# Patient Record
Sex: MALE | Race: WHITE | NOT HISPANIC OR LATINO | URBAN - METROPOLITAN AREA
[De-identification: names, ages, dates, MRNs, and addresses within clinical notes are randomized per-mention and may not be internally consistent; named-entity substitution may affect disease eponyms.]

---

## 2022-06-16 ENCOUNTER — INPATIENT (INPATIENT)
Facility: HOSPITAL | Age: 87
LOS: 0 days | Discharge: ANOTHER HOSPITAL | DRG: 536 | End: 2022-06-17
Attending: HOSPITALIST | Admitting: STUDENT IN AN ORGANIZED HEALTH CARE EDUCATION/TRAINING PROGRAM
Payer: MEDICARE

## 2022-06-16 VITALS
RESPIRATION RATE: 16 BRPM | TEMPERATURE: 97 F | OXYGEN SATURATION: 95 % | SYSTOLIC BLOOD PRESSURE: 124 MMHG | HEART RATE: 73 BPM | DIASTOLIC BLOOD PRESSURE: 65 MMHG

## 2022-06-16 LAB
APTT BLD: 26.1 SEC — LOW (ref 27.5–35.5)
HCT VFR BLD CALC: 33.7 % — LOW (ref 39–50)
HGB BLD-MCNC: 11 G/DL — LOW (ref 13–17)
INR BLD: 1.13 RATIO — SIGNIFICANT CHANGE UP (ref 0.88–1.16)
MCHC RBC-ENTMCNC: 32.6 GM/DL — SIGNIFICANT CHANGE UP (ref 32–36)
MCHC RBC-ENTMCNC: 34 PG — SIGNIFICANT CHANGE UP (ref 27–34)
MCV RBC AUTO: 104 FL — HIGH (ref 80–100)
PLATELET # BLD AUTO: 163 K/UL — SIGNIFICANT CHANGE UP (ref 150–400)
PROTHROM AB SERPL-ACNC: 13.2 SEC — SIGNIFICANT CHANGE UP (ref 10.5–13.4)
RBC # BLD: 3.24 M/UL — LOW (ref 4.2–5.8)
RBC # FLD: 13.7 % — SIGNIFICANT CHANGE UP (ref 10.3–14.5)
WBC # BLD: 16.17 K/UL — HIGH (ref 3.8–10.5)
WBC # FLD AUTO: 16.17 K/UL — HIGH (ref 3.8–10.5)

## 2022-06-16 PROCEDURE — 93010 ELECTROCARDIOGRAM REPORT: CPT

## 2022-06-16 PROCEDURE — 99285 EMERGENCY DEPT VISIT HI MDM: CPT

## 2022-06-16 NOTE — ED PROVIDER NOTE - OBJECTIVE STATEMENT
89 yo male BIBEMS s/p episode of syncope at home, witnessed, no head injury c/o left hip pain after the fall.  Denies any chest pain, sob.

## 2022-06-16 NOTE — ED PROVIDER NOTE - PHYSICAL EXAMINATION
Gen: Alert, NAD  Head/eyes: NC/AT, PERRL  ENT: airway patent  Neck: supple  Pulm/lung: Bilateral clear BS  CV/heart: RRR  GI/Abd: soft, NT/ND, +BS, no guarding/rebound tenderness  Musculoskeletal: no edema/erythema/cyanosis, +ttp left lateral hip, unable to range secondary to pain  Skin: no rash  Neuro: AAOx3, grossly intact

## 2022-06-16 NOTE — ED PROVIDER NOTE - NS ED ROS FT
Constitutional: - Fever, - Chills, - Anorexia, - Fatigue, - Night sweats  Eyes: - Discharge, - Irritation, - Redness, - Visual changes, - Light sensitivity, - Pain  EARS: - Ear Pain, - Tinnitus, - Decreased hearing  NOSE: - Congestion, - Epistaxis  MOUTH/THROAT: - Vocal Changes, - Drooling, - Sore throat  NECK: - Lumps, - Stiffness, - Pain  CV: - Palpitations, - Chest Pain, - Edema, + Syncope  RESP:  - Cough, - Shortness of Breath, - Dyspnea on Exertion, - Trouble speaking, - Pleuritic pain - Wheezing  GI: - Diarrhea, - Constipation, - Bloody stools, - Nausea, - Vomiting, - Abdominal Pain  : - Dysuria, -Frequency, - Hematuria, - Hesitancy, - Incontinence, - Saddle Anesthesia, - Abnormal discharge  MSK: - Myalgias, - Arthralgias, - Weakness, - Deformities, +left hip pain  SKIN: - Color change, - Rash, - Swelling, - Ecchymosis, - Abrasion, - laceration  NEURO: - Change in behavior, - Dec. Alertness, - Headache, - Dizziness, - Change in speech, - Weakness, - Seizure-like activity, - Difficulty ambulating

## 2022-06-16 NOTE — ED ADULT TRIAGE NOTE - CHIEF COMPLAINT QUOTE
per ems from home, with witnessed syncopal episode tonight, no head strike, vomiting with ems. earlier today had mechanical trip and fall, left leg pain per ems from home, with witnessed syncopal episode tonight, no head strike, vomiting with ems, zofran given by ems. earlier today had mechanical trip and fall, left leg pain

## 2022-06-17 ENCOUNTER — INPATIENT (INPATIENT)
Facility: HOSPITAL | Age: 87
LOS: 3 days | Discharge: NURSING, REHAB OR LONG-TERM CARE | DRG: 480 | End: 2022-06-21
Attending: INTERNAL MEDICINE | Admitting: INTERNAL MEDICINE
Payer: MEDICARE

## 2022-06-17 VITALS — RESPIRATION RATE: 16 BRPM | SYSTOLIC BLOOD PRESSURE: 120 MMHG | HEART RATE: 81 BPM | DIASTOLIC BLOOD PRESSURE: 76 MMHG

## 2022-06-17 VITALS
SYSTOLIC BLOOD PRESSURE: 131 MMHG | RESPIRATION RATE: 18 BRPM | DIASTOLIC BLOOD PRESSURE: 69 MMHG | OXYGEN SATURATION: 96 % | HEART RATE: 69 BPM

## 2022-06-17 DIAGNOSIS — S72.002A FRACTURE OF UNSPECIFIED PART OF NECK OF LEFT FEMUR, INITIAL ENCOUNTER FOR CLOSED FRACTURE: ICD-10-CM

## 2022-06-17 DIAGNOSIS — I10 ESSENTIAL (PRIMARY) HYPERTENSION: ICD-10-CM

## 2022-06-17 DIAGNOSIS — Z90.49 ACQUIRED ABSENCE OF OTHER SPECIFIED PARTS OF DIGESTIVE TRACT: Chronic | ICD-10-CM

## 2022-06-17 DIAGNOSIS — D72.829 ELEVATED WHITE BLOOD CELL COUNT, UNSPECIFIED: ICD-10-CM

## 2022-06-17 DIAGNOSIS — S72.142A DISPLACED INTERTROCHANTERIC FRACTURE OF LEFT FEMUR, INITIAL ENCOUNTER FOR CLOSED FRACTURE: ICD-10-CM

## 2022-06-17 DIAGNOSIS — Z95.1 PRESENCE OF AORTOCORONARY BYPASS GRAFT: Chronic | ICD-10-CM

## 2022-06-17 DIAGNOSIS — R55 SYNCOPE AND COLLAPSE: ICD-10-CM

## 2022-06-17 DIAGNOSIS — Z29.9 ENCOUNTER FOR PROPHYLACTIC MEASURES, UNSPECIFIED: ICD-10-CM

## 2022-06-17 DIAGNOSIS — D64.9 ANEMIA, UNSPECIFIED: ICD-10-CM

## 2022-06-17 DIAGNOSIS — N40.0 BENIGN PROSTATIC HYPERPLASIA WITHOUT LOWER URINARY TRACT SYMPTOMS: ICD-10-CM

## 2022-06-17 DIAGNOSIS — I25.10 ATHEROSCLEROTIC HEART DISEASE OF NATIVE CORONARY ARTERY WITHOUT ANGINA PECTORIS: ICD-10-CM

## 2022-06-17 DIAGNOSIS — E78.5 HYPERLIPIDEMIA, UNSPECIFIED: ICD-10-CM

## 2022-06-17 LAB
A1C WITH ESTIMATED AVERAGE GLUCOSE RESULT: 5.7 % — HIGH (ref 4–5.6)
ALBUMIN SERPL ELPH-MCNC: 3.3 G/DL — SIGNIFICANT CHANGE UP (ref 3.3–5)
ALBUMIN SERPL ELPH-MCNC: 3.4 G/DL — SIGNIFICANT CHANGE UP (ref 3.3–5)
ALP SERPL-CCNC: 73 U/L — SIGNIFICANT CHANGE UP (ref 40–120)
ALP SERPL-CCNC: 73 U/L — SIGNIFICANT CHANGE UP (ref 40–120)
ALT FLD-CCNC: 33 U/L — SIGNIFICANT CHANGE UP (ref 12–78)
ALT FLD-CCNC: 34 U/L — SIGNIFICANT CHANGE UP (ref 12–78)
ANION GAP SERPL CALC-SCNC: 5 MMOL/L — SIGNIFICANT CHANGE UP (ref 5–17)
ANION GAP SERPL CALC-SCNC: 9 MMOL/L — SIGNIFICANT CHANGE UP (ref 5–17)
AST SERPL-CCNC: 24 U/L — SIGNIFICANT CHANGE UP (ref 15–37)
AST SERPL-CCNC: 27 U/L — SIGNIFICANT CHANGE UP (ref 15–37)
BASOPHILS # BLD AUTO: 0 K/UL — SIGNIFICANT CHANGE UP (ref 0–0.2)
BASOPHILS # BLD AUTO: 0 K/UL — SIGNIFICANT CHANGE UP (ref 0–0.2)
BASOPHILS # BLD AUTO: 0.02 K/UL — SIGNIFICANT CHANGE UP (ref 0–0.2)
BASOPHILS NFR BLD AUTO: 0 % — SIGNIFICANT CHANGE UP (ref 0–2)
BASOPHILS NFR BLD AUTO: 0 % — SIGNIFICANT CHANGE UP (ref 0–2)
BASOPHILS NFR BLD AUTO: 0.1 % — SIGNIFICANT CHANGE UP (ref 0–2)
BILIRUB SERPL-MCNC: 0.6 MG/DL — SIGNIFICANT CHANGE UP (ref 0.2–1.2)
BILIRUB SERPL-MCNC: 0.8 MG/DL — SIGNIFICANT CHANGE UP (ref 0.2–1.2)
BUN SERPL-MCNC: 15 MG/DL — SIGNIFICANT CHANGE UP (ref 7–23)
BUN SERPL-MCNC: 16 MG/DL — SIGNIFICANT CHANGE UP (ref 7–23)
CALCIUM SERPL-MCNC: 8.5 MG/DL — SIGNIFICANT CHANGE UP (ref 8.5–10.1)
CALCIUM SERPL-MCNC: 8.7 MG/DL — SIGNIFICANT CHANGE UP (ref 8.5–10.1)
CHLORIDE SERPL-SCNC: 108 MMOL/L — SIGNIFICANT CHANGE UP (ref 96–108)
CHLORIDE SERPL-SCNC: 109 MMOL/L — HIGH (ref 96–108)
CHOLEST SERPL-MCNC: 96 MG/DL — SIGNIFICANT CHANGE UP
CK SERPL-CCNC: 120 U/L — SIGNIFICANT CHANGE UP (ref 26–308)
CO2 SERPL-SCNC: 24 MMOL/L — SIGNIFICANT CHANGE UP (ref 22–31)
CO2 SERPL-SCNC: 29 MMOL/L — SIGNIFICANT CHANGE UP (ref 22–31)
CREAT SERPL-MCNC: 0.86 MG/DL — SIGNIFICANT CHANGE UP (ref 0.5–1.3)
CREAT SERPL-MCNC: 0.86 MG/DL — SIGNIFICANT CHANGE UP (ref 0.5–1.3)
EGFR: 83 ML/MIN/1.73M2 — SIGNIFICANT CHANGE UP
EGFR: 83 ML/MIN/1.73M2 — SIGNIFICANT CHANGE UP
ELLIPTOCYTES BLD QL SMEAR: SLIGHT — SIGNIFICANT CHANGE UP
EOSINOPHIL # BLD AUTO: 0 K/UL — SIGNIFICANT CHANGE UP (ref 0–0.5)
EOSINOPHIL NFR BLD AUTO: 0 % — SIGNIFICANT CHANGE UP (ref 0–6)
ESTIMATED AVERAGE GLUCOSE: 117 MG/DL — HIGH (ref 68–114)
FERRITIN SERPL-MCNC: 373 NG/ML — SIGNIFICANT CHANGE UP (ref 30–400)
FOLATE SERPL-MCNC: 9.7 NG/ML — SIGNIFICANT CHANGE UP
GLUCOSE SERPL-MCNC: 142 MG/DL — HIGH (ref 70–99)
GLUCOSE SERPL-MCNC: 143 MG/DL — HIGH (ref 70–99)
HCT VFR BLD CALC: 31.2 % — LOW (ref 39–50)
HCT VFR BLD CALC: 31.7 % — LOW (ref 39–50)
HCT VFR BLD CALC: 34.1 % — LOW (ref 39–50)
HDLC SERPL-MCNC: 46 MG/DL — SIGNIFICANT CHANGE UP
HGB BLD-MCNC: 10.2 G/DL — LOW (ref 13–17)
HGB BLD-MCNC: 10.3 G/DL — LOW (ref 13–17)
HGB BLD-MCNC: 11.1 G/DL — LOW (ref 13–17)
IMM GRANULOCYTES NFR BLD AUTO: 0.4 % — SIGNIFICANT CHANGE UP (ref 0–1.5)
INR BLD: 1.19 RATIO — HIGH (ref 0.88–1.16)
IRON SATN MFR SERPL: 11 % — LOW (ref 16–55)
IRON SATN MFR SERPL: 26 UG/DL — LOW (ref 45–165)
LIDOCAIN IGE QN: 179 U/L — SIGNIFICANT CHANGE UP (ref 73–393)
LIPID PNL WITH DIRECT LDL SERPL: 40 MG/DL — SIGNIFICANT CHANGE UP
LYMPHOCYTES # BLD AUTO: 0.46 K/UL — LOW (ref 1–3.3)
LYMPHOCYTES # BLD AUTO: 0.55 K/UL — LOW (ref 1–3.3)
LYMPHOCYTES # BLD AUTO: 0.81 K/UL — LOW (ref 1–3.3)
LYMPHOCYTES # BLD AUTO: 2.6 % — LOW (ref 13–44)
LYMPHOCYTES # BLD AUTO: 3 % — LOW (ref 13–44)
LYMPHOCYTES # BLD AUTO: 5 % — LOW (ref 13–44)
MANUAL SMEAR VERIFICATION: SIGNIFICANT CHANGE UP
MCHC RBC-ENTMCNC: 32.6 GM/DL — SIGNIFICANT CHANGE UP (ref 32–36)
MCHC RBC-ENTMCNC: 32.7 GM/DL — SIGNIFICANT CHANGE UP (ref 32–36)
MCHC RBC-ENTMCNC: 33.9 PG — SIGNIFICANT CHANGE UP (ref 27–34)
MCHC RBC-ENTMCNC: 33.9 PG — SIGNIFICANT CHANGE UP (ref 27–34)
MCV RBC AUTO: 103.7 FL — HIGH (ref 80–100)
MCV RBC AUTO: 104.3 FL — HIGH (ref 80–100)
MONOCYTES # BLD AUTO: 1.46 K/UL — HIGH (ref 0–0.9)
MONOCYTES # BLD AUTO: 1.7 K/UL — HIGH (ref 0–0.9)
MONOCYTES # BLD AUTO: 1.83 K/UL — HIGH (ref 0–0.9)
MONOCYTES NFR BLD AUTO: 10 % — SIGNIFICANT CHANGE UP (ref 2–14)
MONOCYTES NFR BLD AUTO: 9 % — SIGNIFICANT CHANGE UP (ref 2–14)
MONOCYTES NFR BLD AUTO: 9.5 % — SIGNIFICANT CHANGE UP (ref 2–14)
NEUTROPHILS # BLD AUTO: 13.91 K/UL — HIGH (ref 1.8–7.4)
NEUTROPHILS # BLD AUTO: 15.71 K/UL — HIGH (ref 1.8–7.4)
NEUTROPHILS # BLD AUTO: 15.9 K/UL — HIGH (ref 1.8–7.4)
NEUTROPHILS NFR BLD AUTO: 84 % — HIGH (ref 43–77)
NEUTROPHILS NFR BLD AUTO: 86 % — HIGH (ref 43–77)
NEUTROPHILS NFR BLD AUTO: 87.4 % — HIGH (ref 43–77)
NEUTS BAND # BLD: 3 % — SIGNIFICANT CHANGE UP (ref 0–8)
NON HDL CHOLESTEROL: 50 MG/DL — SIGNIFICANT CHANGE UP
NRBC # BLD: 0 /100 WBCS — SIGNIFICANT CHANGE UP (ref 0–0)
NRBC # BLD: 0 — SIGNIFICANT CHANGE UP
NRBC # BLD: SIGNIFICANT CHANGE UP /100 WBCS (ref 0–0)
NRBC # BLD: SIGNIFICANT CHANGE UP /100 WBCS (ref 0–0)
OB PNL STL: POSITIVE
OVALOCYTES BLD QL SMEAR: SLIGHT — SIGNIFICANT CHANGE UP
PLAT MORPH BLD: NORMAL — SIGNIFICANT CHANGE UP
PLATELET # BLD AUTO: 139 K/UL — LOW (ref 150–400)
PLATELET # BLD AUTO: 141 K/UL — LOW (ref 150–400)
POIKILOCYTOSIS BLD QL AUTO: SLIGHT — SIGNIFICANT CHANGE UP
POTASSIUM SERPL-MCNC: 3.7 MMOL/L — SIGNIFICANT CHANGE UP (ref 3.5–5.3)
POTASSIUM SERPL-MCNC: 4.7 MMOL/L — SIGNIFICANT CHANGE UP (ref 3.5–5.3)
POTASSIUM SERPL-SCNC: 3.7 MMOL/L — SIGNIFICANT CHANGE UP (ref 3.5–5.3)
POTASSIUM SERPL-SCNC: 4.7 MMOL/L — SIGNIFICANT CHANGE UP (ref 3.5–5.3)
PROT SERPL-MCNC: 6.5 G/DL — SIGNIFICANT CHANGE UP (ref 6–8.3)
PROT SERPL-MCNC: 6.6 G/DL — SIGNIFICANT CHANGE UP (ref 6–8.3)
PROTHROM AB SERPL-ACNC: 13.7 SEC — HIGH (ref 10.5–13.4)
RBC # BLD: 3.01 M/UL — LOW (ref 4.2–5.8)
RBC # BLD: 3.27 M/UL — LOW (ref 4.2–5.8)
RBC # FLD: 13.6 % — SIGNIFICANT CHANGE UP (ref 10.3–14.5)
RBC # FLD: 13.7 % — SIGNIFICANT CHANGE UP (ref 10.3–14.5)
RBC BLD AUTO: SIGNIFICANT CHANGE UP
SARS-COV-2 RNA SPEC QL NAA+PROBE: SIGNIFICANT CHANGE UP
SODIUM SERPL-SCNC: 142 MMOL/L — SIGNIFICANT CHANGE UP (ref 135–145)
SODIUM SERPL-SCNC: 142 MMOL/L — SIGNIFICANT CHANGE UP (ref 135–145)
TIBC SERPL-MCNC: 225 UG/DL — SIGNIFICANT CHANGE UP (ref 220–430)
TRANSFERRIN SERPL-MCNC: 192 MG/DL — LOW (ref 200–360)
TRIGL SERPL-MCNC: 49 MG/DL — SIGNIFICANT CHANGE UP
TROPONIN I, HIGH SENSITIVITY RESULT: 11.6 NG/L — SIGNIFICANT CHANGE UP
TSH SERPL-MCNC: 0.97 UIU/ML — SIGNIFICANT CHANGE UP (ref 0.36–3.74)
UIBC SERPL-MCNC: 199 UG/DL — SIGNIFICANT CHANGE UP (ref 110–370)
VIT B12 SERPL-MCNC: 366 PG/ML — SIGNIFICANT CHANGE UP (ref 232–1245)
WBC # BLD: 17.97 K/UL — HIGH (ref 3.8–10.5)
WBC # BLD: 18.28 K/UL — HIGH (ref 3.8–10.5)
WBC # FLD AUTO: 17.97 K/UL — HIGH (ref 3.8–10.5)
WBC # FLD AUTO: 18.28 K/UL — HIGH (ref 3.8–10.5)

## 2022-06-17 PROCEDURE — 73502 X-RAY EXAM HIP UNI 2-3 VIEWS: CPT | Mod: 26,LT

## 2022-06-17 PROCEDURE — 76376 3D RENDER W/INTRP POSTPROCES: CPT | Mod: 26

## 2022-06-17 PROCEDURE — 99223 1ST HOSP IP/OBS HIGH 75: CPT | Mod: GC

## 2022-06-17 PROCEDURE — 71045 X-RAY EXAM CHEST 1 VIEW: CPT | Mod: 26,76

## 2022-06-17 PROCEDURE — 73700 CT LOWER EXTREMITY W/O DYE: CPT | Mod: 26,LT

## 2022-06-17 PROCEDURE — 73552 X-RAY EXAM OF FEMUR 2/>: CPT | Mod: 26,LT

## 2022-06-17 PROCEDURE — 99223 1ST HOSP IP/OBS HIGH 75: CPT

## 2022-06-17 PROCEDURE — 71045 X-RAY EXAM CHEST 1 VIEW: CPT | Mod: 26

## 2022-06-17 RX ORDER — ATORVASTATIN CALCIUM 80 MG/1
20 TABLET, FILM COATED ORAL AT BEDTIME
Refills: 0 | Status: DISCONTINUED | OUTPATIENT
Start: 2022-06-17 | End: 2022-06-21

## 2022-06-17 RX ORDER — SODIUM CHLORIDE 9 MG/ML
1000 INJECTION, SOLUTION INTRAVENOUS
Refills: 0 | Status: DISCONTINUED | OUTPATIENT
Start: 2022-06-17 | End: 2022-06-18

## 2022-06-17 RX ORDER — ONDANSETRON 8 MG/1
4 TABLET, FILM COATED ORAL ONCE
Refills: 0 | Status: COMPLETED | OUTPATIENT
Start: 2022-06-17 | End: 2022-06-17

## 2022-06-17 RX ORDER — LOSARTAN POTASSIUM 100 MG/1
50 TABLET, FILM COATED ORAL DAILY
Refills: 0 | Status: DISCONTINUED | OUTPATIENT
Start: 2022-06-17 | End: 2022-06-17

## 2022-06-17 RX ORDER — ASPIRIN/CALCIUM CARB/MAGNESIUM 324 MG
81 TABLET ORAL DAILY
Refills: 0 | Status: DISCONTINUED | OUTPATIENT
Start: 2022-06-17 | End: 2022-06-17

## 2022-06-17 RX ORDER — ATORVASTATIN CALCIUM 80 MG/1
20 TABLET, FILM COATED ORAL AT BEDTIME
Refills: 0 | Status: DISCONTINUED | OUTPATIENT
Start: 2022-06-17 | End: 2022-06-17

## 2022-06-17 RX ORDER — OXYCODONE HYDROCHLORIDE 5 MG/1
2.5 TABLET ORAL EVERY 4 HOURS
Refills: 0 | Status: DISCONTINUED | OUTPATIENT
Start: 2022-06-17 | End: 2022-06-17

## 2022-06-17 RX ORDER — OXYCODONE HYDROCHLORIDE 5 MG/1
5 TABLET ORAL EVERY 4 HOURS
Refills: 0 | Status: DISCONTINUED | OUTPATIENT
Start: 2022-06-17 | End: 2022-06-17

## 2022-06-17 RX ORDER — SUCRALFATE 1 G
1 TABLET ORAL
Refills: 0 | Status: DISCONTINUED | OUTPATIENT
Start: 2022-06-17 | End: 2022-06-17

## 2022-06-17 RX ORDER — MORPHINE SULFATE 50 MG/1
2 CAPSULE, EXTENDED RELEASE ORAL
Qty: 0 | Refills: 0 | DISCHARGE
Start: 2022-06-17

## 2022-06-17 RX ORDER — LANOLIN ALCOHOL/MO/W.PET/CERES
3 CREAM (GRAM) TOPICAL AT BEDTIME
Refills: 0 | Status: DISCONTINUED | OUTPATIENT
Start: 2022-06-17 | End: 2022-06-17

## 2022-06-17 RX ORDER — ENOXAPARIN SODIUM 100 MG/ML
40 INJECTION SUBCUTANEOUS EVERY 24 HOURS
Refills: 0 | Status: DISCONTINUED | OUTPATIENT
Start: 2022-06-17 | End: 2022-06-17

## 2022-06-17 RX ORDER — MORPHINE SULFATE 50 MG/1
2 CAPSULE, EXTENDED RELEASE ORAL EVERY 6 HOURS
Refills: 0 | Status: DISCONTINUED | OUTPATIENT
Start: 2022-06-17 | End: 2022-06-21

## 2022-06-17 RX ORDER — SENNA PLUS 8.6 MG/1
2 TABLET ORAL AT BEDTIME
Refills: 0 | Status: DISCONTINUED | OUTPATIENT
Start: 2022-06-17 | End: 2022-06-17

## 2022-06-17 RX ORDER — SENNA PLUS 8.6 MG/1
2 TABLET ORAL
Qty: 0 | Refills: 0 | DISCHARGE
Start: 2022-06-17

## 2022-06-17 RX ORDER — LANOLIN ALCOHOL/MO/W.PET/CERES
3 CREAM (GRAM) TOPICAL AT BEDTIME
Refills: 0 | Status: DISCONTINUED | OUTPATIENT
Start: 2022-06-17 | End: 2022-06-21

## 2022-06-17 RX ORDER — SENNA PLUS 8.6 MG/1
2 TABLET ORAL AT BEDTIME
Refills: 0 | Status: DISCONTINUED | OUTPATIENT
Start: 2022-06-17 | End: 2022-06-21

## 2022-06-17 RX ORDER — HEPARIN SODIUM 5000 [USP'U]/ML
5000 INJECTION INTRAVENOUS; SUBCUTANEOUS ONCE
Refills: 0 | Status: COMPLETED | OUTPATIENT
Start: 2022-06-17 | End: 2022-06-17

## 2022-06-17 RX ORDER — ACETAMINOPHEN 500 MG
650 TABLET ORAL EVERY 6 HOURS
Refills: 0 | Status: DISCONTINUED | OUTPATIENT
Start: 2022-06-17 | End: 2022-06-17

## 2022-06-17 RX ORDER — PANTOPRAZOLE SODIUM 20 MG/1
40 TABLET, DELAYED RELEASE ORAL EVERY 12 HOURS
Refills: 0 | Status: DISCONTINUED | OUTPATIENT
Start: 2022-06-17 | End: 2022-06-21

## 2022-06-17 RX ORDER — POLYETHYLENE GLYCOL 3350 17 G/17G
17 POWDER, FOR SOLUTION ORAL DAILY
Refills: 0 | Status: DISCONTINUED | OUTPATIENT
Start: 2022-06-17 | End: 2022-06-17

## 2022-06-17 RX ORDER — ONDANSETRON 8 MG/1
4 TABLET, FILM COATED ORAL EVERY 6 HOURS
Refills: 0 | Status: DISCONTINUED | OUTPATIENT
Start: 2022-06-17 | End: 2022-06-21

## 2022-06-17 RX ORDER — HYDROMORPHONE HYDROCHLORIDE 2 MG/ML
0.5 INJECTION INTRAMUSCULAR; INTRAVENOUS; SUBCUTANEOUS EVERY 4 HOURS
Refills: 0 | Status: DISCONTINUED | OUTPATIENT
Start: 2022-06-17 | End: 2022-06-17

## 2022-06-17 RX ORDER — TAMSULOSIN HYDROCHLORIDE 0.4 MG/1
0.4 CAPSULE ORAL AT BEDTIME
Refills: 0 | Status: DISCONTINUED | OUTPATIENT
Start: 2022-06-17 | End: 2022-06-21

## 2022-06-17 RX ORDER — POLYETHYLENE GLYCOL 3350 17 G/17G
17 POWDER, FOR SOLUTION ORAL DAILY
Refills: 0 | Status: DISCONTINUED | OUTPATIENT
Start: 2022-06-17 | End: 2022-06-21

## 2022-06-17 RX ORDER — MORPHINE SULFATE 50 MG/1
2 CAPSULE, EXTENDED RELEASE ORAL EVERY 4 HOURS
Refills: 0 | Status: DISCONTINUED | OUTPATIENT
Start: 2022-06-17 | End: 2022-06-17

## 2022-06-17 RX ORDER — METOPROLOL TARTRATE 50 MG
25 TABLET ORAL DAILY
Refills: 0 | Status: DISCONTINUED | OUTPATIENT
Start: 2022-06-17 | End: 2022-06-17

## 2022-06-17 RX ORDER — TAMSULOSIN HYDROCHLORIDE 0.4 MG/1
0.4 CAPSULE ORAL AT BEDTIME
Refills: 0 | Status: DISCONTINUED | OUTPATIENT
Start: 2022-06-17 | End: 2022-06-17

## 2022-06-17 RX ORDER — POLYETHYLENE GLYCOL 3350 17 G/17G
17 POWDER, FOR SOLUTION ORAL
Qty: 0 | Refills: 0 | DISCHARGE
Start: 2022-06-17

## 2022-06-17 RX ORDER — MORPHINE SULFATE 50 MG/1
4 CAPSULE, EXTENDED RELEASE ORAL ONCE
Refills: 0 | Status: DISCONTINUED | OUTPATIENT
Start: 2022-06-17 | End: 2022-06-17

## 2022-06-17 RX ORDER — SUCRALFATE 1 G
1 TABLET ORAL EVERY 6 HOURS
Refills: 0 | Status: DISCONTINUED | OUTPATIENT
Start: 2022-06-17 | End: 2022-06-21

## 2022-06-17 RX ORDER — HYDROMORPHONE HYDROCHLORIDE 2 MG/ML
0.5 INJECTION INTRAMUSCULAR; INTRAVENOUS; SUBCUTANEOUS
Qty: 0 | Refills: 0 | DISCHARGE
Start: 2022-06-17

## 2022-06-17 RX ORDER — ACETAMINOPHEN 500 MG
2 TABLET ORAL
Qty: 0 | Refills: 0 | DISCHARGE
Start: 2022-06-17

## 2022-06-17 RX ORDER — SODIUM CHLORIDE 9 MG/ML
1000 INJECTION INTRAMUSCULAR; INTRAVENOUS; SUBCUTANEOUS
Refills: 0 | Status: DISCONTINUED | OUTPATIENT
Start: 2022-06-17 | End: 2022-06-17

## 2022-06-17 RX ORDER — HYDROCHLOROTHIAZIDE 25 MG
12.5 TABLET ORAL
Refills: 0 | Status: DISCONTINUED | OUTPATIENT
Start: 2022-06-17 | End: 2022-06-17

## 2022-06-17 RX ORDER — PANTOPRAZOLE SODIUM 20 MG/1
8 TABLET, DELAYED RELEASE ORAL
Qty: 80 | Refills: 0 | Status: DISCONTINUED | OUTPATIENT
Start: 2022-06-17 | End: 2022-06-17

## 2022-06-17 RX ORDER — HYDROMORPHONE HYDROCHLORIDE 2 MG/ML
0.5 INJECTION INTRAMUSCULAR; INTRAVENOUS; SUBCUTANEOUS EVERY 6 HOURS
Refills: 0 | Status: DISCONTINUED | OUTPATIENT
Start: 2022-06-17 | End: 2022-06-21

## 2022-06-17 RX ORDER — ACETAMINOPHEN 500 MG
650 TABLET ORAL EVERY 6 HOURS
Refills: 0 | Status: DISCONTINUED | OUTPATIENT
Start: 2022-06-17 | End: 2022-06-19

## 2022-06-17 RX ADMIN — MORPHINE SULFATE 2 MILLIGRAM(S): 50 CAPSULE, EXTENDED RELEASE ORAL at 20:20

## 2022-06-17 RX ADMIN — SODIUM CHLORIDE 75 MILLILITER(S): 9 INJECTION INTRAMUSCULAR; INTRAVENOUS; SUBCUTANEOUS at 07:30

## 2022-06-17 RX ADMIN — HYDROMORPHONE HYDROCHLORIDE 0.5 MILLIGRAM(S): 2 INJECTION INTRAMUSCULAR; INTRAVENOUS; SUBCUTANEOUS at 11:46

## 2022-06-17 RX ADMIN — Medication 3 MILLIGRAM(S): at 21:29

## 2022-06-17 RX ADMIN — ATORVASTATIN CALCIUM 20 MILLIGRAM(S): 80 TABLET, FILM COATED ORAL at 21:30

## 2022-06-17 RX ADMIN — SODIUM CHLORIDE 60 MILLILITER(S): 9 INJECTION, SOLUTION INTRAVENOUS at 21:30

## 2022-06-17 RX ADMIN — HEPARIN SODIUM 5000 UNIT(S): 5000 INJECTION INTRAVENOUS; SUBCUTANEOUS at 17:26

## 2022-06-17 RX ADMIN — PANTOPRAZOLE SODIUM 10 MG/HR: 20 TABLET, DELAYED RELEASE ORAL at 15:58

## 2022-06-17 RX ADMIN — Medication 1 GRAM(S): at 23:18

## 2022-06-17 RX ADMIN — MORPHINE SULFATE 4 MILLIGRAM(S): 50 CAPSULE, EXTENDED RELEASE ORAL at 01:03

## 2022-06-17 RX ADMIN — HYDROMORPHONE HYDROCHLORIDE 0.5 MILLIGRAM(S): 2 INJECTION INTRAMUSCULAR; INTRAVENOUS; SUBCUTANEOUS at 06:41

## 2022-06-17 RX ADMIN — ONDANSETRON 4 MILLIGRAM(S): 8 TABLET, FILM COATED ORAL at 00:33

## 2022-06-17 RX ADMIN — Medication 25 MILLIGRAM(S): at 06:07

## 2022-06-17 RX ADMIN — LOSARTAN POTASSIUM 50 MILLIGRAM(S): 100 TABLET, FILM COATED ORAL at 06:07

## 2022-06-17 RX ADMIN — MORPHINE SULFATE 4 MILLIGRAM(S): 50 CAPSULE, EXTENDED RELEASE ORAL at 00:33

## 2022-06-17 RX ADMIN — Medication 1 GRAM(S): at 17:27

## 2022-06-17 RX ADMIN — Medication 81 MILLIGRAM(S): at 06:07

## 2022-06-17 RX ADMIN — PANTOPRAZOLE SODIUM 40 MILLIGRAM(S): 20 TABLET, DELAYED RELEASE ORAL at 17:26

## 2022-06-17 RX ADMIN — MORPHINE SULFATE 2 MILLIGRAM(S): 50 CAPSULE, EXTENDED RELEASE ORAL at 19:56

## 2022-06-17 RX ADMIN — TAMSULOSIN HYDROCHLORIDE 0.4 MILLIGRAM(S): 0.4 CAPSULE ORAL at 21:30

## 2022-06-17 RX ADMIN — ONDANSETRON 4 MILLIGRAM(S): 8 TABLET, FILM COATED ORAL at 07:26

## 2022-06-17 RX ADMIN — SENNA PLUS 2 TABLET(S): 8.6 TABLET ORAL at 21:29

## 2022-06-17 RX ADMIN — HYDROMORPHONE HYDROCHLORIDE 0.5 MILLIGRAM(S): 2 INJECTION INTRAMUSCULAR; INTRAVENOUS; SUBCUTANEOUS at 04:48

## 2022-06-17 RX ADMIN — Medication 12.5 MILLIGRAM(S): at 06:07

## 2022-06-17 NOTE — H&P ADULT - PROBLEM SELECTOR PLAN 2
Patient presents with -- likely 2/2 vasogal syncope vs. bradycardia   - Check orthostatic vitals when able given fracture of L hip  - Check TSH, A1c, lipid profile, monitor electrolytes  - Telemetry monitoring to r/o arrhythmia  - Fall and aspiration precautions  - F/u TTE and carotid dopplers  - PT/ OT evaluation  - Neurology (Dr. Harris) consulted, f/u recs  - Cardiology ([facility]) consulted, f/u recs Patient presents with -- likely 2/2 vasogal syncope vs. bradycardia   - Check orthostatic vitals when able given fracture of L hip  - Check TSH, A1c, lipid profile, monitor electrolytes  - Telemetry monitoring to r/o arrhythmia  - Fall and aspiration precautions  - F/u TTE and carotid dopplers  - F/u CK level   - PT/ OT evaluation  - Neurology (Dr. aHrris) consulted, f/u recs  - Cardiology ([facility]) consulted, f/u recs Patient reportedly passed out in his recliner after the fall while in extreme pain as per wife   - regained consciousness after ~1 minute   - patient has no residual deficits   - f/o orthostatics and TSH  - neurological cause of syncope unclear; will hold off on neurology consult for now   - Cardiology Dr. Petersen consulted, f/u recs Patient reportedly passed out in his recliner after the fall while in extreme pain as per wife   - likely vagal mediated syncope   - regained consciousness after ~1 minute   - patient has no residual deficits   - f/o orthostatics and TSH  - Cardiology Dr. Petersen consulted, f/u recs

## 2022-06-17 NOTE — H&P ADULT - ASSESSMENT
88 year old male with PMHx CAD s/p CABG x4, HTN, HLD, BPH, presented to the ED after fall at home yesterday afternoon admitted for left hip fracture with planned OR with orthopedic surgery.     Fracture of left hip.   Patient presents s/p unwitnessed fall at home  - Xray L femur and hip: suspicious for fracture  - continue pain medication prn   - Fall precautions  - PT eval  - RCRI Class II risk for low-intermediate risk surgery  - Cardio input appreciated. Patient medically optimized at intermediate risk for urgent orthopedic procedure with routine hemodynamic monitoring     Syncope.   Patient reportedly passed out in his recliner after the fall while in extreme pain as per wife   - likely vagal mediated syncope   - regained consciousness after ~1 minute   - patient has no residual deficits   - TSH WNL- Neurology consulted Dr. Nevarez     Leukocytosis.   WBC 16.17 on admission likely reactive  - Afebrile, appears non toxic  - Monitor off antibiotics at this time  - F/u AM CBC.  - Surviallance blood cultures ordered     Anemia.   H/H 11.0/33.7 on admission, baseline hemoglobin unknown  - Currently hemodynamically stable, monitor for signs and symptoms of active bleeding  - Maintain active type and screen  - F/u iron panel: iron, ferritin, TIBC, transferrin  - F/u folate, vitamin B12  - F/u AM CBC.    CAD (coronary artery disease).   s/p CABG x4 2003  - continue home aspirin and statin  - stable, no signs or sx of ACS  - cardiology following    Hypertension.   - Metoprolol POD 1, Losartan POD 2   - Monitor routine hemodynamics.    Hyperlipidemia.    Continue home Atorvastatin 20 mg PO qhs and ASA 81 mg PO qd.    BPH (benign prostatic hyperplasia).   Continue home Tamsulosin 0.4 mg PO qd.    Need for prophylactic measure.   SCD's given possible OR today    IMPROVE VTE Individual Risk Assessment          RISK                                                          Points  [  ] Previous VTE                                                3  [  ] Thrombophilia                                             2  [  ] Lower limb paralysis                                   2        (unable to hold up >15 seconds)    [  ] Current Cancer                                             2         (within 6 months)  [  ] Immobilization > 24 hrs                              1  [  ] ICU/CCU stay > 24 hours                             1  [ x ] Age > 60                                                         1    IMPROVE VTE Score: 1.   88 year old male with PMHx CAD s/p CABG x4, HTN, HLD, BPH, presented to the ED after fall at home yesterday afternoon admitted for left hip fracture with planned OR with orthopedic surgery.     Fracture of left hip.   Patient presents s/p unwitnessed fall at home  - Xray L femur and hip: suspicious for fracture  - continue pain medication prn   - Fall precautions  - PT eval post op  - Cardio input appreciated.   - Patient not currently medically optimized for procedure pending GI workup    Emesis   -Patient was nauseous en route from [facility] and noted to be coughing up coffee ground emesis  -Check STAT H/H and Q6, called and sent message to GI on call  - PPI, Carafate    Syncope.   Patient reportedly passed out in his recliner after the fall while in extreme pain as per wife   - likely vagal mediated syncope   - regained consciousness after ~1 minute   - patient has no residual deficits   - TSH WNL- Neurology consulted Dr. Nevarez     Leukocytosis.   WBC 16.17 on admission likely reactive  - Afebrile, appears non toxic  - Monitor off antibiotics at this time  - F/u AM CBC.  - Surveillance blood cultures ordered     Anemia.   H/H 11.0/33.7 on admission, baseline hemoglobin unknown  - Currently hemodynamically stable, monitor for signs and symptoms of active bleeding  - Maintain active type and screen  - F/u iron panel: iron, ferritin, TIBC, transferrin  - F/u folate, vitamin B12  - F/u AM CBC.    CAD (coronary artery disease).   s/p CABG x4 2003  - Hold Aspirin  - continue and statin  - stable, no signs or sx of ACS  - cardiology following    Hypertension.   - Metoprolol, Losartan   - Monitor routine hemodynamics.    Hyperlipidemia.    Continue home Atorvastatin 20 mg PO qhs and ASA 81 mg PO qd.    BPH (benign prostatic hyperplasia).   Continue home Tamsulosin 0.4 mg PO qd.    Need for prophylactic measure.   SCD's for now    IMPROVE VTE Individual Risk Assessment          RISK                                                          Points  [  ] Previous VTE                                                3  [  ] Thrombophilia                                             2  [  ] Lower limb paralysis                                   2        (unable to hold up >15 seconds)    [  ] Current Cancer                                             2         (within 6 months)  [  ] Immobilization > 24 hrs                              1  [  ] ICU/CCU stay > 24 hours                             1  [ x ] Age > 60                                                         1    IMPROVE VTE Score: 1.

## 2022-06-17 NOTE — CONSULT NOTE ADULT - ASSESSMENT
Pt is seen for Fall  -was mechanical fall.   Didn't hit head.   After the fall pt had Brief LOC  No seizure activity is reported.  Non focal exam except left LE   -Has Left Hip Fx.  No signs of CVA.  Clinically volume depleted.   Likely had Vasovagal syncope.  Fluids.  Pain meds.  Ortho follows  - Plan for left Hip Sx   -Neurologically pt is cleared for Sx.  D/w Pt/wife/son at bedside. Questions answered.  D/w Dr. Geiger.  Would continue to follow.

## 2022-06-17 NOTE — H&P ADULT - NSHPSOCIALHISTORY_GEN_ALL_CORE
Tobacco:   EtOH:   Recreational drug use:  Lives with:  Ambulates:  ADLs:  Occupation:  Vaccinations:  Mammogram/Pap Smear/Colonoscopy: Tobacco: former smoker 1ppd age 16 to mid 30s   EtOH: socially  Recreational drug use: denies  Lives with: wife and daughter  Ambulates: with cane as of recently given right shoulder injury ~2 months ago  ADLs: with assistance   Vaccinations: Moderna with booster

## 2022-06-17 NOTE — CONSULT NOTE ADULT - SUBJECTIVE AND OBJECTIVE BOX
Patient is a 88y old  Male who presents with a chief complaint of ? esophgagitis (17 Jun 2022 16:25)    HPI: 88 year old male with PMHx CAD s/p CABG x4, HTN, HLD, BPH, presents to the ED after fall at home yesterday afternoon. Patient was found on the floor in the kitchen, unwitnessed fall, and had landed on his left side. After the fall family helped him up an moved him to the recliner where he laid down in extreme pain. He then passed out from the pain as per wife for about 1 minute. Patient regained consciousness and was brought to the ED. Patient denies memory of passing out. He also denies fever, chills, cp, sob, abd pain, n/v/d. Patient seen by cardiology and transferred to [facility] for further orthopedic workup and management. Per patient, he was nauseous en route from [facility] to [facility] and was coughing up dark Emesis. He has no hx of GI bleed and denies any hx of same. Patient has been on ASA long term for hx of CAD     (17 Jun 2022 13:30)      DRAFT    PAST MEDICAL & SURGICAL HISTORY:  CAD (coronary artery disease)      HTN (hypertension)      HLD (hyperlipidemia)      S/P CABG x 4      History of cholecystectomy          MEDICATIONS  (STANDING):  atorvastatin 20 milliGRAM(s) Oral at bedtime  heparin   Injectable 5000 Unit(s) SubCutaneous once  lactated ringers. 1000 milliLiter(s) (60 mL/Hr) IV Continuous <Continuous>  pantoprazole    Tablet 40 milliGRAM(s) Oral every 12 hours  senna 2 Tablet(s) Oral at bedtime  sucralfate suspension 1 Gram(s) Oral every 6 hours  tamsulosin 0.4 milliGRAM(s) Oral at bedtime    MEDICATIONS  (PRN):  acetaminophen     Tablet .. 650 milliGRAM(s) Oral every 6 hours PRN Temp greater or equal to 38C (100.4F), Mild Pain (1 - 3)  HYDROmorphone  Injectable 0.5 milliGRAM(s) IV Push every 6 hours PRN Severe Pain (7 - 10)  melatonin 3 milliGRAM(s) Oral at bedtime PRN Insomnia  morphine  - Injectable 2 milliGRAM(s) IV Push every 6 hours PRN Moderate Pain (4 - 6)  ondansetron Injectable 4 milliGRAM(s) IV Push every 6 hours PRN Nausea and/or Vomiting  polyethylene glycol 3350 17 Gram(s) Oral daily PRN Constipation      Allergies    No Known Allergies    Intolerances        SOCIAL HISTORY:    No h/o Smoking.   No h/o alcohol use.  Ex Smoker. Quite in past.  Pt smokes.  Pt does drink socially.  Pt drinks alcohol heavily.    FAMILY HISTORY:  No pertinent family history in first degree relatives        REVIEW OF SYSTEMS:    CONSTITUTIONAL: No fever  EYES: No eye pain,   ENMT:  No sinus or throat pain  NECK: No pain or stiffness  RESPIRATORY: No cough, No hemoptysis; No shortness of breath  CARDIOVASCULAR: No acute chest pain, palpitations,  or leg swelling  GASTROINTESTINAL: No abdominal pain. No nausea, vomiting, or hematemesis;  No melena or hematochezia.  GENITOURINARY: No  hematuria, or incontinence  MUSCULOSKELETAL: No joint swelling; No extremity pain  SKIN: No itching, rashes, or lesions   LYMPH NODES: No enlarged glands  NEUROLOGICAL: No headaches, memory loss,   PSYCHIATRIC: No depression, anxiety, mood swings, or difficulty sleeping  ENDOCRINE: No heat or cold intolerance;   HEME/LYMPH: No easy bruising, or bleeding gums  Allergy/Immunology. No medication allergy. No seasonal allergies.    PHYSICAL EXAM:  Vital Signs Last 24 Hrs  T(F): 97.6 (06-17-22 @ 13:03)  HR: 95 (06-17-22 @ 13:03)  BP: 103/62 (06-17-22 @ 13:03)  RR: 19 (06-17-22 @ 13:03)    GENERAL: NAD, well-groomed, well-developed  HEAD:  Atraumatic, Normocephalic  EYES: EOMI, PERRLA, conjunctiva and sclera clear  NECK: Supple, No JVD, thyroid non-palpable    On Neurological Examination:    Mental Status - Pt is alert, awake, oriented X3. Higher functions are intact. Pt. does have mild poor cognition. Follows commands well and able to answer questions appropriately.    Speech -  Normal. Slurred. Pt has no aphasia.    Cranial Nerves - Pupils 3 mm equal and reactive to light, extraocular eye movements intact. Pt has no visual field deficit.  Pt has no right left facial asymmetry. Tongue - is in midline.    Motor Exam - 4 plus/5 all over, No drift. No shaking or tremors.  Muscle tone - is normal all over. Moves all extremities equally. No asymmetry is seen.      Sensory Exam - Pin prick, temperature, joint position and vibration are intact on either side. Pt withdraws all extremities equally on stimulation. No asymmetry seen. No complaints of tingling, numbness.    Gait - Able to stand and walk unassisted. Pt is able to stand up with holding my hands and is able to walk for few feet around the bed. Not falling to either side.    Deep tendon Reflexes - 2 plus all over.    Coordination - Fine finger movements are normal on both sides. Finger to nose is also normal on both sides.       Romberg - Negative.    Neck Supple -  Yes.    LABS:                        10.2   18.28 )-----------( 141      ( 17 Jun 2022 14:50 )             31.2     06-17    142  |  108  |  16  ----------------------------<  142<H>  4.7   |  29  |  0.86    Ca    8.7      17 Jun 2022 05:34    TPro  6.6  /  Alb  3.4  /  TBili  0.8  /  DBili  x   /  AST  24  /  ALT  34  /  AlkPhos  73  06-17    PT/INR - ( 17 Jun 2022 14:50 )   PT: 13.7 sec;   INR: 1.19 ratio         PTT - ( 16 Jun 2022 23:45 )  PTT:26.1 sec      RADIOLOGY & ADDITIONAL STUDIES:       PCP: Dr. Lisa Santiago  Nephro: Dr. Fink Patient is a 88y old  Male who presents with a chief complaint of ? esophgagitis (17 Jun 2022 16:25)    HPI: 88 year old male with PMHx CAD s/p CABG x4, HTN, HLD, BPH, presents to the ED after fall at home yesterday afternoon. Patient was found on the floor in the kitchen, unwitnessed fall, and had landed on his left side. After the fall family helped him up an moved him to the recliner where he laid down in extreme pain. He then passed out from the pain as per wife for about 1 minute. Patient regained consciousness and was brought to the ED. Patient denies memory of passing out. He also denies fever, chills, cp, sob, abd pain, n/v/d. Patient seen by cardiology and transferred to [facility] for further orthopedic workup and management. Per patient, he was nauseous en route [facility] and was coughing up dark Emesis. He has no hx of GI bleed and denies any hx of same. Patient has been on ASA long term for hx of CAD.  No seizure reported.  Found to have left Hip Fx.  Wife and son are at bedside.     (17 Jun 2022 13:30)    PAST MEDICAL & SURGICAL HISTORY:    CAD (coronary artery disease)    HTN (hypertension)    HLD (hyperlipidemia)    S/P CABG x 4    History of cholecystectomy    MEDICATIONS  (STANDING):    atorvastatin 20 milliGRAM(s) Oral at bedtime  heparin   Injectable 5000 Unit(s) SubCutaneous once  lactated ringers. 1000 milliLiter(s) (60 mL/Hr) IV Continuous <Continuous>  pantoprazole    Tablet 40 milliGRAM(s) Oral every 12 hours  senna 2 Tablet(s) Oral at bedtime  sucralfate suspension 1 Gram(s) Oral every 6 hours  tamsulosin 0.4 milliGRAM(s) Oral at bedtime    MEDICATIONS  (PRN):    acetaminophen     Tablet .. 650 milliGRAM(s) Oral every 6 hours PRN Temp greater or equal to 38C (100.4F), Mild Pain (1 - 3)  HYDROmorphone  Injectable 0.5 milliGRAM(s) IV Push every 6 hours PRN Severe Pain (7 - 10)  melatonin 3 milliGRAM(s) Oral at bedtime PRN Insomnia  morphine  - Injectable 2 milliGRAM(s) IV Push every 6 hours PRN Moderate Pain (4 - 6)  ondansetron Injectable 4 milliGRAM(s) IV Push every 6 hours PRN Nausea and/or Vomiting  polyethylene glycol 3350 17 Gram(s) Oral daily PRN Constipation    Allergies    No Known Allergies    SOCIAL HISTORY:    No h/o Smoking.   No h/o alcohol use.    FAMILY HISTORY:    No pertinent family history in first degree relatives    REVIEW OF SYSTEMS:    CONSTITUTIONAL: No fever  EYES: No eye pain,   ENMT:  No sinus or throat pain  NECK: No pain or stiffness  RESPIRATORY: No cough, No hemoptysis; No shortness of breath  CARDIOVASCULAR: No acute chest pain, palpitations,  or leg swelling  GASTROINTESTINAL: No abdominal pain. No nausea, vomiting, or hematemesis;  GENITOURINARY: No  hematuria, or incontinence  MUSCULOSKELETAL: C/o Left Hip pain  SKIN: No itching, rashes, or lesions   LYMPH NODES: No enlarged glands  NEUROLOGICAL: No headaches, memory loss,   PSYCHIATRIC: No depression, anxiety, mood swings  ENDOCRINE: No heat or cold intolerance;   HEME/LYMPH: No easy bruising, or bleeding gums  Allergy/Immunology. No medication allergy. No seasonal allergies.    PHYSICAL EXAM:  Vital Signs Last 24 Hrs  T(F): 97.6 (06-17-22 @ 13:03)  HR: 95 (06-17-22 @ 13:03)  BP: 103/62 (06-17-22 @ 13:03)  RR: 19 (06-17-22 @ 13:03)    GENERAL: NAD, well-groomed, well-developed  HEAD:  Atraumatic, Normocephalic  EYES: EOMI, PERRLA, conjunctiva and sclera clear  NECK: Supple, No JVD, thyroid non-palpable    On Neurological Examination:    Mental Status - Pt is alert, awake, oriented X3. Higher functions are intact. Follows commands well and able to answer questions appropriately.    Speech -  Normal. Pt has no aphasia.    Cranial Nerves - Pupils 3 mm equal and reactive to light, extraocular eye movements intact. Pt has no visual field deficit.  No facial asymmetry. Tongue - is in midline.    Motor Exam - 4 plus/5 all over, except left LE where he has Left Hip Fx. No shaking or tremors.    Sensory Exam - Pt withdraws all extremities equally on stimulation. No asymmetry seen. No complaints of tingling, numbness.    Gait - Not tested. Has Left Hip Fx.     Deep tendon Reflexes - 2 plus all over.    Coordination - Fine finger movements are normal on both sides. Finger to nose is also normal on both sides.       Neck Supple -  Yes.    LABS:                        10.2   18.28 )-----------( 141      ( 17 Jun 2022 14:50 )             31.2     06-17    142  |  108  |  16  ----------------------------<  142<H>  4.7   |  29  |  0.86    Ca    8.7      17 Jun 2022 05:34    TPro  6.6  /  Alb  3.4  /  TBili  0.8  /  DBili  x   /  AST  24  /  ALT  34  /  AlkPhos  73  06-17    PT/INR - ( 17 Jun 2022 14:50 )   PT: 13.7 sec;   INR: 1.19 ratio      PTT - ( 16 Jun 2022 23:45 )  PTT:26.1 sec    RADIOLOGY & ADDITIONAL STUDIES:    < from: CT Hip No Cont, Left (06.17.22 @ 08:11) >    Comminuted left intertrochanteric fracture.    < end of copied text >

## 2022-06-17 NOTE — CONSULT NOTE ADULT - SUBJECTIVE AND OBJECTIVE BOX
[facility] Cardiology    CHIEF COMPLAINT: Patient is a 88y old  Male who presents with a chief complaint of L hip fracture (17 Jun 2022 09:18)      HPI:  88 year old male with PMHx CAD s/p CABG x4, HTN, HLD, BPH, presents to the ED after fall at home yesterday afternoon. Patient was found on the floor in the kitchen, unwitnessed fall, and had landed on his left side. After the fall family helped him up an moved him to the recliner where he laid down in extreme pain. He then passed out from the pain as per wife for about 1 minute. Patient regained consciousness and was brought to the ED. Patient denies memory of passing out. He also denies fever, chills, cp, sob, abd pain, n/v/d  ED course:  Vitals: T 97.4 HR 73 /65 RR 16 SpO2 95% on RA  Labs: WBC 16.17, H/H 11.0/33.7, .0  CXR: pending official read  Xray L femur and hip: pending official read  Given Morphine 4 mg IVP x1, Zofran 4 mg IVP x1 (17 Jun 2022 03:17)    PAST MEDICAL & SURGICAL HISTORY:  CAD (coronary artery disease)      HTN (hypertension)      HLD (hyperlipidemia)      S/P CABG x 4      History of cholecystectomy        SOCIAL HISTORY: no tobacco  FAMILY HISTORY:  No pertinent family history in first degree relatives     HTN  MEDICATIONS  (STANDING):  aspirin enteric coated 81 milliGRAM(s) Oral daily  atorvastatin 20 milliGRAM(s) Oral at bedtime  hydrochlorothiazide 12.5 milliGRAM(s) Oral <User Schedule>  losartan 50 milliGRAM(s) Oral daily  metoprolol succinate ER 25 milliGRAM(s) Oral daily  senna 2 Tablet(s) Oral at bedtime  sodium chloride 0.9%. 1000 milliLiter(s) (75 mL/Hr) IV Continuous <Continuous>  tamsulosin 0.4 milliGRAM(s) Oral at bedtime    MEDICATIONS  (PRN):  acetaminophen     Tablet .. 650 milliGRAM(s) Oral every 6 hours PRN Mild Pain (1 - 3)  HYDROmorphone  Injectable 0.5 milliGRAM(s) IV Push every 4 hours PRN Severe Pain (7 - 10)  melatonin 3 milliGRAM(s) Oral at bedtime PRN Insomnia  morphine  - Injectable 2 milliGRAM(s) IV Push every 4 hours PRN Moderate Pain (4 - 6)  polyethylene glycol 3350 17 Gram(s) Oral daily PRN Constipation    Allergies    No Known Allergies    Intolerances        REVIEW OF SYSTEMS:  CONSTITUTIONAL: No weakness, no fevers   EYES/ENT: No visual changes  NECK: No pain or stiffness  RESPIRATORY: No shortness of breath  CARDIOVASCULAR: No chest pain or palpitations  GASTROINTESTINAL: No abdominal pain  GENITOURINARY: No hematuria  NEUROLOGICAL: No weakness  SKIN: No rash  All other review of systems is negative unless indicated above    VITAL SIGNS:   Vital Signs Last 24 Hrs  T(C): 36.6 (17 Jun 2022 05:55), Max: 36.6 (17 Jun 2022 05:55)  T(F): 97.8 (17 Jun 2022 05:55), Max: 97.8 (17 Jun 2022 05:55)  HR: 68 (17 Jun 2022 05:55) (68 - 73)  BP: 138/73 (17 Jun 2022 05:55) (124/65 - 138/73)  BP(mean): --  RR: 18 (17 Jun 2022 05:55) (16 - 19)  SpO2: 94% (17 Jun 2022 05:55) (94% - 96%)  I&O's Summary    PHYSICAL EXAM:  Constitutional: NAD  Neurological: Alert and oriented  HEENT: EOMI, no JVD  Cardiovascular: S1 and S2, no murmur  Pulmonary: breath sounds bilaterally  Gastrointestinal: Bowel Sounds present, soft, nontender  Ext: no peripheral edema  Skin: No rashes, No cyanosis.  Psych:  Mood calm    LABS: All Labs Reviewed:                        11.1   17.97 )-----------( 139      ( 17 Jun 2022 05:34 )             34.1     06-17    142  |  108  |  16  ----------------------------<  142<H>  4.7   |  29  |  0.86    Ca    8.7      17 Jun 2022 05:34    TPro  6.6  /  Alb  3.4  /  TBili  0.8  /  DBili  x   /  AST  24  /  ALT  34  /  AlkPhos  73  06-17    PT/INR - ( 16 Jun 2022 23:45 )   PT: 13.2 sec;   INR: 1.13 ratio         PTT - ( 16 Jun 2022 23:45 )  PTT:26.1 sec  CARDIAC MARKERS ( 17 Jun 2022 05:34 )  x     / x     / 120 U/L / x     / x          88 year old male with PMHx CAD s/p CABG x4, HTN, HLD, BPH, presents to the ED after fall at home yesterday afternoon. Patient was found on the floor in the kitchen, unwitnessed fall, and had landed on his left side. After the fall family helped him up an moved him to the recliner where he laid down in extreme pain. He then passed out from the pain as per wife for about 1 minute. Patient regained consciousness and was brought to the ED. Patient denies memory of passing out. He also denies fever, chills, cp, sob, abd pain, n/v/d  ED course:  Vitals: T 97.4 HR 73 /65 RR 16 SpO2 95% on RA  Labs: WBC 16.17, H/H 11.0/33.7, .0  CXR: clear lungs  Xray L femur and hip: fx  Given Morphine 4 mg IVP x1, Zofran 4 mg IVP x1 (17 Jun 2022 03:17)    pt denies CP/SOB/Syncope  He admits to hx of unsteady gait, at least 4 falls over past 1-2 years per pt.  12 lead ekg; NSR old RBBB  trop neg  cardiac catheterization July 20, 2021 demonstrating patent LIMA to the LAD, SVG to RCA, SVG to diagonal and a closed SVG to OM.  EF 45%. No stenting was performed    No cardiac contraindication to O.R.  cont asa/statin/BB/ARB/HCTZ  pt sees me outpt [phone]  will follow here            
Patient is an 88 year old male, s/p mechanical fall at home yesterday, complaining of left hip pain. Patient states he lost his balance in the kitchen falling back onto his left hip. Patient as not able to stand up on his own, was helped to a chair by family. Denies loc relating to the fall but possible syncope after the fall from severe pain. Pain currently is a 5/10 constant, aching, non-radiating pain to the left hip and groin worsened with hip movement and improved with rest and pain medications. Denies neck pain, back pain, headache, chest pain, SOB, numbness, tingling, weakness, fever, chills. Patient stated he recently fell and broke his right shoulder, did not require surgery and healed up well. Admits to using a walker to assist with ambulation when needed.

## 2022-06-17 NOTE — CONSULT NOTE ADULT - SUBJECTIVE AND OBJECTIVE BOX
Chief Complaint:  Patient is a 88y old  Male who presents with a chief complaint of hip fracture called to see pt when he fell and broke his hip now found to have guiaic postive stools s/p fall.  denies any melena no brbpr has been stationary in chair for a month. now he has had a colonosocpy 3 years ago never had an  upper gastrointestinal endoscopy in the past   History of Present Illness:   88 year old male with PMHx CAD s/p CABG x4, HTN, HLD, BPH, presents to the ED after fall at home yesterday afternoon. Patient was found on the floor in the kitchen, unwitnessed fall, and had landed on his left side. After the fall family helped him up an moved him to the recliner where he laid down in extreme pain. He then passed out from the pain as per wife for about 1 minute. Patient regained consciousness and was brought to the ED. Patient denies memory of passing out. He also denies fever, chills, cp, sob, abd pain, n/v/d. Patient seen by cardiology and transferred to [facility] for further orthopedic workup and management. Per patient, he was nauseous en route from [facility] to ED and was coughing up dark Emesis. He has no hx of GI bleed and denies any hx of same. Patient has been on ASA long term for hx of CAD        Review of Systems   Review of Systems: Constitutional: denies fever, chills  HEENT: denies blurry vision  Respiratory: denies SOB, cough, sputum production  Cardiovascular: denies CP, palpitations, edema  Gastrointestinal: Admits nausea, vomiting, no diarrhea, constipation, abdominal pain  Genitourinary: denies dysuria, frequency, urgency, hematuria   Skin/Breast: denies rash, itching  MSK: admits L hip pain  Neurologic: denies headache, weakness, dizziness, numbness/tingling  Psychiatric: denies anxiety, depression    Allergies:  No Known Allergies      Medications:  acetaminophen     Tablet .. 650 milliGRAM(s) Oral every 6 hours PRN  atorvastatin 20 milliGRAM(s) Oral at bedtime  HYDROmorphone  Injectable 0.5 milliGRAM(s) IV Push every 6 hours PRN  melatonin 3 milliGRAM(s) Oral at bedtime PRN  morphine  - Injectable 2 milliGRAM(s) IV Push every 6 hours PRN  ondansetron Injectable 4 milliGRAM(s) IV Push every 6 hours PRN  pantoprazole    Tablet 40 milliGRAM(s) Oral every 12 hours  polyethylene glycol 3350 17 Gram(s) Oral daily PRN  senna 2 Tablet(s) Oral at bedtime  sucralfate suspension 1 Gram(s) Oral every 6 hours  tamsulosin 0.4 milliGRAM(s) Oral at bedtime      PMHX/PSHX:  CAD (coronary artery disease)    HTN (hypertension)    HLD (hyperlipidemia)    S/P CABG x 4    History of cholecystectomy        Family history:  No pertinent family history in first degree relatives        Social History:   no etoh no cigs no ivda no prbc    ROS:     General:  No wt loss, fevers, chills, night sweats, fatigue,   Eyes:  Good vision, no reported pain  ENT:  No sore throat, pain, runny nose, dysphagia  CV:  No pain, palpitations, hypo/hypertension  Resp:  No dyspnea, cough, tachypnea, wheezing  GI:  No pain, No nausea, No vomiting, No diarrhea, No constipation, No weight loss, No fever, No pruritis, No rectal bleeding, No tarry stools, No dysphagia,  :  No pain, bleeding, incontinence, nocturia  Muscle:  No pain, weakness  Neuro:  No weakness, tingling, memory problems  Psych:  No fatigue, insomnia, mood problems, depression  Endocrine:  No polyuria, polydipsia, cold/heat intolerance  Heme:  No petechiae, ecchymosis, easy bruisability  Skin:  No rash, tattoos, scars, edema      PHYSICAL EXAM:   Vital Signs:  Vital Signs Last 24 Hrs  T(C): 36.4 (17 Jun 2022 13:03), Max: 36.8 (17 Jun 2022 09:23)  T(F): 97.6 (17 Jun 2022 13:03), Max: 98.2 (17 Jun 2022 09:23)  HR: 95 (17 Jun 2022 13:03) (68 - 95)  BP: 103/62 (17 Jun 2022 13:03) (103/62 - 138/73)  BP(mean): --  RR: 19 (17 Jun 2022 13:03) (16 - 19)  SpO2: 96% (17 Jun 2022 13:03) (94% - 96%)  Daily     Daily     GENERAL:  Appears stated age, well-groomed, well-nourished, no distress  HEENT:  NC/AT,  conjunctivae clear and pink, no thyromegaly, nodules, adenopathy, no JVD, sclera -anicteric  CHEST:  Full & symmetric excursion, no increased effort, breath sounds clear  HEART:  Regular rhythm, S1, S2, no murmur/rub/S3/S4, no abdominal bruit, no edema  ABDOMEN:  Soft, non-tender, non-distended, normoactive bowel sounds,  no masses ,no hepato-splenomegaly, no signs of chronic liver disease  EXTEREMITIES:  no cyanosis,clubbing or edema  SKIN:  No rash/erythema/ecchymoses/petechiae/wounds/abscess/warm/dry  NEURO:  Alert, oriented, no asterixis, no tremor, no encephalopathy    LABS:                        10.2   18.28 )-----------( 141      ( 17 Jun 2022 14:50 )             31.2     06-17    142  |  108  |  16  ----------------------------<  142<H>  4.7   |  29  |  0.86    Ca    8.7      17 Jun 2022 05:34    TPro  6.6  /  Alb  3.4  /  TBili  0.8  /  DBili  x   /  AST  24  /  ALT  34  /  AlkPhos  73  06-17    LIVER FUNCTIONS - ( 17 Jun 2022 05:34 )  Alb: 3.4 g/dL / Pro: 6.6 g/dL / ALK PHOS: 73 U/L / ALT: 34 U/L / AST: 24 U/L / GGT: x           PT/INR - ( 17 Jun 2022 14:50 )   PT: 13.7 sec;   INR: 1.19 ratio         PTT - ( 16 Jun 2022 23:45 )  PTT:26.1 sec    Amylase Serum--      Lipase [id number]       Ammonia--      Imaging:

## 2022-06-17 NOTE — H&P ADULT - PROBLEM SELECTOR PLAN 4
- H/H 11.0/33.7 on admission, baseline hemoglobin unknown  - Currently hemodynamically stable, monitor for signs and symptoms of active bleeding  - Consider transfusion for hemoglobin <7  - Maintain active type and screen  - F/u iron panel: iron, ferritin, TIBC, transferrin  - F/u folate, vitamin B12  - F/u AM CBC

## 2022-06-17 NOTE — H&P ADULT - PROBLEM SELECTOR PLAN 3
WBC 16.17 on admission likely reactive  - Afebrile, appears non toxic  - Monitor off antibiotics at this time  - F/u AM CBC

## 2022-06-17 NOTE — ED ADULT NURSE NOTE - CHIEF COMPLAINT QUOTE
per ems from home, with witnessed syncopal episode tonight, no head strike, vomiting with ems, zofran given by ems. earlier today had mechanical trip and fall, left leg pain

## 2022-06-17 NOTE — H&P ADULT - HISTORY OF PRESENT ILLNESS
88 year old male with PMHx CAD s/p CABG x4, HTN, HLD, BPH, presents to the ED after fall at home yesterday afternoon. Patient was found on the floor in the kitchen, unwitnessed fall, and had landed on his left side. After the fall family helped him up an moved him to the recliner where he laid down in extreme pain. He then passed out from the pain as per wife for about 1 minute. Patient regained consciousness and was brought to the ED. Patient denies memory of passing out. He also denies fever, chills, cp, sob, abd pain, n/v/d. Patient seen by cardiology and transferred to [facility] for further orthopedic workup and management      88 year old male with PMHx CAD s/p CABG x4, HTN, HLD, BPH, presents to the ED after fall at home yesterday afternoon. Patient was found on the floor in the kitchen, unwitnessed fall, and had landed on his left side. After the fall family helped him up an moved him to the recliner where he laid down in extreme pain. He then passed out from the pain as per wife for about 1 minute. Patient regained consciousness and was brought to the ED. Patient denies memory of passing out. He also denies fever, chills, cp, sob, abd pain, n/v/d. Patient seen by cardiology and transferred to [facility] for further orthopedic workup and management. Per patient, he was nauseous en route from [facility] to ED and was coughing up dark Emesis. He has no hx of GI bleed and denies any hx of same.      88 year old male with PMHx CAD s/p CABG x4, HTN, HLD, BPH, presents to the ED after fall at home yesterday afternoon. Patient was found on the floor in the kitchen, unwitnessed fall, and had landed on his left side. After the fall family helped him up an moved him to the recliner where he laid down in extreme pain. He then passed out from the pain as per wife for about 1 minute. Patient regained consciousness and was brought to the ED. Patient denies memory of passing out. He also denies fever, chills, cp, sob, abd pain, n/v/d. Patient seen by cardiology and transferred to [facility] for further orthopedic workup and management. Per patient, he was nauseous en route from [facility] to ED and was coughing up dark Emesis. He has no hx of GI bleed and denies any hx of same. Patient has been on ASA long term for hx of CAD

## 2022-06-17 NOTE — CONSULT NOTE ADULT - ASSESSMENT
88 year old male with PMHx CAD s/p CABG x4, HTN, HLD, BPH, presents to the ED after fall at home yesterday afternoon. Patient was found on the floor in the kitchen, unwitnessed fall, and had landed on his left side 88 year old male with PMHx CAD s/p CABG x4, HTN, HLD, BPH, presents to the ED after fall at home yesterday afternoon. Patient was found on the floor in the kitchen, unwitnessed fall, and had landed on his left side    cad  cabg  heme occult positive  BPH  HTN  HLD  eval for GI bleed    ortho eval  cardio eval  cardiac catheterization July 20, 2021 demonstrating patent LIMA to the LAD, SVG to RCA, SVG to diagonal and a closed SVG to OM.  EF 45%. No stenting was performed  GI eval - GI bleed eval   cvs rx regimen  serial H and H  monitor VS and HD and Sat  I yuri  dvt p - seq teds  cyn op optimization and management in progress - plan for eventual OR with ORTHO

## 2022-06-17 NOTE — H&P ADULT - PROBLEM SELECTOR PLAN 1
Patient presents s/p syncopal episode  - Admit to F  - Xray L femur and hip: pending official read  - Given Morphine 4 mg IVP x1, Zofran 4 mg IVP x1  - Fall precautions  - Check orthostatic vitals when able given fracture of L hip  - PT eval  - Social work eval  - Ortho consulted, f/u recs Patient presents s/p unwitnessed fall at home  - Admit to F  - Xray L femur and hip: suspicious for fracture; pending official read  - Given Morphine 4 mg IVP x1, Zofran 4 mg IVP x1  - continue pain medication prn   - Fall precautions  - Check orthostatic vitals when able given fracture of L hip s/p fall  - PT eval  - Social work eval  - Ortho consulted, f/u recs Patient presents s/p unwitnessed fall at home  - Admit to [facility] plan for OR with ortho   - Xray L femur and hip: suspicious for fracture; pending official read  - Given Morphine 4 mg IVP x1, Zofran 4 mg IVP x1  - continue pain medication prn   - Fall precautions  - f/u orthostatics when able  - PT eval  - RCRI Class II risk for low-intermediate risk surgery f/u cardiology clearance Patient presents s/p unwitnessed fall at home  - Admit to [facility]   - Xray L femur and hip: suspicious for fracture; pending official read  - Given Morphine 4 mg IVP x1, Zofran 4 mg IVP x1  - continue pain medication prn   - Fall precautions  - PT eval  - RCRI Class II risk for low-intermediate risk surgery f/u cardiology clearance  - npo for likely or in am

## 2022-06-17 NOTE — ED ADULT NURSE NOTE - OBJECTIVE STATEMENT
Brought in ambulance reports patient had syncopal episode tonight. Brought in ambulance reports patient had syncopal episode tonight. As per wife patient fell earlier this afternoon on his left side c/o left hip pain. Denies hitting head. Brought in ambulance reports patient had syncopal episode tonight. As per wife patient fell  on his left side c/o left hip pain. Denies hitting head.

## 2022-06-17 NOTE — CONSULT NOTE ADULT - ASSESSMENT
anemia  fracture  gi bleed  likely esopahgitis  hgb stable    plan  ppi bid and carafcate q 6 hours  gerd precautions  monitor cbc and transfuse as needed  in and out  to follow up with clinical status  may go for planned surgery and to  get a/c with gi prophylaxis    Advanced care planning was discussed with patient and family.  Advanced care planning forms were reviewed and discussed.  Risks, benefits and alternatives of gastroenterologic procedures were discussed in detail and all questions were answered.    30 minutes spent.

## 2022-06-17 NOTE — H&P ADULT - NSHPPHYSICALEXAM_GEN_ALL_CORE
GENERAL: NAD, elderly   EYES: sclera clear, no exudates  ENMT: moist mucous membranes  NECK: supple, soft, no thyromegaly noted  LUNGS: good air entry bilaterally, clear to auscultation, symmetric breath sounds, no wheezing or rhonchi appreciated  HEART: soft S1/S2, regular rate and rhythm, no murmurs noted, trace LE edema   GASTROINTESTINAL: abdomen is soft, nontender, nondistended, normoactive bowel sounds, no palpable masses  INTEGUMENT: good skin turgor, no lesions noted  MUSCULOSKELETAL: left  hip pain rom ltd due to pain, right hip full rom, sensation in tact, left hip tenderness to palpation   NEUROLOGIC: awake, alert, oriented x3  HEME/LYMPH: no obvious ecchymosis or petechiae

## 2022-06-17 NOTE — H&P ADULT - PROBLEM SELECTOR PLAN 9
SCD's given possible OR today with ortho     IMPROVE VTE Individual Risk Assessment          RISK                                                          Points  [  ] Previous VTE                                                3  [  ] Thrombophilia                                             2  [  ] Lower limb paralysis                                   2        (unable to hold up >15 seconds)    [  ] Current Cancer                                             2         (within 6 months)  [  ] Immobilization > 24 hrs                              1  [  ] ICU/CCU stay > 24 hours                             1  [ x ] Age > 60                                                         1    IMPROVE VTE Score: 1

## 2022-06-17 NOTE — CONSULT NOTE ADULT - SUBJECTIVE AND OBJECTIVE BOX
HPI:  87YO M PMH CAD s/p CABG x4, HTN, HLD, BPH, presented to the [facility] ED sp fall at home found to have left hip intertrochanteric fracture. Transferred to [facility] for orthopedic intervention. However pt was nauseous en route from [facility] to [facility]  ED and was coughing up dark  coffee ground emesis.  WBC 18K.  No fever chills abd pain urinary symptoms.     Infectious Disease consult was called to evaluate pt and for antibiotic management.    Past Medical & Surgical Hx:  PAST MEDICAL & SURGICAL HISTORY:  CAD (coronary artery disease)  HTN (hypertension)  HLD (hyperlipidemia)  S/P CABG x 4  History of cholecystectomy      Social History--  EtOH: denies  Tobacco: denies   Drug Use: denies      FAMILY HISTORY:  No pertinent family history in first degree relatives        Allergies  No Known Allergies    Intolerances  none      Home Medications:  acetaminophen 325 mg oral tablet: 2 tab(s) orally every 6 hours, As needed, Mild Pain (1 - 3) (17 Jun 2022 10:05)  Aspir 81 oral delayed release tablet: 1 tab(s) orally once a day (17 Jun 2022 03:51)  atorvastatin 20 mg oral tablet: 1 tab(s) orally once a day (17 Jun 2022 03:51)  hydroCHLOROthiazide 12.5 mg oral tablet: 1 tab(s) orally every other day (17 Jun 2022 03:51)  HYDROmorphone: 0.5 milligram(s) injectable every 4 hours, As Needed for severe pain (17 Jun 2022 10:05)  losartan 50 mg oral tablet: 1 tab(s) orally once a day (17 Jun 2022 03:51)  Metoprolol Succinate ER 25 mg oral tablet, extended release: 1 tab(s) orally once a day (17 Jun 2022 03:51)  morphine: 2 milligram(s) injectable every 4 hours, As Needed moderate pain (17 Jun 2022 10:05)  polyethylene glycol 3350 oral powder for reconstitution: 17 gram(s) orally once a day, As needed, Constipation (17 Jun 2022 10:05)  senna oral tablet: 2 tab(s) orally once a day (at bedtime) (17 Jun 2022 10:05)  tamsulosin 0.4 mg oral capsule: 1 cap(s) orally once a day (17 Jun 2022 03:51)      Current Inpatient Medications :    ANTIBIOTICS:       OTHER RELEVANT MEDICATIONS :  acetaminophen     Tablet .. 650 milliGRAM(s) Oral every 6 hours PRN  atorvastatin 20 milliGRAM(s) Oral at bedtime  HYDROmorphone  Injectable 0.5 milliGRAM(s) IV Push every 6 hours PRN  lactated ringers. 1000 milliLiter(s) IV Continuous <Continuous>  melatonin 3 milliGRAM(s) Oral at bedtime PRN  morphine  - Injectable 2 milliGRAM(s) IV Push every 6 hours PRN  ondansetron Injectable 4 milliGRAM(s) IV Push every 6 hours PRN  pantoprazole    Tablet 40 milliGRAM(s) Oral every 12 hours  polyethylene glycol 3350 17 Gram(s) Oral daily PRN  senna 2 Tablet(s) Oral at bedtime  sucralfate suspension 1 Gram(s) Oral every 6 hours  tamsulosin 0.4 milliGRAM(s) Oral at bedtime      ROS:  CONSTITUTIONAL:  Negative fever or chills  EYES:  Negative  blurry vision or double vision  CARDIOVASCULAR:  Negative for chest pain or palpitations  RESPIRATORY:  Negative for cough, wheezing, or SOB   GASTROINTESTINAL:  Negative for diarrhea, constipation, or abdominal pain +nausea, vomiting,  GENITOURINARY:  Negative frequency, urgency , dysuria or hematuria   NEUROLOGIC:  No headache, confusion, dizziness, lightheadedness  All other systems were reviewed and are negative        I&O's Detail    17 Jun 2022 07:01  -  17 Jun 2022 22:30  --------------------------------------------------------  IN:    Lactated Ringers: 240 mL  Total IN: 240 mL    OUT:  Total OUT: 0 mL    Total NET: 240 mL          Physical Exam:  Vital Signs Last 24 Hrs  T(C): 36.4 (17 Jun 2022 13:03), Max: 36.8 (17 Jun 2022 09:23)  T(F): 97.6 (17 Jun 2022 13:03), Max: 98.2 (17 Jun 2022 09:23)  HR: 95 (17 Jun 2022 13:03) (68 - 95)  BP: 103/62 (17 Jun 2022 13:03) (103/62 - 138/73)  RR: 19 (17 Jun 2022 13:03) (16 - 19)  SpO2: 96% (17 Jun 2022 13:03) (94% - 96%)      General: well developed well nourished, in no acute distress  Neck: supple, trachea midline  Lungs: clear, no wheeze/rhonchi  Cardiovascular: regular rate and rhythm, S1 S2  Abdomen: soft, nontender, ND, bowel sounds normal  Neurological:  alert and oriented x3  Skin: no rash  Extremities: no cyanosis/clubbing/edema    Labs:  Complete Blood Count + Automated Diff (06.17.22 @ 14:50)    WBC Count: 18.28 K/uL    RBC Count: 3.01 M/uL    Hemoglobin: 10.2 g/dL    Hematocrit: 31.2 %    Mean Cell Volume: 103.7 fl    Mean Cell Hemoglobin: 33.9 pg    Mean Cell Hemoglobin Conc: 32.7 gm/dL    Red Cell Distrib Width: 13.6 %    Platelet Count - Automated: 141 K/uL      06-17    142  |  108  |  16  ----------------------------<  142<H>  4.7   |  29  |  0.86    Ca    8.7      17 Jun 2022 05:34    TPro  6.6  /  Alb  3.4  /  TBili  0.8  /  DBili  x   /  AST  24  /  ALT  34  /  AlkPhos  73  06-17        RECENT CULTURES:          RADIOLOGY & ADDITIONAL STUDIES:  ACC: [id number] EXAM:  XR CHEST PORTABLE URGENT 1V                          PROCEDURE DATE:  06/17/2022          INTERPRETATION:  AP semierect chest on June 17, 2022 at 2:46 PM. Patient   has vomiting.    Heart magnified by technique.    Sternotomy again noted.    There is an increasing left base atelectatic findings.    This is compared to earlier in the day.    IMPRESSION: Increasing left base atelectatic findings.    ACC: [id number] EXAM:  XR CHEST AP OR PA 1V                        ACC: [id number] EXAM:  XR FEMUR 2 VIEWS LT                        ACC: [id number] EXAM:  XR HIP WITH PELV 2-3V LT                          PROCEDURE DATE:  06/17/2022          INTERPRETATION:  Left hip with pelvis, left femur, and chest. Patient had   a fall with local trauma.    Left hip with pelvis. 3 views. 4 images.    There is rather mild symmetric hip degeneration.    There is density in the L4 body suggesting vertebroplasty.    There is a comminuted fracture content fracture of the left hip with   increased angulation at the fracture site.    Left femur. 2 views of the lower left femur show no effusion and moderate   knee degeneration.    AP chest on June 17, 2022 at 1:19 AM.    COMPARISON: None available.    Heart magnified by technique. Sternotomy noted.    Lungs clear. No fracture.    IMPRESSION: Comminuted left hip intertrochanteric fracture.    Assessment :   87YO M PMH CAD s/p CABG x4, HTN, HLD, BPH, admitted sp fall with left hip intertrochanteric fracture. Transferred to [facility] for orthopedic intervention. However pt was nauseous en route from [facility] to [facility]  ED and was coughing up dark  coffee ground emesis.  WBC 18K.  Suspect leukocytosis reactive  GIB  No clear clinical evidence for infectious process    Plan :   Defer antibiotics  Trend temp and cbc  Fu cultures  Monitor H/H  Pulm toileting  Asp precautions    D/w Dr Geiger    Continue with present regiment .  Approptiate use of antibiotics and adverse effects reviewed.      I have discussed the above plan of care with patient in detail. I have asked if he has any questions or concerns and appropriately addressed them to the best of my ability .      > 45 minutes spent in direct patient care reviewing  the notes, lab data/ imaging , discussion with multidisciplinary team. All questions were addressed and answered to the best of my capacity .    Thank you for allowing me to participate in the care of your patient .      Stacy Dhaliwal MD  Infectious Disease  [phone]

## 2022-06-17 NOTE — PROGRESS NOTE ADULT - SUBJECTIVE AND OBJECTIVE BOX
Patient was seen and examined at bedside. Denies CP/SOB/Dizziness, N/V/D, HA. Pain is controlled. Patient states he is having some muscle spasms on LLE but has improved with pain medication.     Vital Signs Last 24 Hrs  T(C): 36.6 (17 Jun 2022 05:55), Max: 36.6 (17 Jun 2022 05:55)  T(F): 97.8 (17 Jun 2022 05:55), Max: 97.8 (17 Jun 2022 05:55)  HR: 68 (17 Jun 2022 05:55) (68 - 73)  BP: 138/73 (17 Jun 2022 05:55) (124/65 - 138/73)  BP(mean): --  RR: 18 (17 Jun 2022 05:55) (16 - 19)  SpO2: 94% (17 Jun 2022 05:55) (94% - 96%)    GEN: NAD  LLE: Skin intact, fires EHL/TA/GS, SILT, Extremities are warm, compartments are soft, DP 1+ b/l LE.     Labs:                          11.1   17.97 )-----------( 139      ( 17 Jun 2022 05:34 )             34.1       06-17    142  |  108  |  16  ----------------------------<  142<H>  4.7   |  29  |  0.86    Ca    8.7      17 Jun 2022 05:34    TPro  6.6  /  Alb  3.4  /  TBili  0.8  /  DBili  x   /  AST  24  /  ALT  34  /  AlkPhos  73  06-17      A/P: Patient is a 88y y/o Male with Left IT Fracture    -Appreciate medical management  -Pain control/analgesia  -NWB LLE  -Discussed with Dr. Bonilla, plan to transfer to [place] for operative fixation if medically stable.   -Discussed with patient and patients wife who understand and agree with plan.

## 2022-06-17 NOTE — ED ADULT NURSE NOTE - NSIMPLEMENTINTERV_GEN_ALL_ED
Implemented All Fall with Harm Risk Interventions:  [place] to call system. Call bell, personal items and telephone within reach. Instruct patient to call for assistance. Room bathroom lighting operational. Non-slip footwear when patient is off stretcher. Physically safe environment: no spills, clutter or unnecessary equipment. Stretcher in lowest position, wheels locked, appropriate side rails in place. Provide visual cue, wrist band, yellow gown, etc. Monitor gait and stability. Monitor for mental status changes and reorient to person, place, and time. Review medications for side effects contributing to fall risk. Reinforce activity limits and safety measures with patient and family. Provide visual clues: red socks.

## 2022-06-17 NOTE — H&P ADULT - ATTENDING COMMENTS
88 year old male with PMHx CAD s/p CABG x4, HTN, HLD, BPH, presented to the ED after fall at home yesterday afternoon admitted for left hip fracture with planned OR with orthopedic surgery.    Assessment and plan as above. Edited where appropriate directly into the body of the note.

## 2022-06-17 NOTE — ED ADULT NURSE REASSESSMENT NOTE - NS ED NURSE REASSESS COMMENT FT1
1139:  transfer team arrived to take patient to [address], [address]. 1139:  transfer team arrived to take patient to [place], [facility], [address].   Patient was medicated for pain prior to transfer.  the patient continues to have some swelling noted to the left hip.  His wife was called and told that he will be leaving for [place] shortly (I called her on cell number [phone]).  the patient left the ed in stable condition, no distress and with all belongings.  natalie ivory.

## 2022-06-17 NOTE — H&P ADULT - NSHPREVIEWOFSYSTEMS_GEN_ALL_CORE
Constitutional: denies fever, chills  HEENT: denies blurry vision  Respiratory: denies SOB, cough, sputum production  Cardiovascular: denies CP, palpitations, edema  Gastrointestinal: denies nausea, vomiting, diarrhea, constipation, abdominal pain  Genitourinary: denies dysuria, frequency, urgency, hematuria   Skin/Breast: denies rash, itching  MSK: admits L hip pain  Neurologic: denies headache, weakness, dizziness, numbness/tingling  Psychiatric: denies anxiety, depression  ROS negative except as noted above

## 2022-06-17 NOTE — H&P ADULT - NSHPPHYSICALEXAM_GEN_ALL_CORE
T(C): 36.3 (06-16-22 @ 23:15), Max: 36.3 (06-16-22 @ 23:15)  HR: 73 (06-16-22 @ 23:15) (73 - 73)  BP: 124/65 (06-16-22 @ 23:15) (124/65 - 124/65)  RR: 16 (06-16-22 @ 23:15) (16 - 16)  SpO2: 95% (06-16-22 @ 23:15) (95% - 95%)    GENERAL: patient appears well, no acute distress, appropriate, pleasant  EYES: sclera clear, no exudates  ENMT: oropharynx clear without erythema, no exudates, moist mucous membranes  NECK: supple, soft, no thyromegaly noted  LUNGS: good air entry bilaterally, clear to auscultation, symmetric breath sounds, no wheezing or rhonchi appreciated  HEART: soft S1/S2, regular rate and rhythm, no murmurs noted, no lower extremity edema  GASTROINTESTINAL: abdomen is soft, nontender, nondistended, normoactive bowel sounds, no palpable masses  INTEGUMENT: good skin turgor, no lesions noted  MUSCULOSKELETAL: no clubbing or cyanosis, no obvious deformity  NEUROLOGIC: awake, alert, oriented x3, good muscle tone in 4 extremities, no obvious sensory deficits  PSYCHIATRIC: mood is good, affect is congruent, linear and logical thought process  HEME/LYMPH: no palpable supraclavicular nodules, no obvious ecchymosis or petechiae T(C): 36.3 (06-16-22 @ 23:15), Max: 36.3 (06-16-22 @ 23:15)  HR: 73 (06-16-22 @ 23:15) (73 - 73)  BP: 124/65 (06-16-22 @ 23:15) (124/65 - 124/65)  RR: 16 (06-16-22 @ 23:15) (16 - 16)  SpO2: 95% (06-16-22 @ 23:15) (95% - 95%)    GENERAL: patient appears well, no acute distress, appropriate, pleasant  EYES: sclera clear, no exudates  ENMT: oropharynx clear without erythema, no exudates, moist mucous membranes  NECK: supple, soft, no thyromegaly noted  LUNGS: good air entry bilaterally, clear to auscultation, symmetric breath sounds, no wheezing or rhonchi appreciated  HEART: soft S1/S2, regular rate and rhythm, no murmurs noted, trace LE edema   GASTROINTESTINAL: abdomen is soft, nontender, nondistended, normoactive bowel sounds, no palpable masses  INTEGUMENT: good skin turgor, no lesions noted  MUSCULOSKELETAL: left  hip pain limited rom due to extreme pain, right hip full rom, sensation in tact, left hip tenderness to palpation   NEUROLOGIC: awake, alert, oriented x3  HEME/LYMPH: no obvious ecchymosis or petechiae

## 2022-06-17 NOTE — DISCHARGE NOTE PROVIDER - CARE PROVIDER_API CALL
Arturo Bonilla)  Se Marlow  Physicians  [address] [place] [address]  Phone: [phone]  Fax: [phone]  Follow Up Time: 1-3 days

## 2022-06-17 NOTE — PATIENT PROFILE ADULT - FALL HARM RISK - HARM RISK INTERVENTIONS

## 2022-06-17 NOTE — H&P ADULT - NSHPSOCIALHISTORY_GEN_ALL_CORE
EtOH: social  Recreational drug use: denies  Ambulates: with cane as of recently given right shoulder injury ~2 months ago  ADLs: with assistance   Vaccinations: Moderna with booster

## 2022-06-17 NOTE — H&P ADULT - NSHPREVIEWOFSYSTEMS_GEN_ALL_CORE
Constitutional: denies fever, chills  HEENT: denies blurry vision  Respiratory: denies SOB, cough, sputum production  Cardiovascular: denies CP, palpitations, edema  Gastrointestinal: denies nausea, vomiting, diarrhea, constipation, abdominal pain  Genitourinary: denies dysuria, frequency, urgency, hematuria   Skin/Breast: denies rash, itching  MSK: admits L hip pain  Neurologic: denies headache, weakness, dizziness, numbness/tingling  Psychiatric: denies anxiety, depression Constitutional: denies fever, chills  HEENT: denies blurry vision  Respiratory: denies SOB, cough, sputum production  Cardiovascular: denies CP, palpitations, edema  Gastrointestinal: Admits nausea, vomiting, no diarrhea, constipation, abdominal pain  Genitourinary: denies dysuria, frequency, urgency, hematuria   Skin/Breast: denies rash, itching  MSK: admits L hip pain  Neurologic: denies headache, weakness, dizziness, numbness/tingling  Psychiatric: denies anxiety, depression

## 2022-06-17 NOTE — CONSULT NOTE ADULT - SUBJECTIVE AND OBJECTIVE BOX
Date/Time Patient Seen:  		  Referring MD:   Data Reviewed	       Patient is a 88y old  Male who presents with a chief complaint of     Subjective/HPI  in bed  seen and examined  vs noted  labs reviewed  H and P reviewed  ER provider note reviewed  transfer from [facility]     History and Physical:   Outpatient Providers	Dr. De La Rosa (cardio)  Dr. Yeimi Aguilar (PCP)      Patient Identity   · Birth Sex	Male    History of Present Illness   History of Present Illness:   88 year old male with PMHx CAD s/p CABG x4, HTN, HLD, BPH, presents to the ED after fall at home yesterday afternoon. Patient was found on the floor in the kitchen, unwitnessed fall, and had landed on his left side. After the fall family helped him up an moved him to the recliner where he laid down in extreme pain. He then passed out from the pain as per wife for about 1 minute. Patient regained consciousness and was brought to the ED. Patient denies memory of passing out. He also denies fever, chills, cp, sob, abd pain, n/v/d. Patient seen by cardiology and transferred to [facility] for further orthopedic workup and management. Per patient, he was nauseous en route from [facility] to ED and was coughing up dark Emesis. He has no hx of GI bleed and denies any hx of same. Patient has been on ASA long term for hx of CAD  PAST MEDICAL & SURGICAL HISTORY:  CAD (coronary artery disease)    HTN (hypertension)    HLD (hyperlipidemia)    S/P CABG x 4    History of cholecystectomy          Medication list         MEDICATIONS  (STANDING):  atorvastatin 20 milliGRAM(s) Oral at bedtime  pantoprazole Infusion 8 mG/Hr (10 mL/Hr) IV Continuous <Continuous>  senna 2 Tablet(s) Oral at bedtime  sucralfate 1 Gram(s) Oral two times a day  tamsulosin 0.4 milliGRAM(s) Oral at bedtime    MEDICATIONS  (PRN):  acetaminophen     Tablet .. 650 milliGRAM(s) Oral every 6 hours PRN Temp greater or equal to 38C (100.4F), Mild Pain (1 - 3)  HYDROmorphone  Injectable 0.5 milliGRAM(s) IV Push every 6 hours PRN Severe Pain (7 - 10)  melatonin 3 milliGRAM(s) Oral at bedtime PRN Insomnia  morphine  - Injectable 2 milliGRAM(s) IV Push every 6 hours PRN Moderate Pain (4 - 6)  ondansetron Injectable 4 milliGRAM(s) IV Push every 6 hours PRN Nausea and/or Vomiting  polyethylene glycol 3350 17 Gram(s) Oral daily PRN Constipation         Vitals log        ICU Vital Signs Last 24 Hrs  T(C): 36.4 (17 Jun 2022 13:03), Max: 36.8 (17 Jun 2022 09:23)  T(F): 97.6 (17 Jun 2022 13:03), Max: 98.2 (17 Jun 2022 09:23)  HR: 95 (17 Jun 2022 13:03) (68 - 95)  BP: 103/62 (17 Jun 2022 13:03) (103/62 - 138/73)  BP(mean): --  ABP: --  ABP(mean): --  RR: 19 (17 Jun 2022 13:03) (16 - 19)  SpO2: 96% (17 Jun 2022 13:03) (94% - 96%)       PAST SURGICAL HISTORY:  History of cholecystectomy     S/P CABG x 4.     FAMILY HISTORY:  No pertinent family history in first degree relatives. No pertinent family history of: coronary disease.    Social History   Social History (marital status, living situation, occupation, tobacco use, alcohol and drug use, and sexual history): EtOH: social  Recreational drug use: denies  Ambulates: with cane as of recently given right shoulder injury ~2 months ago  ADLs: with assistance   Vaccinations: Moderna with booster    Tobacco Screening   · Core Measure Site	No    Risk Assessment:   Present on Admission   Deep Venous Thrombosis	no  Pulmonary Embolus	no    Heart Failure   Does this patient have a history of or has been diagnosed with heart failure? no.    Input and Output:  I&O's Detail      Lab Data                        10.2   18.28 )-----------( 141      ( 17 Jun 2022 14:50 )             31.2     06-17    142  |  108  |  16  ----------------------------<  142<H>  4.7   |  29  |  0.86    Ca    8.7      17 Jun 2022 05:34    TPro  6.6  /  Alb  3.4  /  TBili  0.8  /  DBili  x   /  AST  24  /  ALT  34  /  AlkPhos  73  06-17      CARDIAC MARKERS ( 17 Jun 2022 05:34 )  x     / x     / 120 U/L / x     / x            Review of Systems	  pain  fall      Objective     Physical Examination    heart s1s2  lung dec BS  abd soft  head nc  verbal  alert  on RA      Pertinent Lab findings & Imaging      Wright:  NO   Adequate UO     I&O's Detail           Discussed with:     Cultures:	        Radiology      ACC: [id number] EXAM:  XR CHEST PORTABLE URGENT 1V                          PROCEDURE DATE:  06/17/2022          INTERPRETATION:  AP semierect chest on June 17, 2022 at 2:46 PM. Patient   has vomiting.    Heart magnified by technique.    Sternotomy again noted.    There is an increasing left base atelectatic findings.    This is compared to earlier in the day.    IMPRESSION: Increasing left base atelectatic findings.    --- End of Report ---            ROBB NORRIS MD; Attending Radiologist  This document has been electronically signed. Jun 17 2022  3:01PM

## 2022-06-17 NOTE — H&P ADULT - PROBLEM SELECTOR PLAN 5
Chronic, stable on admission  - Continue home medications HCTZ 12.5 mg PO qd, Losartan 50 mg PO qd and Metoprolol Succinate ER 25 mg PO qd with hold parameters  - Monitor routine hemodynamics Chronic, stable on admission  - Continue home medications HCTZ 12.5 mg PO qod, Losartan 50 mg PO qd and Metoprolol Succinate ER 25 mg PO qd with hold parameters  - Monitor routine hemodynamics s/p CABG x4 2003  - continue home aspirin and statin  - stable, no signs or sx of ACS  - cardiology Dr. Petersen consulted f/u recs

## 2022-06-17 NOTE — CONSULT NOTE ADULT - MUSCULOSKELETAL
no calf tenderness/strength 5/5 bilateral upper extremities/strength 5/5 bilateral lower extremities details…

## 2022-06-17 NOTE — DISCHARGE NOTE PROVIDER - ATTENDING DISCHARGE PHYSICAL EXAMINATION:
Vitals:  T:  98.2  P:  71  BP:  132/71  RR:  17  SpO2:  95%RA  GENERAL: patient appears well, no acute distress, appropriate, pleasant  EYES: sclera clear, no exudates  ENMT: oropharynx clear without erythema, no exudates, moist mucous membranes  NECK: supple, soft, no thyromegaly noted  LUNGS: good air entry bilaterally, clear to auscultation, symmetric breath sounds, no wheezing or rhonchi appreciated  HEART: soft S1/S2, regular rate and rhythm, no murmurs noted, trace LE edema   GASTROINTESTINAL: abdomen is soft, nontender, nondistended, normoactive bowel sounds, no palpable masses  INTEGUMENT: good skin turgor, no lesions noted  MUSCULOSKELETAL: left  hip pain limited rom due to extreme pain, right hip full rom, sensation in tact, left hip tenderness to palpation   NEUROLOGIC: awake, alert, oriented x3  HEME/LYMPH: no obvious ecchymosis or petechiae

## 2022-06-17 NOTE — CONSULT NOTE ADULT - MUSCULOSKELETAL COMMENTS
left hip no gross deformities, +tenderness to palpation of left hip, no tenderness in remainder of extremity, hip rom limited 2nd to pain, +log roll and heel strike, full painless ROM of remainder of extremity, SILT

## 2022-06-17 NOTE — H&P ADULT - HISTORY OF PRESENT ILLNESS
88 year old male with PMHx HTN, HLD, BPH presents to the ED ---    ED course:  Vitals: T 97.4 HR 73 /65 RR 16 SpO2 95% on RA  Labs: WBC 16.17, H/H 11.0/33.7, .0  CXR: pending official read  Xray L femur and hip: pending official read  Given Morphine 4 mg IVP x1, Zofran 4 mg IVP x1 88 year old male with PMHx CAD s/p CABG x4, HTN, HLD, BPH, presents to the ED after fall at home yesterday afternoon. Patient was found on the floor in the kitchen, unwitnessed fall, and had landed on his left side. After the fall family helped him up an moved him to the recliner where he laid down in extreme pain. He then passed out from the pain as per wife for about 1 minute. Patient regained consciousness and was brought to the ED. Patient denies memory of passing out. He also denies fever, chills, cp, sob, abd pain, n/v/d.     ED course:  Vitals: T 97.4 HR 73 /65 RR 16 SpO2 95% on RA  Labs: WBC 16.17, H/H 11.0/33.7, .0  CXR: pending official read  Xray L femur and hip: pending official read  Given Morphine 4 mg IVP x1, Zofran 4 mg IVP x1 88 year old male with PMHx CAD s/p CABG x4, HTN, HLD, BPH, presents to the ED after fall at home yesterday afternoon. Patient was found on the floor in the kitchen, unwitnessed fall, and had landed on his left side. After the fall family helped him up an moved him to the recliner where he laid down in extreme pain. He then passed out from the pain as per wife for about 1 minute. Patient regained consciousness and was brought to the ED. Patient denies memory of passing out. He also denies fever, chills, cp, sob, abd pain, n/v/d  ED course:  Vitals: T 97.4 HR 73 /65 RR 16 SpO2 95% on RA  Labs: WBC 16.17, H/H 11.0/33.7, .0  CXR: pending official read  Xray L femur and hip: pending official read  Given Morphine 4 mg IVP x1, Zofran 4 mg IVP x1

## 2022-06-17 NOTE — DISCHARGE NOTE PROVIDER - HOSPITAL COURSE
88 year old male with PMHx CAD s/p CABG x4, HTN, HLD, BPH, presents to the ED after fall at home yesterday afternoon. Patient was found on the floor in the kitchen, unwitnessed fall, and had landed on his left side. After the fall family helped him up an moved him to the recliner where he laid down in extreme pain. He then passed out from the pain as per wife for about 1 minute. Patient regained consciousness and was brought to the ED. Patient denies memory of passing out. He also denies fever, chills, cp, sob, abd pain, n/v/d  ED course:  Vitals: T 97.4 HR 73 /65 RR 16 SpO2 95% on RA  Labs: WBC 16.17, H/H 11.0/33.7, .0  CXR: lungs clear  Xray L femur and hip: Comminuted left intertrochanteric fracture  Given Morphine 4 mg IVP x1, Zofran 4 mg IVP x1    Pt. was admitted with orthopedic surgery and cardiology consultation.  Pt. was given analgesic meds as needed with improved pain control.  CT left hip showed again Comminuted left intertrochanteric fracture.  Per orthopedic surgery recommendations patient will be transferred to [facility] for operative fixation.      On day of discharge patient is afebrile and hemodynamically stable.

## 2022-06-17 NOTE — H&P ADULT - PROBLEM SELECTOR PLAN 8
VTE ppx: Lovenox 40 mg subq qd    IMPROVE VTE Individual Risk Assessment          RISK                                                          Points  [  ] Previous VTE                                                3  [  ] Thrombophilia                                             2  [  ] Lower limb paralysis                                   2        (unable to hold up >15 seconds)    [  ] Current Cancer                                             2         (within 6 months)  [  ] Immobilization > 24 hrs                              1  [  ] ICU/CCU stay > 24 hours                             1  [ x ] Age > 60                                                         1    IMPROVE VTE Score: 1 SCD's given possible OR today with ortho     IMPROVE VTE Individual Risk Assessment          RISK                                                          Points  [  ] Previous VTE                                                3  [  ] Thrombophilia                                             2  [  ] Lower limb paralysis                                   2        (unable to hold up >15 seconds)    [  ] Current Cancer                                             2         (within 6 months)  [  ] Immobilization > 24 hrs                              1  [  ] ICU/CCU stay > 24 hours                             1  [ x ] Age > 60                                                         1    IMPROVE VTE Score: 1 Chronic  - Continue home Tamsulosin 0.4 mg PO qd

## 2022-06-17 NOTE — DISCHARGE NOTE PROVIDER - NSDCMRMEDTOKEN_GEN_ALL_CORE_FT
acetaminophen 325 mg oral tablet: 2 tab(s) orally every 6 hours, As needed, Mild Pain (1 - 3)  Aspir 81 oral delayed release tablet: 1 tab(s) orally once a day  atorvastatin 20 mg oral tablet: 1 tab(s) orally once a day  hydroCHLOROthiazide 12.5 mg oral tablet: 1 tab(s) orally every other day  HYDROmorphone: 0.5 milligram(s) injectable every 4 hours, As Needed for severe pain  losartan 50 mg oral tablet: 1 tab(s) orally once a day  Metoprolol Succinate ER 25 mg oral tablet, extended release: 1 tab(s) orally once a day  morphine: 2 milligram(s) injectable every 4 hours, As Needed moderate pain  polyethylene glycol 3350 oral powder for reconstitution: 17 gram(s) orally once a day, As needed, Constipation  senna oral tablet: 2 tab(s) orally once a day (at bedtime)  tamsulosin 0.4 mg oral capsule: 1 cap(s) orally once a day

## 2022-06-17 NOTE — H&P ADULT - PROBLEM SELECTOR PLAN 7
Chronic  - Continue home Tamsulosin 0.4 mg PO qd Chronic  - Continue home Atorvastatin 20 mg PO qhs and ASA 81 mg PO qd

## 2022-06-17 NOTE — DISCHARGE NOTE PROVIDER - NSDCCPCAREPLAN_GEN_ALL_CORE_FT
PRINCIPAL DISCHARGE DIAGNOSIS  Diagnosis: Fracture of left hip  Assessment and Plan of Treatment: You will be transferred to [facility] for operative fixation.  Continue analgesia as needed.      SECONDARY DISCHARGE DIAGNOSES  Diagnosis: Leukocytosis  Assessment and Plan of Treatment: You have been afebrile.  Elevated white count is likely reactive to hip fracture.    Diagnosis: Hypertension  Assessment and Plan of Treatment: Continue your antihypertensive medications    Diagnosis: CAD (coronary artery disease)  Assessment and Plan of Treatment: You were seen by Dr. De La Rosa and cleared for orthopedic surgery.  Continue your cardiac maintenance medications.     PRINCIPAL DISCHARGE DIAGNOSIS  Diagnosis: Fracture of left hip  Assessment and Plan of Treatment: You will be transferred to [facility] for operative fixation.  Continue analgesia as needed.  Follow up with Dr. Bonilla.      SECONDARY DISCHARGE DIAGNOSES  Diagnosis: Leukocytosis  Assessment and Plan of Treatment: You have been afebrile.  Elevated white count is likely reactive to hip fracture.    Diagnosis: Hypertension  Assessment and Plan of Treatment: Continue your antihypertensive medications    Diagnosis: CAD (coronary artery disease)  Assessment and Plan of Treatment: You were seen by Dr. De La Rosa and cleared for orthopedic surgery.  Continue your cardiac maintenance medications.

## 2022-06-17 NOTE — H&P ADULT - PROBLEM SELECTOR PLAN 6
Chronic  - Continue home Atorvastatin 20 mg PO qhs Chronic  - Continue home Atorvastatin 20 mg PO qhs and ASA 81 mg PO qd Chronic, stable on admission  - Continue home medications HCTZ 12.5 mg PO qod, Losartan 50 mg PO qd and Metoprolol Succinate ER 25 mg PO qd with hold parameters  - Monitor routine hemodynamics

## 2022-06-17 NOTE — CONSULT NOTE ADULT - ASSESSMENT
Patient is an 88 year old male with Left Intertrochanteric Hip Fracture    Discussed with Dr. Bonilla  Plan or OR later today or tomorrow morning pending medical clearance  Plan for ORIF Left Hip with IM Nail  Pre-Op Labs  Bed Rest, NWB LLE  Pain Control  Zofran for Nausea  NPO - IV Hydration  CT Left Hip for Pre-Op Planning

## 2022-06-17 NOTE — CHART NOTE - NSCHARTNOTEFT_GEN_A_CORE
GI note reviewed, sx likely due to esophagitis. Patient cleared by Gi for DVT ppx and no planned GI intervention. Started on diet, and will give one dose Heparin prior to midnight. Surgeon Dr. Bonilla notified.

## 2022-06-17 NOTE — H&P ADULT - ASSESSMENT
88 year old male with PMHx CAD s/p CABG x4, HTN, HLD, BPH, presented to the ED after fall at home yesterday afternoon admitted for left hip fracture with planned OR with orthopedic surgery.

## 2022-06-18 LAB
ALBUMIN SERPL ELPH-MCNC: 3.1 G/DL — LOW (ref 3.3–5)
ALP SERPL-CCNC: 53 U/L — SIGNIFICANT CHANGE UP (ref 30–120)
ALT FLD-CCNC: 27 U/L DA — SIGNIFICANT CHANGE UP (ref 10–60)
ANION GAP SERPL CALC-SCNC: 6 MMOL/L — SIGNIFICANT CHANGE UP (ref 5–17)
ANION GAP SERPL CALC-SCNC: 6 MMOL/L — SIGNIFICANT CHANGE UP (ref 5–17)
APPEARANCE UR: CLEAR — SIGNIFICANT CHANGE UP
AST SERPL-CCNC: 16 U/L — SIGNIFICANT CHANGE UP (ref 10–40)
BASOPHILS # BLD AUTO: 0.02 K/UL — SIGNIFICANT CHANGE UP (ref 0–0.2)
BASOPHILS NFR BLD AUTO: 0.1 % — SIGNIFICANT CHANGE UP (ref 0–2)
BILIRUB SERPL-MCNC: 0.9 MG/DL — SIGNIFICANT CHANGE UP (ref 0.2–1.2)
BILIRUB UR-MCNC: NEGATIVE — SIGNIFICANT CHANGE UP
BUN SERPL-MCNC: 20 MG/DL — SIGNIFICANT CHANGE UP (ref 7–23)
BUN SERPL-MCNC: 20 MG/DL — SIGNIFICANT CHANGE UP (ref 7–23)
CALCIUM SERPL-MCNC: 8.6 MG/DL — SIGNIFICANT CHANGE UP (ref 8.4–10.5)
CALCIUM SERPL-MCNC: 8.7 MG/DL — SIGNIFICANT CHANGE UP (ref 8.4–10.5)
CHLORIDE SERPL-SCNC: 104 MMOL/L — SIGNIFICANT CHANGE UP (ref 96–108)
CHLORIDE SERPL-SCNC: 105 MMOL/L — SIGNIFICANT CHANGE UP (ref 96–108)
CO2 SERPL-SCNC: 29 MMOL/L — SIGNIFICANT CHANGE UP (ref 22–31)
CO2 SERPL-SCNC: 30 MMOL/L — SIGNIFICANT CHANGE UP (ref 22–31)
COLOR SPEC: YELLOW — SIGNIFICANT CHANGE UP
CREAT SERPL-MCNC: 0.88 MG/DL — SIGNIFICANT CHANGE UP (ref 0.5–1.3)
CREAT SERPL-MCNC: 1.04 MG/DL — SIGNIFICANT CHANGE UP (ref 0.5–1.3)
DIFF PNL FLD: NEGATIVE — SIGNIFICANT CHANGE UP
EGFR: 69 ML/MIN/1.73M2 — SIGNIFICANT CHANGE UP
EGFR: 83 ML/MIN/1.73M2 — SIGNIFICANT CHANGE UP
EOSINOPHIL # BLD AUTO: 0.02 K/UL — SIGNIFICANT CHANGE UP (ref 0–0.5)
EOSINOPHIL NFR BLD AUTO: 0.1 % — SIGNIFICANT CHANGE UP (ref 0–6)
GLUCOSE SERPL-MCNC: 115 MG/DL — HIGH (ref 70–99)
GLUCOSE SERPL-MCNC: 240 MG/DL — HIGH (ref 70–99)
GLUCOSE UR QL: NEGATIVE MG/DL — SIGNIFICANT CHANGE UP
HCT VFR BLD CALC: 27.1 % — LOW (ref 39–50)
HCT VFR BLD CALC: 29 % — LOW (ref 39–50)
HGB BLD-MCNC: 8.9 G/DL — LOW (ref 13–17)
HGB BLD-MCNC: 9.4 G/DL — LOW (ref 13–17)
IMM GRANULOCYTES NFR BLD AUTO: 0.3 % — SIGNIFICANT CHANGE UP (ref 0–1.5)
KETONES UR-MCNC: NEGATIVE — SIGNIFICANT CHANGE UP
LEUKOCYTE ESTERASE UR-ACNC: NEGATIVE — SIGNIFICANT CHANGE UP
LYMPHOCYTES # BLD AUTO: 1.16 K/UL — SIGNIFICANT CHANGE UP (ref 1–3.3)
LYMPHOCYTES # BLD AUTO: 8.2 % — LOW (ref 13–44)
MCHC RBC-ENTMCNC: 32.4 GM/DL — SIGNIFICANT CHANGE UP (ref 32–36)
MCHC RBC-ENTMCNC: 32.8 GM/DL — SIGNIFICANT CHANGE UP (ref 32–36)
MCHC RBC-ENTMCNC: 34.2 PG — HIGH (ref 27–34)
MCHC RBC-ENTMCNC: 34.5 PG — HIGH (ref 27–34)
MCV RBC AUTO: 105 FL — HIGH (ref 80–100)
MCV RBC AUTO: 105.5 FL — HIGH (ref 80–100)
MONOCYTES # BLD AUTO: 2.04 K/UL — HIGH (ref 0–0.9)
MONOCYTES NFR BLD AUTO: 14.3 % — HIGH (ref 2–14)
NEUTROPHILS # BLD AUTO: 10.94 K/UL — HIGH (ref 1.8–7.4)
NEUTROPHILS NFR BLD AUTO: 77 % — SIGNIFICANT CHANGE UP (ref 43–77)
NITRITE UR-MCNC: NEGATIVE — SIGNIFICANT CHANGE UP
NRBC # BLD: 0 /100 WBCS — SIGNIFICANT CHANGE UP (ref 0–0)
NRBC # BLD: 0 /100 WBCS — SIGNIFICANT CHANGE UP (ref 0–0)
PH UR: 5 — SIGNIFICANT CHANGE UP (ref 5–8)
PLATELET # BLD AUTO: 115 K/UL — LOW (ref 150–400)
PLATELET # BLD AUTO: 123 K/UL — LOW (ref 150–400)
POTASSIUM SERPL-MCNC: 3.9 MMOL/L — SIGNIFICANT CHANGE UP (ref 3.5–5.3)
POTASSIUM SERPL-MCNC: 4.2 MMOL/L — SIGNIFICANT CHANGE UP (ref 3.5–5.3)
POTASSIUM SERPL-SCNC: 3.9 MMOL/L — SIGNIFICANT CHANGE UP (ref 3.5–5.3)
POTASSIUM SERPL-SCNC: 4.2 MMOL/L — SIGNIFICANT CHANGE UP (ref 3.5–5.3)
PROT SERPL-MCNC: 5.6 G/DL — LOW (ref 6–8.3)
PROT UR-MCNC: NEGATIVE MG/DL — SIGNIFICANT CHANGE UP
RBC # BLD: 2.58 M/UL — LOW (ref 4.2–5.8)
RBC # BLD: 2.75 M/UL — LOW (ref 4.2–5.8)
RBC # FLD: 13.7 % — SIGNIFICANT CHANGE UP (ref 10.3–14.5)
RBC # FLD: 13.9 % — SIGNIFICANT CHANGE UP (ref 10.3–14.5)
SODIUM SERPL-SCNC: 139 MMOL/L — SIGNIFICANT CHANGE UP (ref 135–145)
SODIUM SERPL-SCNC: 141 MMOL/L — SIGNIFICANT CHANGE UP (ref 135–145)
SP GR SPEC: 1.02 — SIGNIFICANT CHANGE UP (ref 1.01–1.02)
UROBILINOGEN FLD QL: NEGATIVE MG/DL — SIGNIFICANT CHANGE UP
WBC # BLD: 14.22 K/UL — HIGH (ref 3.8–10.5)
WBC # BLD: 16.62 K/UL — HIGH (ref 3.8–10.5)
WBC # FLD AUTO: 14.22 K/UL — HIGH (ref 3.8–10.5)
WBC # FLD AUTO: 16.62 K/UL — HIGH (ref 3.8–10.5)

## 2022-06-18 PROCEDURE — 99233 SBSQ HOSP IP/OBS HIGH 50: CPT

## 2022-06-18 PROCEDURE — 27245 TREAT THIGH FRACTURE: CPT | Mod: LT

## 2022-06-18 DEVICE — IMPLANTABLE DEVICE: Type: IMPLANTABLE DEVICE | Site: LEFT | Status: FUNCTIONAL

## 2022-06-18 DEVICE — SCREW LAG GAMMA 10.5X105MM: Type: IMPLANTABLE DEVICE | Site: LEFT | Status: FUNCTIONAL

## 2022-06-18 DEVICE — SCREW LOCK FULLY THREADED 5X37.5MM: Type: IMPLANTABLE DEVICE | Site: LEFT | Status: FUNCTIONAL

## 2022-06-18 DEVICE — KWIRE 3.2X450MM: Type: IMPLANTABLE DEVICE | Site: LEFT | Status: FUNCTIONAL

## 2022-06-18 RX ORDER — SODIUM CHLORIDE 9 MG/ML
1000 INJECTION, SOLUTION INTRAVENOUS
Refills: 0 | Status: DISCONTINUED | OUTPATIENT
Start: 2022-06-18 | End: 2022-06-18

## 2022-06-18 RX ORDER — SODIUM CHLORIDE 9 MG/ML
1000 INJECTION, SOLUTION INTRAVENOUS
Refills: 0 | Status: DISCONTINUED | OUTPATIENT
Start: 2022-06-18 | End: 2022-06-21

## 2022-06-18 RX ORDER — CEFAZOLIN SODIUM 1 G
2000 VIAL (EA) INJECTION EVERY 8 HOURS
Refills: 0 | Status: COMPLETED | OUTPATIENT
Start: 2022-06-18 | End: 2022-06-19

## 2022-06-18 RX ORDER — HYDROMORPHONE HYDROCHLORIDE 2 MG/ML
0.5 INJECTION INTRAMUSCULAR; INTRAVENOUS; SUBCUTANEOUS
Refills: 0 | Status: DISCONTINUED | OUTPATIENT
Start: 2022-06-18 | End: 2022-06-18

## 2022-06-18 RX ORDER — ENOXAPARIN SODIUM 100 MG/ML
40 INJECTION SUBCUTANEOUS EVERY 24 HOURS
Refills: 0 | Status: DISCONTINUED | OUTPATIENT
Start: 2022-06-19 | End: 2022-06-21

## 2022-06-18 RX ORDER — MAGNESIUM HYDROXIDE 400 MG/1
30 TABLET, CHEWABLE ORAL DAILY
Refills: 0 | Status: DISCONTINUED | OUTPATIENT
Start: 2022-06-18 | End: 2022-06-21

## 2022-06-18 RX ORDER — ACETAMINOPHEN 500 MG
1000 TABLET ORAL EVERY 6 HOURS
Refills: 0 | Status: DISCONTINUED | OUTPATIENT
Start: 2022-06-18 | End: 2022-06-21

## 2022-06-18 RX ORDER — OXYCODONE HYDROCHLORIDE 5 MG/1
10 TABLET ORAL
Refills: 0 | Status: DISCONTINUED | OUTPATIENT
Start: 2022-06-18 | End: 2022-06-21

## 2022-06-18 RX ORDER — ONDANSETRON 8 MG/1
4 TABLET, FILM COATED ORAL ONCE
Refills: 0 | Status: DISCONTINUED | OUTPATIENT
Start: 2022-06-18 | End: 2022-06-18

## 2022-06-18 RX ORDER — FAMOTIDINE 10 MG/ML
20 INJECTION INTRAVENOUS EVERY 12 HOURS
Refills: 0 | Status: DISCONTINUED | OUTPATIENT
Start: 2022-06-18 | End: 2022-06-18

## 2022-06-18 RX ORDER — OXYCODONE HYDROCHLORIDE 5 MG/1
5 TABLET ORAL
Refills: 0 | Status: DISCONTINUED | OUTPATIENT
Start: 2022-06-18 | End: 2022-06-21

## 2022-06-18 RX ADMIN — SODIUM CHLORIDE 100 MILLILITER(S): 9 INJECTION, SOLUTION INTRAVENOUS at 09:10

## 2022-06-18 RX ADMIN — HYDROMORPHONE HYDROCHLORIDE 0.5 MILLIGRAM(S): 2 INJECTION INTRAMUSCULAR; INTRAVENOUS; SUBCUTANEOUS at 16:31

## 2022-06-18 RX ADMIN — Medication 1000 MILLIGRAM(S): at 23:30

## 2022-06-18 RX ADMIN — Medication 1 GRAM(S): at 23:30

## 2022-06-18 RX ADMIN — TAMSULOSIN HYDROCHLORIDE 0.4 MILLIGRAM(S): 0.4 CAPSULE ORAL at 21:14

## 2022-06-18 RX ADMIN — MORPHINE SULFATE 2 MILLIGRAM(S): 50 CAPSULE, EXTENDED RELEASE ORAL at 02:40

## 2022-06-18 RX ADMIN — Medication 1 GRAM(S): at 05:49

## 2022-06-18 RX ADMIN — MORPHINE SULFATE 2 MILLIGRAM(S): 50 CAPSULE, EXTENDED RELEASE ORAL at 09:13

## 2022-06-18 RX ADMIN — Medication 650 MILLIGRAM(S): at 17:50

## 2022-06-18 RX ADMIN — SODIUM CHLORIDE 75 MILLILITER(S): 9 INJECTION, SOLUTION INTRAVENOUS at 15:27

## 2022-06-18 RX ADMIN — PANTOPRAZOLE SODIUM 40 MILLIGRAM(S): 20 TABLET, DELAYED RELEASE ORAL at 17:50

## 2022-06-18 RX ADMIN — Medication 1 GRAM(S): at 17:51

## 2022-06-18 RX ADMIN — Medication 650 MILLIGRAM(S): at 18:32

## 2022-06-18 RX ADMIN — ATORVASTATIN CALCIUM 20 MILLIGRAM(S): 80 TABLET, FILM COATED ORAL at 21:14

## 2022-06-18 RX ADMIN — MORPHINE SULFATE 2 MILLIGRAM(S): 50 CAPSULE, EXTENDED RELEASE ORAL at 02:23

## 2022-06-18 RX ADMIN — Medication 100 MILLIGRAM(S): at 18:36

## 2022-06-18 RX ADMIN — Medication 3 MILLIGRAM(S): at 21:13

## 2022-06-18 RX ADMIN — MORPHINE SULFATE 2 MILLIGRAM(S): 50 CAPSULE, EXTENDED RELEASE ORAL at 09:30

## 2022-06-18 RX ADMIN — PANTOPRAZOLE SODIUM 40 MILLIGRAM(S): 20 TABLET, DELAYED RELEASE ORAL at 05:48

## 2022-06-18 RX ADMIN — HYDROMORPHONE HYDROCHLORIDE 0.5 MILLIGRAM(S): 2 INJECTION INTRAMUSCULAR; INTRAVENOUS; SUBCUTANEOUS at 16:50

## 2022-06-18 NOTE — DIETITIAN INITIAL EVALUATION ADULT - NSFNSPHYEXAMSKINFT_GEN_A_CORE
Pressure Injury 1: Left:,buttocks, Unstageable  Pressure Injury 2: none, none  Pressure Injury 3: none, none  Pressure Injury 4: none, none  Pressure Injury 5: none, none  Pressure Injury 6: none, none  Pressure Injury 7: none, none  Pressure Injury 8: none, none  Pressure Injury 9: none, none  Pressure Injury 10: none, none  Pressure Injury 11: none, none

## 2022-06-18 NOTE — DIETITIAN INITIAL EVALUATION ADULT - PERTINENT MEDS FT
MEDICATIONS  (STANDING):  atorvastatin 20 milliGRAM(s) Oral at bedtime  lactated ringers. 1000 milliLiter(s) (100 mL/Hr) IV Continuous <Continuous>  pantoprazole    Tablet 40 milliGRAM(s) Oral every 12 hours  senna 2 Tablet(s) Oral at bedtime  sucralfate suspension 1 Gram(s) Oral every 6 hours  tamsulosin 0.4 milliGRAM(s) Oral at bedtime    MEDICATIONS  (PRN):  acetaminophen     Tablet .. 650 milliGRAM(s) Oral every 6 hours PRN Temp greater or equal to 38C (100.4F), Mild Pain (1 - 3)  HYDROmorphone  Injectable 0.5 milliGRAM(s) IV Push every 6 hours PRN Severe Pain (7 - 10)  melatonin 3 milliGRAM(s) Oral at bedtime PRN Insomnia  morphine  - Injectable 2 milliGRAM(s) IV Push every 6 hours PRN Moderate Pain (4 - 6)  ondansetron Injectable 4 milliGRAM(s) IV Push every 6 hours PRN Nausea and/or Vomiting  polyethylene glycol 3350 17 Gram(s) Oral daily PRN Constipation

## 2022-06-18 NOTE — PROGRESS NOTE ADULT - ASSESSMENT
88 year old male with PMHx CAD s/p CABG x4, HTN, HLD, BPH, presents to the ED after fall at home yesterday afternoon. Patient was found on the floor in the kitchen, unwitnessed fall, and had landed on his left side    cad  cabg  heme occult positive  BPH  HTN  HLD  eval for GI bleed    NEURO and GI eval noted  VS noted  serial H and H  on IVF      ortho eval  cardio eval  cardiac catheterization July 20, 2021 demonstrating patent LIMA to the LAD, SVG to RCA, SVG to diagonal and a closed SVG to OM.  EF 45%. No stenting was performed  GI eval - GI bleed eval   cvs rx regimen  serial H and H  monitor VS and HD and Sat  I yuri  dvt p - seq teds  cyn op optimization and management in progress - plan for eventual OR with ORTHO

## 2022-06-18 NOTE — DIETITIAN INITIAL EVALUATION ADULT - PERTINENT LABORATORY DATA
06-18    141  |  105  |  20  ----------------------------<  115<H>  3.9   |  30  |  0.88    Ca    8.6      18 Jun 2022 06:56    TPro  5.6<L>  /  Alb  3.1<L>  /  TBili  0.9  /  DBili  x   /  AST  16  /  ALT  27  /  AlkPhos  53  06-18  A1C with Estimated Average Glucose Result: 5.7 % (06-17-22 @ 08:58)

## 2022-06-18 NOTE — PROGRESS NOTE ADULT - ASSESSMENT
88 year old male with PMHx CAD s/p CABG x4, HTN, HLD, BPH, presented to the ED after fall at home yesterday afternoon admitted for left hip fracture with planned OR with orthopedic surgery.     Fracture of left hip.   - Xray L femur and hip: suspicious for fracture  - continue pain medication prn   - Fall precautions  - PT eval post op  - Cardio input appreciated.   - Patient currently medically cleared for procedure as per GI and cardiology workup    Emesis   -Patient was nauseous en route from [facility] and noted to be coughing up coffee ground emesis  -Check STAT H/H and Q6, called and sent message to GI on call  - PPI, Carafate    Syncope.   Patient reportedly passed out in his recliner after the fall while in extreme pain as per wife   - likely vagal mediated syncope   - regained consciousness after ~1 minute   - patient has no residual deficits   - TSH WNL- Neurology consulted Dr. Nevarez     Leukocytosis.   WBC 16.17 on admission likely reactive  - Afebrile, appears non toxic  - Monitor off antibiotics at this time  - F/u AM CBC.  - Surveillance blood cultures ordered     Anemia.   H/H 11.0/33.7 on admission, baseline hemoglobin unknown  - Currently hemodynamically stable, monitor for signs and symptoms of active bleeding  - Maintain active type and screen  - F/u iron panel: iron, ferritin, TIBC, transferrin  - F/u folate, vitamin B12  - F/u AM CBC.    CAD (coronary artery disease).   s/p CABG x4 2003  - Hold Aspirin  - continue and statin  - stable, no signs or sx of ACS  - cardiology following    Hypertension.   - Metoprolol, Losartan   - Monitor routine hemodynamics.    Hyperlipidemia.    Continue home Atorvastatin 20 mg PO qhs and ASA 81 mg PO qd.    BPH (benign prostatic hyperplasia).   Continue home Tamsulosin 0.4 mg PO qd.    Need for prophylactic measure.   SCD's for now    IMPROVE VTE Individual Risk Assessment          RISK                                                          Points  [  ] Previous VTE                                                3  [  ] Thrombophilia                                             2  [  ] Lower limb paralysis                                   2        (unable to hold up >15 seconds)    [  ] Current Cancer                                             2         (within 6 months)  [  ] Immobilization > 24 hrs                              1  [  ] ICU/CCU stay > 24 hours                             1  [ x ] Age > 60                                                         1    IMPROVE VTE Score: 1.   88 year old male with PMHx CAD s/p CABG x4, HTN, HLD, BPH, presented to the ED after fall at home yesterday afternoon admitted for left hip fracture with planned OR with orthopedic surgery.     Fracture of left hip.   - Xray L femur and hip: suspicious for fracture  - continue pain medication prn   - Fall precautions  - PT eval for post op  - Cardiology consulted for medical clearance for surgery, no absolute contraindications and should proceed.  - Patient currently medically cleared for procedure as per GI and cardiology workup    Emesis   -Patient was noted to be coughing up coffee ground emesis en route from [facility]  -GI consulted, appreciate reccs  -trend CBC  -PPI, Carafate    Syncope.   Patient reportedly passed out in his recliner after the fall while in extreme pain as per wife   - likely vagal mediated syncope   - regained consciousness after ~1 minute   - patient has no residual deficits   - TSH WNL- Neurology consulted Dr. Nevarez   - Echo findings appear unrelated to episode    Leukocytosis.   WBC 16.17 on admission likely reactive, downtrending  - Afebrile, appears non toxic  - Monitor off antibiotics at this time  - Surveillance blood cultures pending    Anemia.   H/H 11.0/33.7 on admission, baseline hemoglobin unknown  - Downtrending Hb but currently hemodynamically stable, monitor for signs and symptoms of active bleeding  - Maintain active type and screen  - F/u iron panel: iron, ferritin, TIBC, transferrin  - Folate, vitamin B12 WNL  - Trend CBC.    CAD (coronary artery disease).   s/p CABG x4 2003  - Hold Aspirin  - continue and statin  - stable, no signs or sx of ACS  - cardiology following    Hypertension.   - continue Metoprolol, Losartan   - Monitor routine hemodynamics.    Hyperlipidemia.    Continue home Atorvastatin 20 mg PO qhs and ASA 81 mg PO qd.    BPH (benign prostatic hyperplasia).   Continue home Tamsulosin 0.4 mg PO qd.    Need for prophylactic measure.   SCD's for now    IMPROVE VTE Individual Risk Assessment          RISK                                                          Points  [  ] Previous VTE                                                3  [  ] Thrombophilia                                             2  [  ] Lower limb paralysis                                   2        (unable to hold up >15 seconds)    [  ] Current Cancer                                             2         (within 6 months)  [  ] Immobilization > 24 hrs                              1  [  ] ICU/CCU stay > 24 hours                             1  [ x ] Age > 60                                                         1    IMPROVE VTE Score: 1.

## 2022-06-18 NOTE — PROGRESS NOTE ADULT - ASSESSMENT
Pt is seen for Fall  -was mechanical fall.   Didn't hit head.   After the fall pt had Brief LOC  No seizure activity is reported.  Non focal exam except left LE   -Has Left Hip Fx.  No signs of CVA.  Clinically volume depleted.   Likely had Vasovagal syncope.  Ortho follows  - S/p Left Hip Sx   Pain meds.  PT upon ortho clearance  Fall/safety precautions.  D/w Pt. Questions answered.  Would continue to follow.

## 2022-06-18 NOTE — DIETITIAN INITIAL EVALUATION ADULT - NS FNS DIET ORDER
Diet, NPO after Midnight:      NPO Start Date: 17-Jun-2022,   NPO Start Time: 23:59  Except Medications (06-17-22 @ 16:44)  Diet, Easy to Chew:   DASH/TLC {Sodium & Cholesterol Restricted} (06-17-22 @ 16:44)

## 2022-06-18 NOTE — DIETITIAN INITIAL EVALUATION ADULT - NUTRITIONGOAL OUTCOME1
Once diet is initiated, pt to tolerate diet consistency w/ >50-75% of most meals/fortified snacks; improved skin integrity.

## 2022-06-18 NOTE — BRIEF OPERATIVE NOTE - NSICDXBRIEFPROCEDURE_GEN_ALL_CORE_FT
PROCEDURES:  Open reduction and internal fixation (ORIF) of left hip using trochanteric shannon 18-Jun-2022 14:51:23  Terry Hinton

## 2022-06-18 NOTE — PROGRESS NOTE ADULT - ASSESSMENT
89M p/w L IT fx    Discussed with patient non operative and operative treatments for his fracture. Given his community ambulator status previously we have elected to proceed with ORIF. R B A explained to patient including but not limited to bleeding, infection, damage to surrounding structures, need for future surgeries, MI, Stroke, Malunion, nonunion, loss of life or limb. The patient understands these risks. All questions answered.

## 2022-06-18 NOTE — DIETITIAN INITIAL EVALUATION ADULT - ORAL INTAKE PTA/DIET HISTORY
PTA pt reports good appetite, typically consumes 3 balanced meals/day prepared by his wife and daughter (B- cereal, eggs, L- sandwich, soup, D- protein, potato, vegetable).

## 2022-06-18 NOTE — PROGRESS NOTE ADULT - SUBJECTIVE AND OBJECTIVE BOX
Patient is a 88y old  Male who presents with a chief complaint of ? esophgagitis (17 Jun 2022 16:25)    HPI: 88 year old male with PMHx CAD s/p CABG x4, HTN, HLD, BPH, presents to the ED after fall at home yesterday afternoon. Patient was found on the floor in the kitchen, unwitnessed fall, and had landed on his left side. After the fall family helped him up an moved him to the recliner where he laid down in extreme pain. He then passed out from the pain as per wife for about 1 minute. Patient regained consciousness and was brought to the ED. Patient denies memory of passing out. He also denies fever, chills, cp, sob, abd pain, n/v/d. Patient seen by cardiology and transferred to [facility] for further orthopedic workup and management. Per patient, he was nauseous en route from [facility] to ED and was coughing up dark Emesis. He has no hx of GI bleed and denies any hx of same. Patient has been on ASA long term for hx of CAD.  No seizure reported.  Found to have left Hip Fx.    Interval History  -    S/p Left Hip Sx    MEDICATIONS  (STANDING):    acetaminophen     Tablet .. 1000 milliGRAM(s) Oral every 6 hours  atorvastatin 20 milliGRAM(s) Oral at bedtime  ceFAZolin   IVPB 2000 milliGRAM(s) IV Intermittent every 8 hours  famotidine    Tablet 20 milliGRAM(s) Oral every 12 hours  lactated ringers. 1000 milliLiter(s) (100 mL/Hr) IV Continuous <Continuous>  lactated ringers. 1000 milliLiter(s) (100 mL/Hr) IV Continuous <Continuous>  pantoprazole    Tablet 40 milliGRAM(s) Oral every 12 hours  senna 2 Tablet(s) Oral at bedtime  sucralfate suspension 1 Gram(s) Oral every 6 hours  tamsulosin 0.4 milliGRAM(s) Oral at bedtime    MEDICATIONS  (PRN):    acetaminophen     Tablet .. 650 milliGRAM(s) Oral every 6 hours PRN Temp greater or equal to 38C (100.4F), Mild Pain (1 - 3)  aluminum hydroxide/magnesium hydroxide/simethicone Suspension 30 milliLiter(s) Oral four times a day PRN Indigestion  HYDROmorphone  Injectable 0.5 milliGRAM(s) IV Push every 6 hours PRN Severe Pain (7 - 10)  HYDROmorphone  IVPB 0.5 milliGRAM(s) IV Intermittent every 3 hours PRN Severe Pain (7 - 10)  magnesium hydroxide Suspension 30 milliLiter(s) Oral daily PRN Constipation  melatonin 3 milliGRAM(s) Oral at bedtime PRN Insomnia  morphine  - Injectable 2 milliGRAM(s) IV Push every 6 hours PRN Moderate Pain (4 - 6)  ondansetron Injectable 4 milliGRAM(s) IV Push every 6 hours PRN Nausea and/or Vomiting  oxyCODONE    IR 10 milliGRAM(s) Oral every 3 hours PRN Moderate Pain (4 - 6)  oxyCODONE    IR 5 milliGRAM(s) Oral every 3 hours PRN Mild Pain (1 - 3)  polyethylene glycol 3350 17 Gram(s) Oral daily PRN Constipation    Allergies    No Known Allergies    REVIEW OF SYSTEMS:    CONSTITUTIONAL: No fever  EYES: No eye pain,   ENMT:  No sinus or throat pain  NECK: No pain or stiffness  RESPIRATORY: No cough, No hemoptysis; No shortness of breath  CARDIOVASCULAR: No acute chest pain, palpitations,  or leg swelling  GASTROINTESTINAL: No abdominal pain. No nausea, vomiting, or hematemesis;  GENITOURINARY: No  hematuria, or incontinence  MUSCULOSKELETAL: S/p Left Hip Sx  SKIN: No itching, rashes, or lesions   LYMPH NODES: No enlarged glands  NEUROLOGICAL: No headaches, memory loss,   PSYCHIATRIC: No depression, anxiety, mood swings  ENDOCRINE: No heat or cold intolerance;   HEME/LYMPH: No easy bruising, or bleeding gums  Allergy/Immunology. No medication allergy. No seasonal allergies.    PHYSICAL EXAM:  Vital Signs Last 24 Hrs  T(F): 97.6 (06-17-22 @ 13:03)  HR: 95 (06-17-22 @ 13:03)  BP: 103/62 (06-17-22 @ 13:03)  RR: 19 (06-17-22 @ 13:03)    GENERAL: NAD, well-groomed, well-developed  HEAD:  Atraumatic, Normocephalic  EYES: EOMI, PERRLA, conjunctiva and sclera clear  NECK: Supple, No JVD    On Neurological Examination:    Mental Status - Pt is alert, awake, oriented X3. Higher functions are intact. Follows commands well and able to answer questions appropriately.    Speech -  Normal. Pt has no aphasia.    Cranial Nerves - Pupils 3 mm equal and reactive to light, extraocular eye movements intact. Pt has no visual field deficit.  No facial asymmetry. Tongue - is in midline.    Motor Exam - 4 plus/5 all over, except left LE where he has Left Hip Fx.  S/p Left Hip Sx    Sensory Exam - Pt withdraws all extremities equally on stimulation.    Gait - Not tested.   S/p Left Hip Sx    Deep tendon Reflexes - 2 plus all over.    Coordination - Fine finger movements are normal on both sides. Finger to nose is also normal on both sides.       Neck Supple -  Yes.    LABS:    CBC Full  -  ( 18 Jun 2022 06:56 )  WBC Count : 14.22 K/uL  RBC Count : 2.75 M/uL  Hemoglobin : 9.4 g/dL  Hematocrit : 29.0 %  Platelet Count - Automated : 123 K/uL  Mean Cell Volume : 105.5 fl  Mean Cell Hemoglobin : 34.2 pg  Mean Cell Hemoglobin Concentration : 32.4 gm/dL  Auto Neutrophil # : 10.94 K/uL  Auto Lymphocyte # : 1.16 K/uL  Auto Monocyte # : 2.04 K/uL  Auto Eosinophil # : 0.02 K/uL  Auto Basophil # : 0.02 K/uL  Auto Neutrophil % : 77.0 %  Auto Lymphocyte % : 8.2 %  Auto Monocyte % : 14.3 %  Auto Eosinophil % : 0.1 %  Auto Basophil % : 0.1 %    06-18    141  |  105  |  20  ----------------------------<  115<H>  3.9   |  30  |  0.88    Ca    8.6      18 Jun 2022 06:56    TPro  5.6<L>  /  Alb  3.1<L>  /  TBili  0.9  /  DBili  x   /  AST  16  /  ALT  27  /  AlkPhos  53  06-18    PTT - ( 16 Jun 2022 23:45 )  PTT:26.1 sec    RADIOLOGY & ADDITIONAL STUDIES:    < from: CT Hip No Cont, Left (06.17.22 @ 08:11) >    Comminuted left intertrochanteric fracture.    < end of copied text >

## 2022-06-18 NOTE — PROGRESS NOTE ADULT - SUBJECTIVE AND OBJECTIVE BOX
89M p/w L hip pain s/p fall friday night. Unable to ambulate afterwards. Denies pain in his other extremities or distally. Honorio numbness or tingling. Used no assistive devices previously.    O  LLE: short ext rotated, skin intact, pain with log roll  motor intact ehl fhl ta g  silt sp dp s s pt  toes wwp bcr

## 2022-06-18 NOTE — DIETITIAN INITIAL EVALUATION ADULT - OTHER INFO
Per H&P, pt is a "88 year old male with PMHx CAD s/p CABG x4, HTN, HLD, BPH, presents to the ED after fall at home yesterday afternoon. Patient was found on the floor in the kitchen, unwitnessed fall, and had landed on his left side. After the fall family helped him up an moved him to the recliner where he laid down in extreme pain. He then passed out from the pain as per wife for about 1 minute. Patient regained consciousness and was brought to the ED. Patient denies memory of passing out. He also denies fever, chills, cp, sob, abd pain, n/v/d. Patient seen by cardiology and transferred to [facility] for further orthopedic workup and management. Per patient, he was nauseous en route from [facility] to ED and was coughing up dark Emesis. He has no hx of GI bleed and denies any hx of same. Patient has been on ASA long term for hx of CAD."    Nutrition consult ordered for pressure ulcer stage II or >. Pt admitted with unstageable PI to L buttocks. Visited pt this afternoon, pt resting in bed during time of visit. Pt reports good appetite and intake at baseline. Pt currently NPO pending surgery for L hip fx. Pt reports feeling hungry during visit. Pt able to feed self, reports no chewing/swallowing difficulties. No food allergies. Pt with N/V yesterday on way to hospital (dark emesis). Reports only one episode of N/V. Denies N/V/D/C currently. Receiving carafate and PPI. Reports regular BMs at home. No admission ht/wt documented in EMR. Pt reports ht of 5'10" and wt of 212#; reports stable weight. No edema noted. Pt presently NPO, receiving IVFs; LR@100ml/hr. DASH/TLC, easy-to-chew currently active as well (will provide diet when medically feasible). Discussed importance of adequate protein intake for wound healing. Once po diet can be initiated, will provide magic cup fortified ice cream BID for addtl kcal/protein (290kcal, 9g protein per 4 oz cup). Recommend MVI and vitamin C supplementation as well to facilitate wound healing. Meal preferences to be obtained daily to optimize po intake and tolerance. RD to follow-up and will continue to monitor pt's nutritional status.

## 2022-06-18 NOTE — PROGRESS NOTE ADULT - SUBJECTIVE AND OBJECTIVE BOX
Date/Time Patient Seen:  		  Referring MD:   Data Reviewed	       Patient is a 88y old  Male who presents with a chief complaint of Fall/Syncope (17 Jun 2022 16:55)      Subjective/HPI     PAST MEDICAL & SURGICAL HISTORY:  CAD (coronary artery disease)    HTN (hypertension)    HLD (hyperlipidemia)    S/P CABG x 4    History of cholecystectomy          Medication list         MEDICATIONS  (STANDING):  atorvastatin 20 milliGRAM(s) Oral at bedtime  lactated ringers. 1000 milliLiter(s) (60 mL/Hr) IV Continuous <Continuous>  pantoprazole    Tablet 40 milliGRAM(s) Oral every 12 hours  senna 2 Tablet(s) Oral at bedtime  sucralfate suspension 1 Gram(s) Oral every 6 hours  tamsulosin 0.4 milliGRAM(s) Oral at bedtime    MEDICATIONS  (PRN):  acetaminophen     Tablet .. 650 milliGRAM(s) Oral every 6 hours PRN Temp greater or equal to 38C (100.4F), Mild Pain (1 - 3)  HYDROmorphone  Injectable 0.5 milliGRAM(s) IV Push every 6 hours PRN Severe Pain (7 - 10)  melatonin 3 milliGRAM(s) Oral at bedtime PRN Insomnia  morphine  - Injectable 2 milliGRAM(s) IV Push every 6 hours PRN Moderate Pain (4 - 6)  ondansetron Injectable 4 milliGRAM(s) IV Push every 6 hours PRN Nausea and/or Vomiting  polyethylene glycol 3350 17 Gram(s) Oral daily PRN Constipation         Vitals log        ICU Vital Signs Last 24 Hrs  T(C): 37.1 (17 Jun 2022 23:00), Max: 37.1 (17 Jun 2022 23:00)  T(F): 98.8 (17 Jun 2022 23:00), Max: 98.8 (17 Jun 2022 23:00)  HR: 96 (17 Jun 2022 23:00) (69 - 96)  BP: 91/61 (17 Jun 2022 23:00) (91/61 - 132/71)  BP(mean): --  ABP: --  ABP(mean): --  RR: 16 (17 Jun 2022 23:00) (16 - 19)  SpO2: 95% (17 Jun 2022 23:00) (95% - 96%)           Input and Output:  I&O's Detail    17 Jun 2022 07:01 - 18 Jun 2022 06:55  --------------------------------------------------------  IN:    Lactated Ringers: 780 mL  Total IN: 780 mL    OUT:  Total OUT: 0 mL    Total NET: 780 mL          Lab Data                        10.3   x     )-----------( x        ( 17 Jun 2022 19:47 )             31.7     06-17    142  |  108  |  16  ----------------------------<  142<H>  4.7   |  29  |  0.86    Ca    8.7      17 Jun 2022 05:34    TPro  6.6  /  Alb  3.4  /  TBili  0.8  /  DBili  x   /  AST  24  /  ALT  34  /  AlkPhos  73  06-17      CARDIAC MARKERS ( 17 Jun 2022 05:34 )  x     / x     / 120 U/L / x     / x            Review of Systems	      Objective     Physical Examination    heart s1s2  lung dec BS  abd soft      Pertinent Lab findings & Imaging      Kyle:  NO   Adequate UO     I&O's Detail    17 Jun 2022 07:01  -  18 Jun 2022 06:55  --------------------------------------------------------  IN:    Lactated Ringers: 780 mL  Total IN: 780 mL    OUT:  Total OUT: 0 mL    Total NET: 780 mL               Discussed with:     Cultures:	        Radiology

## 2022-06-18 NOTE — DIETITIAN INITIAL EVALUATION ADULT - NS FNS DAYS NPO GEN ALL CORE
Pt AVS and follow up reviewed, pt expressed understanding, pt d/c at this time.       Vicenta Omalley RN  03/14/22 [id number]
Wound dressing applied by MD Ki Mckinney RN  03/14/22 [id number]
2

## 2022-06-18 NOTE — PROGRESS NOTE ADULT - SUBJECTIVE AND OBJECTIVE BOX
Patient is a 88y old  Male who presents with a chief complaint of Fall/Syncope (2022 16:55)      INTERVAL HPI/OVERNIGHT EVENTS:    MEDICATIONS  (STANDING):  atorvastatin 20 milliGRAM(s) Oral at bedtime  lactated ringers. 1000 milliLiter(s) (100 mL/Hr) IV Continuous <Continuous>  pantoprazole    Tablet 40 milliGRAM(s) Oral every 12 hours  senna 2 Tablet(s) Oral at bedtime  sucralfate suspension 1 Gram(s) Oral every 6 hours  tamsulosin 0.4 milliGRAM(s) Oral at bedtime    MEDICATIONS  (PRN):  acetaminophen     Tablet .. 650 milliGRAM(s) Oral every 6 hours PRN Temp greater or equal to 38C (100.4F), Mild Pain (1 - 3)  HYDROmorphone  Injectable 0.5 milliGRAM(s) IV Push every 6 hours PRN Severe Pain (7 - 10)  melatonin 3 milliGRAM(s) Oral at bedtime PRN Insomnia  morphine  - Injectable 2 milliGRAM(s) IV Push every 6 hours PRN Moderate Pain (4 - 6)  ondansetron Injectable 4 milliGRAM(s) IV Push every 6 hours PRN Nausea and/or Vomiting  polyethylene glycol 3350 17 Gram(s) Oral daily PRN Constipation      Allergies    No Known Allergies    Intolerances        REVIEW OF SYSTEMS:  CONSTITUTIONAL: No fever, weight loss, or fatigue  EYES: No eye pain, visual disturbances, or discharge  ENMT:  No difficulty hearing, tinnitus, vertigo; No sinus or throat pain  NECK: No pain or stiffness  RESPIRATORY: No cough, wheezing, chills or hemoptysis; No shortness of breath  CARDIOVASCULAR: No chest pain, palpitations, lightheadedness, or leg swelling  GASTROINTESTINAL: No abdominal or epigastric pain. No nausea, vomiting, or hematemesis; No diarrhea or constipation. No melena or hematochezia.  GENITOURINARY: No dysuria, frequency, hematuria, or incontinence  NEUROLOGICAL: No headaches, memory loss, vertigo, loss of strength, numbness, or tremors  SKIN: No itching, burning, rashes, or lesions   LYMPH NODES: No enlarged glands  ENDOCRINE: No heat or cold intolerance; No hair loss; No polydipsia or polyuria  MUSCULOSKELETAL: No joint pain or swelling; No muscle, back, or extremity pain  PSYCHIATRIC: No depression, anxiety, or mood swings  HEME/LYMPH: No easy bruising, or bleeding gums  ALLERGY AND IMMUNOLOGIC: No hives or eczema    PHYSICAL EXAM:  GENERAL: NAD, well-groomed, well-developed  HEAD:  Atraumatic, Normocephalic  EYES: EOMI, PERRLA, conjunctiva and sclera clear  ENMT: Moist mucous membranes, Good dentition, No lesions  NECK: Supple, No JVD appreciated  NERVOUS SYSTEM:  Alert & Oriented X3, Good concentration; All 4 extremities mobile, no gross sensory deficits.   CHEST/LUNG: Clear to auscultation bilaterally; No rales, rhonchi, wheezing, or rubs appreciated  HEART: Regular rate and rhythm; No murmurs, rubs, or gallops  ABDOMEN: Soft, Nontender, Nondistended; Bowel sounds present  EXTREMITIES:  2+ Peripheral Pulses, No clubbing, cyanosis, or edema  LYMPH: No lymphadenopathy noted  SKIN: No rashes or lesions    Vital Signs Last 24 Hrs  T(C): 36.4 (2022 08:22), Max: 37.1 (2022 23:00)  T(F): 97.6 (2022 08:22), Max: 98.8 (2022 23:00)  HR: 91 (2022 08:22) (69 - 96)  BP: 92/57 (2022 08:22) (91/61 - 131/69)  BP(mean): --  RR: 19 (2022 08:22) (16 - 19)  SpO2: 98% (2022 08:22) (95% - 98%)    LABS:                        9.4    14.22 )-----------( 123      ( 2022 06:56 )             29.0     2022 06:56    141    |  105    |  20     ----------------------------<  115    3.9     |  30     |  0.88     Ca    8.6        2022 06:56    TPro  5.6    /  Alb  3.1    /  TBili  0.9    /  DBili  x      /  AST  16     /  ALT  27     /  AlkPhos  53     2022 06:56    PT/INR - ( 2022 14:50 )   PT: 13.7 sec;   INR: 1.19 ratio         PTT - ( 2022 23:45 )  PTT:26.1 sec  Urinalysis Basic - ( 2022 06:05 )    Color: Yellow / Appearance: Clear / S.020 / pH: x  Gluc: x / Ketone: Negative  / Bili: Negative / Urobili: Negative mg/dL   Blood: x / Protein: Negative mg/dL / Nitrite: Negative   Leuk Esterase: Negative / RBC: x / WBC x   Sq Epi: x / Non Sq Epi: x / Bacteria: x      CAPILLARY BLOOD GLUCOSE          RADIOLOGY & ADDITIONAL TESTS:    Imaging Personally Reviewed:  [ ] YES     Consultant(s) Notes Reviewed:      Care Discussed with Consultants/Other Providers:    Advanced Directives: [ ] DNR  [ ] No feeding tube  [ ] MOLST in chart  [ ] MOLST completed today  [ ] Unknown   Patient is a 88y old  Male who presents with a chief complaint of Fall/Syncope (2022 16:55)      INTERVAL HPI/OVERNIGHT EVENTS:  Patient seen awake in bed. He reports some R shoulder pain, reports some leg muscle spasms. No reported hip pain at rest. Denies further vomiting. No reported overnight events.     MEDICATIONS  (STANDING):  atorvastatin 20 milliGRAM(s) Oral at bedtime  lactated ringers. 1000 milliLiter(s) (100 mL/Hr) IV Continuous <Continuous>  pantoprazole    Tablet 40 milliGRAM(s) Oral every 12 hours  senna 2 Tablet(s) Oral at bedtime  sucralfate suspension 1 Gram(s) Oral every 6 hours  tamsulosin 0.4 milliGRAM(s) Oral at bedtime    MEDICATIONS  (PRN):  acetaminophen     Tablet .. 650 milliGRAM(s) Oral every 6 hours PRN Temp greater or equal to 38C (100.4F), Mild Pain (1 - 3)  HYDROmorphone  Injectable 0.5 milliGRAM(s) IV Push every 6 hours PRN Severe Pain (7 - 10)  melatonin 3 milliGRAM(s) Oral at bedtime PRN Insomnia  morphine  - Injectable 2 milliGRAM(s) IV Push every 6 hours PRN Moderate Pain (4 - 6)  ondansetron Injectable 4 milliGRAM(s) IV Push every 6 hours PRN Nausea and/or Vomiting  polyethylene glycol 3350 17 Gram(s) Oral daily PRN Constipation      Allergies    No Known Allergies    Intolerances        REVIEW OF SYSTEMS:  CONSTITUTIONAL: No fever, weight loss, or fatigue  EYES: No eye pain, visual disturbances, or discharge  ENMT:  No difficulty hearing, tinnitus, vertigo; No sinus or throat pain  NECK: No pain or stiffness  RESPIRATORY: No cough, wheezing, chills or hemoptysis; No shortness of breath  CARDIOVASCULAR: No chest pain, palpitations, lightheadedness, or leg swelling  GASTROINTESTINAL: No abdominal or epigastric pain. No nausea, vomiting, or hematemesis; No diarrhea or constipation. No melena or hematochezia.  GENITOURINARY: No dysuria, frequency, hematuria, or incontinence  NEUROLOGICAL: No headaches, memory loss, vertigo, loss of strength, numbness, or tremors  SKIN: No itching, burning, rashes, or lesions   LYMPH NODES: No enlarged glands  ENDOCRINE: No heat or cold intolerance; No hair loss; No polydipsia or polyuria  MUSCULOSKELETAL: R shoulder pain, L leg pain, lower extremity muscle spasms. No joint swelling  PSYCHIATRIC: No depression, anxiety, or mood swings  HEME/LYMPH: No easy bruising, or bleeding gums  ALLERGY AND IMMUNOLOGIC: No hives or eczema    PHYSICAL EXAM:  GENERAL: NAD, well-groomed, well-developed  HEAD:  Atraumatic, Normocephalic  EYES: EOMI, PERRLA, conjunctiva and sclera clear  ENMT: Moist mucous membranes, Good dentition, No lesions  NECK: Supple, No JVD appreciated  NERVOUS SYSTEM:  Alert & Oriented X3, Good concentration; All 4 extremities mobile, no gross sensory deficits.   CHEST/LUNG: Clear to auscultation bilaterally; No rales, rhonchi, wheezing, or rubs appreciated  HEART: Regular rate and rhythm; No murmurs, rubs, or gallops  ABDOMEN: Soft, Nontender, Nondistended; Bowel sounds present  EXTREMITIES:  2+ Peripheral Pulses, No clubbing, cyanosis, or edema  LYMPH: No lymphadenopathy noted  SKIN: No rashes or lesions    Vital Signs Last 24 Hrs  T(C): 36.4 (2022 08:22), Max: 37.1 (2022 23:00)  T(F): 97.6 (2022 08:22), Max: 98.8 (2022 23:00)  HR: 91 (2022 08:22) (69 - 96)  BP: 92/57 (2022 08:22) (91/61 - 131/69)  BP(mean): --  RR: 19 (2022 08:22) (16 - 19)  SpO2: 98% (2022 08:22) (95% - 98%)    LABS:                        9.4    14.22 )-----------( 123      ( 2022 06:56 )             29.0     2022 06:56    141    |  105    |  20     ----------------------------<  115    3.9     |  30     |  0.88     Ca    8.6        2022 06:56    TPro  5.6    /  Alb  3.1    /  TBili  0.9    /  DBili  x      /  AST  16     /  ALT  27     /  AlkPhos  53     2022 06:56    PT/INR - ( 2022 14:50 )   PT: 13.7 sec;   INR: 1.19 ratio         PTT - ( 2022 23:45 )  PTT:26.1 sec  Urinalysis Basic - ( 2022 06:05 )    Color: Yellow / Appearance: Clear / S.020 / pH: x  Gluc: x / Ketone: Negative  / Bili: Negative / Urobili: Negative mg/dL   Blood: x / Protein: Negative mg/dL / Nitrite: Negative   Leuk Esterase: Negative / RBC: x / WBC x   Sq Epi: x / Non Sq Epi: x / Bacteria: x      CAPILLARY BLOOD GLUCOSE          RADIOLOGY & ADDITIONAL TESTS:    Imaging Personally Reviewed:  [ ] YES     Consultant(s) Notes Reviewed:      Care Discussed with Consultants/Other Providers:    Advanced Directives: [ ] DNR  [ ] No feeding tube  [ ] MOLST in chart  [ ] MOLST completed today  [ ] Unknown   Patient is a 88y old  Male who presents with a chief complaint of Fall/Syncope (2022 16:55)      INTERVAL HPI/OVERNIGHT EVENTS:  Patient seen awake in bed. He reports some R shoulder pain, reports some leg muscle spasms. No reported hip pain at rest. Denies further vomiting. No reported overnight events.     MEDICATIONS  (STANDING):  atorvastatin 20 milliGRAM(s) Oral at bedtime  lactated ringers. 1000 milliLiter(s) (100 mL/Hr) IV Continuous <Continuous>  pantoprazole    Tablet 40 milliGRAM(s) Oral every 12 hours  senna 2 Tablet(s) Oral at bedtime  sucralfate suspension 1 Gram(s) Oral every 6 hours  tamsulosin 0.4 milliGRAM(s) Oral at bedtime    MEDICATIONS  (PRN):  acetaminophen     Tablet .. 650 milliGRAM(s) Oral every 6 hours PRN Temp greater or equal to 38C (100.4F), Mild Pain (1 - 3)  HYDROmorphone  Injectable 0.5 milliGRAM(s) IV Push every 6 hours PRN Severe Pain (7 - 10)  melatonin 3 milliGRAM(s) Oral at bedtime PRN Insomnia  morphine  - Injectable 2 milliGRAM(s) IV Push every 6 hours PRN Moderate Pain (4 - 6)  ondansetron Injectable 4 milliGRAM(s) IV Push every 6 hours PRN Nausea and/or Vomiting  polyethylene glycol 3350 17 Gram(s) Oral daily PRN Constipation      Allergies    No Known Allergies    Intolerances        REVIEW OF SYSTEMS:  CONSTITUTIONAL: No fever, weight loss, or fatigue  EYES: No eye pain, visual disturbances, or discharge  RESPIRATORY: No cough, wheezing, chills or hemoptysis; No shortness of breath  CARDIOVASCULAR: No chest pain, palpitations, lightheadedness, or leg swelling  GASTROINTESTINAL: No abdominal or epigastric pain. No nausea, vomiting, or hematemesis; No diarrhea or constipation. No melena or hematochezia.  GENITOURINARY: No dysuria, frequency, hematuria, or incontinence  NEUROLOGICAL: No headaches, memory loss, vertigo, loss of strength, numbness, or tremors  SKIN: No itching, burning, rashes, or lesions   MUSCULOSKELETAL: R shoulder pain, L leg pain, lower extremity muscle spasms. No joint swelling  PSYCHIATRIC: No depression, anxiety, or mood swings  HEME/LYMPH: No easy bruising, or bleeding gums  ALLERGY AND IMMUNOLOGIC: No hives or eczema    PHYSICAL EXAM:  GENERAL: NAD, well-groomed, well-developed, laying in bed.  HEAD:  Atraumatic, Normocephalic  EYES: EOMI, PERRLA, conjunctiva and sclera clear  ENMT: Moist mucous membranes, Good dentition  NERVOUS SYSTEM:  Alert & Oriented X3, Good concentration; All 4 extremities mobile, no gross sensory deficits.   CHEST/LUNG: Clear to auscultation bilaterally; No rales, rhonchi, wheezing, or rubs appreciated  HEART: Regular rate and rhythm; No murmurs, rubs, or gallops  ABDOMEN: Soft, Nontender, Nondistended; Bowel sounds present  EXTREMITIES:  2+ Peripheral Pulses, No clubbing, cyanosis, or edema appreciated. Painful muscle spasms in lower extremities noted on movement.   LYMPH: No lymphadenopathy noted  SKIN: No rashes or lesions    Vital Signs Last 24 Hrs  T(C): 36.4 (2022 08:22), Max: 37.1 (2022 23:00)  T(F): 97.6 (2022 08:22), Max: 98.8 (2022 23:00)  HR: 91 (2022 08:22) (69 - 96)  BP: 92/57 (2022 08:22) (91/61 - 131/69)  BP(mean): --  RR: 19 (2022 08:22) (16 - 19)  SpO2: 98% (2022 08:22) (95% - 98%)    LABS:                        9.4    14.22 )-----------( 123      ( 2022 06:56 )             29.0     2022 06:56    141    |  105    |  20     ----------------------------<  115    3.9     |  30     |  0.88     Ca    8.6        2022 06:56    TPro  5.6    /  Alb  3.1    /  TBili  0.9    /  DBili  x      /  AST  16     /  ALT  27     /  AlkPhos  53     2022 06:56    PT/INR - ( 2022 14:50 )   PT: 13.7 sec;   INR: 1.19 ratio         PTT - ( 2022 23:45 )  PTT:26.1 sec  Urinalysis Basic - ( 2022 06:05 )    Color: Yellow / Appearance: Clear / S.020 / pH: x  Gluc: x / Ketone: Negative  / Bili: Negative / Urobili: Negative mg/dL   Blood: x / Protein: Negative mg/dL / Nitrite: Negative   Leuk Esterase: Negative / RBC: x / WBC x   Sq Epi: x / Non Sq Epi: x / Bacteria: x      CAPILLARY BLOOD GLUCOSE

## 2022-06-18 NOTE — CONSULT NOTE ADULT - SUBJECTIVE AND OBJECTIVE BOX
History of Present Illness: The patient is an 88 year old male with a history of HTN, HL, BPH, CAD s/p CABG, mild chronic systolic heart failure who presents with a fall. He states he was walking without a walker, lost his balance, and fell. No chest pain, shortness of breath, dizziness. Later in evening, due to pain, he had a brief episode of syncope. He presented to [facility] and was transferred to [facility] for surgery.    Past Medical/Surgical History:  HTN, HL, BPH, CAD s/p CABG, mild chronic systolic heart failure    Medications:  Home Medications:  acetaminophen 325 mg oral tablet: 2 tab(s) orally every 6 hours, As needed, Mild Pain (1 - 3) (17 Jun 2022 10:05)  Aspir 81 oral delayed release tablet: 1 tab(s) orally once a day (17 Jun 2022 03:51)  atorvastatin 20 mg oral tablet: 1 tab(s) orally once a day (17 Jun 2022 03:51)  hydroCHLOROthiazide 12.5 mg oral tablet: 1 tab(s) orally every other day (17 Jun 2022 03:51)  HYDROmorphone: 0.5 milligram(s) injectable every 4 hours, As Needed for severe pain (17 Jun 2022 10:05)  losartan 50 mg oral tablet: 1 tab(s) orally once a day (17 Jun 2022 03:51)  Metoprolol Succinate ER 25 mg oral tablet, extended release: 1 tab(s) orally once a day (17 Jun 2022 03:51)  morphine: 2 milligram(s) injectable every 4 hours, As Needed moderate pain (17 Jun 2022 10:05)  polyethylene glycol 3350 oral powder for reconstitution: 17 gram(s) orally once a day, As needed, Constipation (17 Jun 2022 10:05)  senna oral tablet: 2 tab(s) orally once a day (at bedtime) (17 Jun 2022 10:05)  tamsulosin 0.4 mg oral capsule: 1 cap(s) orally once a day (17 Jun 2022 03:51)      Family History: Non-contributory family history of premature cardiovascular atherosclerotic disease    Social History: No tobacco, alcohol or drug use    Review of Systems:  General: No fevers, chills, weight gain  Skin: No rashes, color changes  Cardiovascular: No chest pain, orthopnea  Respiratory: No shortness of breath, cough  Gastrointestinal: No nausea, abdominal pain  Genitourinary: No incontinence, pain with urination  Musculoskeletal: No pain, swelling, decreased range of motion  Neurological: No headache, weakness  Psychiatric: No depression, anxiety  Endocrine: No weight gain, increased thirst  All other systems are comprehensively negative.    Physical Exam:  Vitals:        Vital Signs Last 24 Hrs  T(C): 37.1 (17 Jun 2022 23:00), Max: 37.1 (17 Jun 2022 23:00)  T(F): 98.8 (17 Jun 2022 23:00), Max: 98.8 (17 Jun 2022 23:00)  HR: 96 (17 Jun 2022 23:00) (69 - 96)  BP: 91/61 (17 Jun 2022 23:00) (91/61 - 132/71)  BP(mean): --  RR: 16 (17 Jun 2022 23:00) (16 - 19)  SpO2: 95% (17 Jun 2022 23:00) (95% - 96%)  General: NAD  HEENT: MMM  Neck: No JVD, no carotid bruit  Lungs: CTAB  CV: RRR, nl S1/S2, no M/R/G  Abdomen: S/NT/ND, +BS  Extremities: No LE edema, no cyanosis  Neuro: AAOx3, non-focal  Skin: No rash    Labs:                        9.4    14.22 )-----------( 123      ( 18 Jun 2022 06:56 )             29.0     06-18    141  |  105  |  20  ----------------------------<  115<H>  3.9   |  30  |  0.88    Ca    8.6      18 Jun 2022 06:56    TPro  5.6<L>  /  Alb  3.1<L>  /  TBili  0.9  /  DBili  x   /  AST  16  /  ALT  27  /  AlkPhos  53  06-18    CARDIAC MARKERS ( 17 Jun 2022 05:34 )  x     / x     / 120 U/L / x     / x          PT/INR - ( 17 Jun 2022 14:50 )   PT: 13.7 sec;   INR: 1.19 ratio         PTT - ( 16 Jun 2022 23:45 )  PTT:26.1 sec    ECG: NSR, LAFB, RBBB

## 2022-06-19 LAB
ANION GAP SERPL CALC-SCNC: 5 MMOL/L — SIGNIFICANT CHANGE UP (ref 5–17)
BASOPHILS # BLD AUTO: 0.01 K/UL — SIGNIFICANT CHANGE UP (ref 0–0.2)
BASOPHILS NFR BLD AUTO: 0.1 % — SIGNIFICANT CHANGE UP (ref 0–2)
BUN SERPL-MCNC: 19 MG/DL — SIGNIFICANT CHANGE UP (ref 7–23)
CALCIUM SERPL-MCNC: 8.1 MG/DL — LOW (ref 8.4–10.5)
CHLORIDE SERPL-SCNC: 105 MMOL/L — SIGNIFICANT CHANGE UP (ref 96–108)
CO2 SERPL-SCNC: 28 MMOL/L — SIGNIFICANT CHANGE UP (ref 22–31)
CREAT SERPL-MCNC: 0.86 MG/DL — SIGNIFICANT CHANGE UP (ref 0.5–1.3)
CULTURE RESULTS: SIGNIFICANT CHANGE UP
EGFR: 83 ML/MIN/1.73M2 — SIGNIFICANT CHANGE UP
EOSINOPHIL # BLD AUTO: 0 K/UL — SIGNIFICANT CHANGE UP (ref 0–0.5)
EOSINOPHIL NFR BLD AUTO: 0 % — SIGNIFICANT CHANGE UP (ref 0–6)
GLUCOSE SERPL-MCNC: 145 MG/DL — HIGH (ref 70–99)
HCT VFR BLD CALC: 24.5 % — LOW (ref 39–50)
HGB BLD-MCNC: 8.1 G/DL — LOW (ref 13–17)
IMM GRANULOCYTES NFR BLD AUTO: 0.3 % — SIGNIFICANT CHANGE UP (ref 0–1.5)
LYMPHOCYTES # BLD AUTO: 0.5 K/UL — LOW (ref 1–3.3)
LYMPHOCYTES # BLD AUTO: 3.2 % — LOW (ref 13–44)
MCHC RBC-ENTMCNC: 33.1 GM/DL — SIGNIFICANT CHANGE UP (ref 32–36)
MCHC RBC-ENTMCNC: 34.3 PG — HIGH (ref 27–34)
MCV RBC AUTO: 103.8 FL — HIGH (ref 80–100)
MONOCYTES # BLD AUTO: 1.84 K/UL — HIGH (ref 0–0.9)
MONOCYTES NFR BLD AUTO: 11.9 % — SIGNIFICANT CHANGE UP (ref 2–14)
NEUTROPHILS # BLD AUTO: 13.12 K/UL — HIGH (ref 1.8–7.4)
NEUTROPHILS NFR BLD AUTO: 84.5 % — HIGH (ref 43–77)
NRBC # BLD: 0 /100 WBCS — SIGNIFICANT CHANGE UP (ref 0–0)
PLATELET # BLD AUTO: 113 K/UL — LOW (ref 150–400)
POTASSIUM SERPL-MCNC: 4.3 MMOL/L — SIGNIFICANT CHANGE UP (ref 3.5–5.3)
POTASSIUM SERPL-SCNC: 4.3 MMOL/L — SIGNIFICANT CHANGE UP (ref 3.5–5.3)
RBC # BLD: 2.36 M/UL — LOW (ref 4.2–5.8)
RBC # FLD: 13.6 % — SIGNIFICANT CHANGE UP (ref 10.3–14.5)
SODIUM SERPL-SCNC: 138 MMOL/L — SIGNIFICANT CHANGE UP (ref 135–145)
SPECIMEN SOURCE: SIGNIFICANT CHANGE UP
WBC # BLD: 15.52 K/UL — HIGH (ref 3.8–10.5)
WBC # FLD AUTO: 15.52 K/UL — HIGH (ref 3.8–10.5)

## 2022-06-19 PROCEDURE — 99232 SBSQ HOSP IP/OBS MODERATE 35: CPT

## 2022-06-19 RX ADMIN — PANTOPRAZOLE SODIUM 40 MILLIGRAM(S): 20 TABLET, DELAYED RELEASE ORAL at 17:45

## 2022-06-19 RX ADMIN — Medication 1000 MILLIGRAM(S): at 06:16

## 2022-06-19 RX ADMIN — Medication 100 MILLIGRAM(S): at 03:00

## 2022-06-19 RX ADMIN — Medication 1000 MILLIGRAM(S): at 00:00

## 2022-06-19 RX ADMIN — SENNA PLUS 2 TABLET(S): 8.6 TABLET ORAL at 21:25

## 2022-06-19 RX ADMIN — OXYCODONE HYDROCHLORIDE 5 MILLIGRAM(S): 5 TABLET ORAL at 09:10

## 2022-06-19 RX ADMIN — OXYCODONE HYDROCHLORIDE 5 MILLIGRAM(S): 5 TABLET ORAL at 14:00

## 2022-06-19 RX ADMIN — Medication 1 GRAM(S): at 13:06

## 2022-06-19 RX ADMIN — Medication 1000 MILLIGRAM(S): at 21:32

## 2022-06-19 RX ADMIN — OXYCODONE HYDROCHLORIDE 5 MILLIGRAM(S): 5 TABLET ORAL at 17:46

## 2022-06-19 RX ADMIN — Medication 1 GRAM(S): at 06:13

## 2022-06-19 RX ADMIN — OXYCODONE HYDROCHLORIDE 5 MILLIGRAM(S): 5 TABLET ORAL at 13:06

## 2022-06-19 RX ADMIN — Medication 1000 MILLIGRAM(S): at 17:45

## 2022-06-19 RX ADMIN — Medication 1000 MILLIGRAM(S): at 13:55

## 2022-06-19 RX ADMIN — Medication 1000 MILLIGRAM(S): at 13:06

## 2022-06-19 RX ADMIN — OXYCODONE HYDROCHLORIDE 5 MILLIGRAM(S): 5 TABLET ORAL at 08:18

## 2022-06-19 RX ADMIN — PANTOPRAZOLE SODIUM 40 MILLIGRAM(S): 20 TABLET, DELAYED RELEASE ORAL at 06:13

## 2022-06-19 RX ADMIN — ENOXAPARIN SODIUM 40 MILLIGRAM(S): 100 INJECTION SUBCUTANEOUS at 06:12

## 2022-06-19 RX ADMIN — Medication 1000 MILLIGRAM(S): at 06:13

## 2022-06-19 RX ADMIN — Medication 1000 MILLIGRAM(S): at 18:40

## 2022-06-19 RX ADMIN — Medication 1 GRAM(S): at 17:46

## 2022-06-19 RX ADMIN — ATORVASTATIN CALCIUM 20 MILLIGRAM(S): 80 TABLET, FILM COATED ORAL at 21:25

## 2022-06-19 RX ADMIN — OXYCODONE HYDROCHLORIDE 5 MILLIGRAM(S): 5 TABLET ORAL at 18:40

## 2022-06-19 RX ADMIN — Medication 1000 MILLIGRAM(S): at 21:25

## 2022-06-19 RX ADMIN — TAMSULOSIN HYDROCHLORIDE 0.4 MILLIGRAM(S): 0.4 CAPSULE ORAL at 21:25

## 2022-06-19 NOTE — PROGRESS NOTE ADULT - ASSESSMENT
The patient is an 88 year old male with a history of HTN, HL, BPH, CAD s/p CABG, mild chronic systolic heart failure who is admitted with a hip fracture.    Plan:  - ECG with mild conduction abnormalities but no evidence of ischemia or infarction  - Continue atorvastatin 20 mg daily  - As per patient's outpatient cardiologist Dr. De La Rosa, the patient had a cardiac catheterization 7/20/21 with patent LIMA-LAD, patient SVG-RCA, patient SVG-D and an occluded SVG-OM. EF was 45%.  - Syncope - echo with grossly normal LV systolic function, mild aortic stenosis. No events on telemetry.  - Aspirin on hold - resume when ok from a GI standpoint  - Hold metoprolol and losartan due to low BPs  - Hemoglobin trending down - monitor - may need to be transfused

## 2022-06-19 NOTE — PROGRESS NOTE ADULT - ASSESSMENT
Pt is seen for Fall  -was mechanical fall.   Didn't hit head.   After the fall pt had Brief LOC  No seizure activity is reported.  Non focal exam except left LE   -Has Left Hip Fx.  No signs of CVA.  Likely had Vasovagal syncope.  Ortho follows  - S/p Left Hip Sx   Pain meds.  PT  Fall/safety precautions.  D/w Pt. Questions answered.  D/w covering nurse.  Would continue to follow.

## 2022-06-19 NOTE — PROGRESS NOTE ADULT - ASSESSMENT
88 year old male with PMHx CAD s/p CABG x4, HTN, HLD, BPH, presents to the ED after fall at home yesterday afternoon. Patient was found on the floor in the kitchen, unwitnessed fall, and had landed on his left side    cad  cabg  heme occult positive  BPH  HTN  HLD  eval for GI bleed    left HIP ORIF - POD - 1  serial H and H  vs noted  labs reviewed  GI follow up      ortho eval  cardio eval  cardiac catheterization July 20, 2021 demonstrating patent LIMA to the LAD, SVG to RCA, SVG to diagonal and a closed SVG to OM.  EF 45%. No stenting was performed  GI eval - GI bleed eval   cvs rx regimen  serial H and H  monitor VS and HD and Sat  I yuri  dvt p - seq teds

## 2022-06-19 NOTE — PROGRESS NOTE ADULT - SUBJECTIVE AND OBJECTIVE BOX
Patient is a 88y old  Male who presents with a chief complaint of ? esophgagitis (17 Jun 2022 16:25)    HPI: 88 year old male with PMHx CAD s/p CABG x4, HTN, HLD, BPH, presents to the ED after fall at home yesterday afternoon. Patient was found on the floor in the kitchen, unwitnessed fall, and had landed on his left side. After the fall family helped him up an moved him to the recliner where he laid down in extreme pain. He then passed out from the pain as per wife for about 1 minute. Patient regained consciousness and was brought to the ED. Patient denies memory of passing out. He also denies fever, chills, cp, sob, abd pain, n/v/d. Patient seen by cardiology and transferred to [facility] for further orthopedic workup and management. Per patient, he was nauseous en route from [facility] to ED and was coughing up dark Emesis. He has no hx of GI bleed and denies any hx of same. Patient has been on ASA long term for hx of CAD.  No seizure reported.  Found to have left Hip Fx.    Interval History  -    S/p Left Hip Sx    MEDICATIONS  (STANDING):    acetaminophen     Tablet .. 1000 milliGRAM(s) Oral every 8 hours  atorvastatin 20 milliGRAM(s) Oral at bedtime  enoxaparin Injectable 40 milliGRAM(s) SubCutaneous every 24 hours  lactated ringers. 1000 milliLiter(s) (100 mL/Hr) IV Continuous <Continuous>  lactated ringers. 1000 milliLiter(s) (100 mL/Hr) IV Continuous <Continuous>  pantoprazole    Tablet 40 milliGRAM(s) Oral every 12 hours  senna 2 Tablet(s) Oral at bedtime  sucralfate suspension 1 Gram(s) Oral every 6 hours  tamsulosin 0.4 milliGRAM(s) Oral at bedtime    MEDICATIONS  (PRN):    aluminum hydroxide/magnesium hydroxide/simethicone Suspension 30 milliLiter(s) Oral four times a day PRN Indigestion  HYDROmorphone  Injectable 0.5 milliGRAM(s) IV Push every 6 hours PRN Severe Pain (7 - 10)  magnesium hydroxide Suspension 30 milliLiter(s) Oral daily PRN Constipation  melatonin 3 milliGRAM(s) Oral at bedtime PRN Insomnia  morphine  - Injectable 2 milliGRAM(s) IV Push every 6 hours PRN Moderate Pain (4 - 6)  ondansetron Injectable 4 milliGRAM(s) IV Push every 6 hours PRN Nausea and/or Vomiting  oxyCODONE    IR 10 milliGRAM(s) Oral every 3 hours PRN Moderate Pain (4 - 6)  oxyCODONE    IR 5 milliGRAM(s) Oral every 3 hours PRN Mild Pain (1 - 3)  polyethylene glycol 3350 17 Gram(s) Oral daily PRN Constipation    Allergies    No Known Allergies    PHYSICAL EXAM:  Vital Signs Last 24 Hrs  T(F): 97.6 (06-17-22 @ 13:03)  HR: 95 (06-17-22 @ 13:03)  BP: 103/62 (06-17-22 @ 13:03)  RR: 19 (06-17-22 @ 13:03)    GENERAL: NAD, well-groomed, well-developed  HEAD:  Atraumatic, Normocephalic  EYES: EOMI, PERRLA, conjunctiva and sclera clear  NECK: Supple, No JVD    On Neurological Examination:    Mental Status - Pt is alert, awake, oriented X3. Higher functions are intact. Follows commands well and able to answer questions appropriately.    Speech -  Normal. Pt has no aphasia.    Cranial Nerves - Pupils 3 mm equal and reactive to light, extraocular eye movements intact. Pt has no visual field deficit. No facial asymmetry. Tongue - is in midline.    Motor Exam - 4 plus/5 all over, except left LE where he has Left Hip Fx.  S/p Left Hip Sx    Sensory Exam - Pt withdraws all extremities equally on stimulation.    Gait - Not tested.   S/p Left Hip Sx    Deep tendon Reflexes - 2 plus all over.    Coordination - Fine finger movements are normal on both sides. Finger to nose is also normal on both sides.       Neck Supple -  Yes.    LABS:    CBC Full  -  ( 19 Jun 2022 06:31 )  WBC Count : 15.52 K/uL  RBC Count : 2.36 M/uL  Hemoglobin : 8.1 g/dL  Hematocrit : 24.5 %  Platelet Count - Automated : 113 K/uL  Mean Cell Volume : 103.8 fl  Mean Cell Hemoglobin : 34.3 pg  Mean Cell Hemoglobin Concentration : 33.1 gm/dL  Auto Neutrophil # : 13.12 K/uL  Auto Lymphocyte # : 0.50 K/uL  Auto Monocyte # : 1.84 K/uL  Auto Eosinophil # : 0.00 K/uL  Auto Basophil # : 0.01 K/uL  Auto Neutrophil % : 84.5 %  Auto Lymphocyte % : 3.2 %  Auto Monocyte % : 11.9 %  Auto Eosinophil % : 0.0 %  Auto Basophil % : 0.1 %    06-19    138  |  105  |  19  ----------------------------<  145<H>  4.3   |  28  |  0.86    Ca    8.1<L>      19 Jun 2022 06:31    TPro  5.6<L>  /  Alb  3.1<L>  /  TBili  0.9  /  DBili  x   /  AST  16  /  ALT  27  /  AlkPhos  53  06-18    RADIOLOGY & ADDITIONAL STUDIES:    < from: CT Hip No Cont, Left (06.17.22 @ 08:11) >    Comminuted left intertrochanteric fracture.    < end of copied text >

## 2022-06-19 NOTE — PROGRESS NOTE ADULT - SUBJECTIVE AND OBJECTIVE BOX
Date/Time Patient Seen:  		  Referring MD:   Data Reviewed	       Patient is a 88y old  Male who presents with a chief complaint of Fall/Syncope (18 Jun 2022 17:34)      Subjective/HPI     PAST MEDICAL & SURGICAL HISTORY:  CAD (coronary artery disease)    HTN (hypertension)    HLD (hyperlipidemia)    S/P CABG x 4    History of cholecystectomy          Medication list         MEDICATIONS  (STANDING):  acetaminophen     Tablet .. 1000 milliGRAM(s) Oral every 6 hours  atorvastatin 20 milliGRAM(s) Oral at bedtime  enoxaparin Injectable 40 milliGRAM(s) SubCutaneous every 24 hours  lactated ringers. 1000 milliLiter(s) (100 mL/Hr) IV Continuous <Continuous>  lactated ringers. 1000 milliLiter(s) (100 mL/Hr) IV Continuous <Continuous>  pantoprazole    Tablet 40 milliGRAM(s) Oral every 12 hours  senna 2 Tablet(s) Oral at bedtime  sucralfate suspension 1 Gram(s) Oral every 6 hours  tamsulosin 0.4 milliGRAM(s) Oral at bedtime    MEDICATIONS  (PRN):  acetaminophen     Tablet .. 650 milliGRAM(s) Oral every 6 hours PRN Temp greater or equal to 38C (100.4F), Mild Pain (1 - 3)  aluminum hydroxide/magnesium hydroxide/simethicone Suspension 30 milliLiter(s) Oral four times a day PRN Indigestion  HYDROmorphone  Injectable 0.5 milliGRAM(s) IV Push every 6 hours PRN Severe Pain (7 - 10)  magnesium hydroxide Suspension 30 milliLiter(s) Oral daily PRN Constipation  melatonin 3 milliGRAM(s) Oral at bedtime PRN Insomnia  morphine  - Injectable 2 milliGRAM(s) IV Push every 6 hours PRN Moderate Pain (4 - 6)  ondansetron Injectable 4 milliGRAM(s) IV Push every 6 hours PRN Nausea and/or Vomiting  oxyCODONE    IR 10 milliGRAM(s) Oral every 3 hours PRN Moderate Pain (4 - 6)  oxyCODONE    IR 5 milliGRAM(s) Oral every 3 hours PRN Mild Pain (1 - 3)  polyethylene glycol 3350 17 Gram(s) Oral daily PRN Constipation         Vitals log        ICU Vital Signs Last 24 Hrs  T(C): 37.1 (19 Jun 2022 03:06), Max: 37.1 (18 Jun 2022 23:24)  T(F): 98.7 (19 Jun 2022 03:06), Max: 98.8 (18 Jun 2022 23:24)  HR: 92 (19 Jun 2022 03:06) (87 - 102)  BP: 109/71 (19 Jun 2022 03:06) (92/57 - 114/73)  BP(mean): --  ABP: --  ABP(mean): --  RR: 17 (19 Jun 2022 03:06) (16 - 29)  SpO2: 96% (19 Jun 2022 03:06) (95% - 100%)           Input and Output:  I&O's Detail    18 Jun 2022 07:01  -  19 Jun 2022 07:00  --------------------------------------------------------  IN:    Lactated Ringers: 1000 mL    Lactated Ringers: 100 mL    Oral Fluid: 120 mL  Total IN: 1220 mL    OUT:    Blood Loss (mL): 50 mL  Total OUT: 50 mL    Total NET: 1170 mL          Lab Data                        8.1    15.52 )-----------( 113      ( 19 Jun 2022 06:31 )             24.5     06-19    138  |  105  |  19  ----------------------------<  145<H>  4.3   |  28  |  0.86    Ca    8.1<L>      19 Jun 2022 06:31    TPro  5.6<L>  /  Alb  3.1<L>  /  TBili  0.9  /  DBili  x   /  AST  16  /  ALT  27  /  AlkPhos  53  06-18            Review of Systems	      Objective     Physical Examination    heart s1s2  lung dec BS  abd soft      Pertinent Lab findings & Imaging      Kyle:  NO   Adequate UO     I&O's Detail    18 Jun 2022 07:01  -  19 Jun 2022 07:00  --------------------------------------------------------  IN:    Lactated Ringers: 1000 mL    Lactated Ringers: 100 mL    Oral Fluid: 120 mL  Total IN: 1220 mL    OUT:    Blood Loss (mL): 50 mL  Total OUT: 50 mL    Total NET: 1170 mL               Discussed with:     Cultures:	        Radiology

## 2022-06-19 NOTE — PHYSICAL THERAPY INITIAL EVALUATION ADULT - ACTIVE RANGE OF MOTION EXAMINATION, REHAB EVAL
Left LE decreased at hip and knee due to sx/bilateral upper extremity Active ROM was WFL (within functional limits)/Right LE Active ROM was WFL (within functional limits)/deficits as listed below

## 2022-06-19 NOTE — PROGRESS NOTE ADULT - SUBJECTIVE AND OBJECTIVE BOX
Patient is a 88y old  Male who presents with a chief complaint of Fall/Syncope (2022 17:34)      INTERVAL HPI/OVERNIGHT EVENTS:    MEDICATIONS  (STANDING):  acetaminophen     Tablet .. 1000 milliGRAM(s) Oral every 6 hours  atorvastatin 20 milliGRAM(s) Oral at bedtime  enoxaparin Injectable 40 milliGRAM(s) SubCutaneous every 24 hours  lactated ringers. 1000 milliLiter(s) (100 mL/Hr) IV Continuous <Continuous>  lactated ringers. 1000 milliLiter(s) (100 mL/Hr) IV Continuous <Continuous>  pantoprazole    Tablet 40 milliGRAM(s) Oral every 12 hours  senna 2 Tablet(s) Oral at bedtime  sucralfate suspension 1 Gram(s) Oral every 6 hours  tamsulosin 0.4 milliGRAM(s) Oral at bedtime    MEDICATIONS  (PRN):  acetaminophen     Tablet .. 650 milliGRAM(s) Oral every 6 hours PRN Temp greater or equal to 38C (100.4F), Mild Pain (1 - 3)  aluminum hydroxide/magnesium hydroxide/simethicone Suspension 30 milliLiter(s) Oral four times a day PRN Indigestion  HYDROmorphone  Injectable 0.5 milliGRAM(s) IV Push every 6 hours PRN Severe Pain (7 - 10)  magnesium hydroxide Suspension 30 milliLiter(s) Oral daily PRN Constipation  melatonin 3 milliGRAM(s) Oral at bedtime PRN Insomnia  morphine  - Injectable 2 milliGRAM(s) IV Push every 6 hours PRN Moderate Pain (4 - 6)  ondansetron Injectable 4 milliGRAM(s) IV Push every 6 hours PRN Nausea and/or Vomiting  oxyCODONE    IR 10 milliGRAM(s) Oral every 3 hours PRN Moderate Pain (4 - 6)  oxyCODONE    IR 5 milliGRAM(s) Oral every 3 hours PRN Mild Pain (1 - 3)  polyethylene glycol 3350 17 Gram(s) Oral daily PRN Constipation      Allergies    No Known Allergies    Intolerances        REVIEW OF SYSTEMS:  CONSTITUTIONAL: No fever, weight loss, or fatigue  EYES: No eye pain, visual disturbances, or discharge  ENMT:  No difficulty hearing, tinnitus, vertigo; No sinus or throat pain  NECK: No pain or stiffness  RESPIRATORY: No cough, wheezing, chills or hemoptysis; No shortness of breath  CARDIOVASCULAR: No chest pain, palpitations, lightheadedness, or leg swelling  GASTROINTESTINAL: No abdominal or epigastric pain. No nausea, vomiting, or hematemesis; No diarrhea or constipation. No melena or hematochezia.  GENITOURINARY: No dysuria, frequency, hematuria, or incontinence  NEUROLOGICAL: No headaches, memory loss, vertigo, loss of strength, numbness, or tremors  SKIN: No itching, burning, rashes, or lesions   LYMPH NODES: No enlarged glands  ENDOCRINE: No heat or cold intolerance; No hair loss; No polydipsia or polyuria  MUSCULOSKELETAL: No joint pain or swelling; No muscle, back, or extremity pain  PSYCHIATRIC: No depression, anxiety, or mood swings  HEME/LYMPH: No easy bruising, or bleeding gums  ALLERGY AND IMMUNOLOGIC: No hives or eczema    PHYSICAL EXAM:  GENERAL: NAD, well-groomed, well-developed  HEAD:  Atraumatic, Normocephalic  EYES: EOMI, PERRLA, conjunctiva and sclera clear  ENMT: Moist mucous membranes, Good dentition, No lesions  NECK: Supple, No JVD appreciated  NERVOUS SYSTEM:  Alert & Oriented X3, Good concentration; All 4 extremities mobile, no gross sensory deficits.   CHEST/LUNG: Clear to auscultation bilaterally; No rales, rhonchi, wheezing, or rubs appreciated  HEART: Regular rate and rhythm; No murmurs, rubs, or gallops  ABDOMEN: Soft, Nontender, Nondistended; Bowel sounds present  EXTREMITIES:  2+ Peripheral Pulses, No clubbing, cyanosis, or edema  LYMPH: No lymphadenopathy noted  SKIN: No rashes or lesions    Vital Signs Last 24 Hrs  T(C): 37.3 (2022 07:54), Max: 37.3 (2022 07:54)  T(F): 99.2 (2022 07:54), Max: 99.2 (2022 07:54)  HR: 89 (2022 07:54) (89 - 102)  BP: 93/58 (2022 07:54) (93/58 - 114/73)  BP(mean): --  RR: 18 (2022 07:54) (16 - 29)  SpO2: 96% (2022 07:54) (95% - 99%)    LABS:                        8.1    15.52 )-----------( 113      ( 2022 06:31 )             24.5     2022 06:31    138    |  105    |  19     ----------------------------<  145    4.3     |  28     |  0.86     Ca    8.1        2022 06:31        Urinalysis Basic - ( 2022 06:05 )    Color: Yellow / Appearance: Clear / S.020 / pH: x  Gluc: x / Ketone: Negative  / Bili: Negative / Urobili: Negative mg/dL   Blood: x / Protein: Negative mg/dL / Nitrite: Negative   Leuk Esterase: Negative / RBC: x / WBC x   Sq Epi: x / Non Sq Epi: x / Bacteria: x      CAPILLARY BLOOD GLUCOSE          RADIOLOGY & ADDITIONAL TESTS:    Imaging Personally Reviewed:  [ ] YES     Consultant(s) Notes Reviewed:      Care Discussed with Consultants/Other Providers:    Advanced Directives: [ ] DNR  [ ] No feeding tube  [ ] MOLST in chart  [ ] MOLST completed today  [ ] Unknown   Patient is a 88y old  Male who presents with a chief complaint of Fall/Syncope (2022 17:34)      INTERVAL HPI/OVERNIGHT EVENTS:  Patient seen awake and sitting in chair in room. He reports some L leg pain and R shoulder pain. Denies fever or chills. Nurse reports desaturation episode overnight and continued oxygen.    MEDICATIONS  (STANDING):  acetaminophen     Tablet .. 1000 milliGRAM(s) Oral every 6 hours  atorvastatin 20 milliGRAM(s) Oral at bedtime  enoxaparin Injectable 40 milliGRAM(s) SubCutaneous every 24 hours  lactated ringers. 1000 milliLiter(s) (100 mL/Hr) IV Continuous <Continuous>  lactated ringers. 1000 milliLiter(s) (100 mL/Hr) IV Continuous <Continuous>  pantoprazole    Tablet 40 milliGRAM(s) Oral every 12 hours  senna 2 Tablet(s) Oral at bedtime  sucralfate suspension 1 Gram(s) Oral every 6 hours  tamsulosin 0.4 milliGRAM(s) Oral at bedtime    MEDICATIONS  (PRN):  acetaminophen     Tablet .. 650 milliGRAM(s) Oral every 6 hours PRN Temp greater or equal to 38C (100.4F), Mild Pain (1 - 3)  aluminum hydroxide/magnesium hydroxide/simethicone Suspension 30 milliLiter(s) Oral four times a day PRN Indigestion  HYDROmorphone  Injectable 0.5 milliGRAM(s) IV Push every 6 hours PRN Severe Pain (7 - 10)  magnesium hydroxide Suspension 30 milliLiter(s) Oral daily PRN Constipation  melatonin 3 milliGRAM(s) Oral at bedtime PRN Insomnia  morphine  - Injectable 2 milliGRAM(s) IV Push every 6 hours PRN Moderate Pain (4 - 6)  ondansetron Injectable 4 milliGRAM(s) IV Push every 6 hours PRN Nausea and/or Vomiting  oxyCODONE    IR 10 milliGRAM(s) Oral every 3 hours PRN Moderate Pain (4 - 6)  oxyCODONE    IR 5 milliGRAM(s) Oral every 3 hours PRN Mild Pain (1 - 3)  polyethylene glycol 3350 17 Gram(s) Oral daily PRN Constipation      Allergies    No Known Allergies    Intolerances        REVIEW OF SYSTEMS:  CONSTITUTIONAL: No fever, weight loss, or fatigue  EYES: No eye pain, visual disturbances, or discharge  ENMT:  No difficulty hearing, tinnitus, vertigo; No sinus or throat pain  NECK: No pain or stiffness  RESPIRATORY: No cough, wheezing, chills or hemoptysis; No shortness of breath  CARDIOVASCULAR: No chest pain, palpitations, lightheadedness, or leg swelling  GASTROINTESTINAL: No abdominal or epigastric pain. No nausea, vomiting, or hematemesis; No diarrhea or constipation. No melena or hematochezia.  GENITOURINARY: No dysuria, frequency, hematuria, or incontinence  NEUROLOGICAL: No headaches, memory loss, vertigo, loss of strength, numbness, or tremors  SKIN: No itching, burning, rashes, or lesions   LYMPH NODES: No enlarged glands  ENDOCRINE: No heat or cold intolerance; No hair loss; No polydipsia or polyuria  MUSCULOSKELETAL: No joint pain or swelling; No muscle, back, or extremity pain  PSYCHIATRIC: No depression, anxiety, or mood swings  HEME/LYMPH: No easy bruising, or bleeding gums  ALLERGY AND IMMUNOLOGIC: No hives or eczema    PHYSICAL EXAM:  GENERAL: NAD, well-groomed, well-developed  HEAD:  Atraumatic, Normocephalic  EYES: EOMI, PERRLA, conjunctiva and sclera clear  ENMT: Moist mucous membranes, Good dentition, No lesions  NECK: Supple, No JVD appreciated  NERVOUS SYSTEM:  Alert & Oriented X3, Good concentration; All 4 extremities mobile, no gross sensory deficits.   CHEST/LUNG: Clear to auscultation bilaterally; No rales, rhonchi, wheezing, or rubs appreciated  HEART: Regular rate and rhythm; No murmurs, rubs, or gallops  ABDOMEN: Soft, Nontender, Nondistended; Bowel sounds present  EXTREMITIES:  2+ Peripheral Pulses, No clubbing, cyanosis, or edema  LYMPH: No lymphadenopathy noted  SKIN: No rashes or lesions    Vital Signs Last 24 Hrs  T(C): 37.3 (2022 07:54), Max: 37.3 (2022 07:54)  T(F): 99.2 (2022 07:54), Max: 99.2 (2022 07:54)  HR: 89 (2022 07:54) (89 - 102)  BP: 93/58 (2022 07:54) (93/58 - 114/73)  BP(mean): --  RR: 18 (2022 07:54) (16 - 29)  SpO2: 96% (2022 07:54) (95% - 99%)    LABS:                        8.1    15.52 )-----------( 113      ( 2022 06:31 )             24.5     2022 06:31    138    |  105    |  19     ----------------------------<  145    4.3     |  28     |  0.86     Ca    8.1        2022 06:31        Urinalysis Basic - ( 2022 06:05 )    Color: Yellow / Appearance: Clear / S.020 / pH: x  Gluc: x / Ketone: Negative  / Bili: Negative / Urobili: Negative mg/dL   Blood: x / Protein: Negative mg/dL / Nitrite: Negative   Leuk Esterase: Negative / RBC: x / WBC x   Sq Epi: x / Non Sq Epi: x / Bacteria: x      CAPILLARY BLOOD GLUCOSE          RADIOLOGY & ADDITIONAL TESTS:    Imaging Personally Reviewed:  [ ] YES     Consultant(s) Notes Reviewed:      Care Discussed with Consultants/Other Providers:    Advanced Directives: [ ] DNR  [ ] No feeding tube  [ ] MOLST in chart  [ ] MOLST completed today  [ ] Unknown   Patient is a 88y old  Male who presents with a chief complaint of Fall/Syncope (2022 17:34)      INTERVAL HPI/OVERNIGHT EVENTS:  Patient seen awake and sitting in chair in room. He reports some L leg pain and R shoulder pain. Denies fever or chills. Nurse reports desaturation episode overnight and continued oxygen.    MEDICATIONS  (STANDING):  acetaminophen     Tablet .. 1000 milliGRAM(s) Oral every 6 hours  atorvastatin 20 milliGRAM(s) Oral at bedtime  enoxaparin Injectable 40 milliGRAM(s) SubCutaneous every 24 hours  lactated ringers. 1000 milliLiter(s) (100 mL/Hr) IV Continuous <Continuous>  lactated ringers. 1000 milliLiter(s) (100 mL/Hr) IV Continuous <Continuous>  pantoprazole    Tablet 40 milliGRAM(s) Oral every 12 hours  senna 2 Tablet(s) Oral at bedtime  sucralfate suspension 1 Gram(s) Oral every 6 hours  tamsulosin 0.4 milliGRAM(s) Oral at bedtime    MEDICATIONS  (PRN):  acetaminophen     Tablet .. 650 milliGRAM(s) Oral every 6 hours PRN Temp greater or equal to 38C (100.4F), Mild Pain (1 - 3)  aluminum hydroxide/magnesium hydroxide/simethicone Suspension 30 milliLiter(s) Oral four times a day PRN Indigestion  HYDROmorphone  Injectable 0.5 milliGRAM(s) IV Push every 6 hours PRN Severe Pain (7 - 10)  magnesium hydroxide Suspension 30 milliLiter(s) Oral daily PRN Constipation  melatonin 3 milliGRAM(s) Oral at bedtime PRN Insomnia  morphine  - Injectable 2 milliGRAM(s) IV Push every 6 hours PRN Moderate Pain (4 - 6)  ondansetron Injectable 4 milliGRAM(s) IV Push every 6 hours PRN Nausea and/or Vomiting  oxyCODONE    IR 10 milliGRAM(s) Oral every 3 hours PRN Moderate Pain (4 - 6)  oxyCODONE    IR 5 milliGRAM(s) Oral every 3 hours PRN Mild Pain (1 - 3)  polyethylene glycol 3350 17 Gram(s) Oral daily PRN Constipation      Allergies    No Known Allergies    Intolerances        REVIEW OF SYSTEMS:  CONSTITUTIONAL: No fever, weight loss, or fatigue  EYES: No eye pain, visual disturbances, or discharge  RESPIRATORY: No cough, wheezing, chills or hemoptysis; No shortness of breath  CARDIOVASCULAR: No chest pain, palpitations, lightheadedness, or leg swelling  GASTROINTESTINAL: No abdominal or epigastric pain. No nausea, vomiting, or hematemesis; No diarrhea or constipation. No melena or hematochezia.  GENITOURINARY: No dysuria, frequency, hematuria, or incontinence  NEUROLOGICAL: No headaches, memory loss, vertigo, loss of strength, numbness, or tremors  SKIN: No itching, burning, rashes, or lesions   MUSCULOSKELETAL: R shoulder pain, L leg pain, lower extremity muscle spasms. No joint swelling  PSYCHIATRIC: No depression, anxiety, or mood swings  HEME/LYMPH: No easy bruising, or bleeding gums  ALLERGY AND IMMUNOLOGIC: No hives or eczema    PHYSICAL EXAM:  GENERAL: NAD, well-groomed, well-developed, laying in bed.  HEAD:  Atraumatic, Normocephalic  EYES: EOMI, PERRLA, conjunctiva and sclera clear  ENMT: Moist mucous membranes, Good dentition  NERVOUS SYSTEM:  Alert & Oriented X3, Good concentration; All 4 extremities mobile, no gross sensory deficits.   CHEST/LUNG: Clear to auscultation bilaterally; No rales, rhonchi, wheezing, or rubs appreciated  HEART: Regular rate and rhythm; No murmurs, rubs, or gallops  ABDOMEN: Soft, Nontender, Nondistended  EXTREMITIES:  2+ Peripheral Pulses, No clubbing, cyanosis, or edema appreciated.   LYMPH: No lymphadenopathy noted  SKIN: No rashes or lesions appreciated      Vital Signs Last 24 Hrs  T(C): 37.3 (2022 07:54), Max: 37.3 (2022 07:54)  T(F): 99.2 (2022 07:54), Max: 99.2 (2022 07:54)  HR: 89 (2022 07:54) (89 - 102)  BP: 93/58 (2022 07:54) (93/58 - 114/73)  BP(mean): --  RR: 18 (2022 07:54) (16 - 29)  SpO2: 96% (2022 07:54) (95% - 99%)    LABS:                        8.1    15.52 )-----------( 113      ( 2022 06:31 )             24.5     2022 06:31    138    |  105    |  19     ----------------------------<  145    4.3     |  28     |  0.86     Ca    8.1        2022 06:31        Urinalysis Basic - ( 2022 06:05 )    Color: Yellow / Appearance: Clear / S.020 / pH: x  Gluc: x / Ketone: Negative  / Bili: Negative / Urobili: Negative mg/dL   Blood: x / Protein: Negative mg/dL / Nitrite: Negative   Leuk Esterase: Negative / RBC: x / WBC x   Sq Epi: x / Non Sq Epi: x / Bacteria: x      CAPILLARY BLOOD GLUCOSE

## 2022-06-19 NOTE — PROGRESS NOTE ADULT - SUBJECTIVE AND OBJECTIVE BOX
ORTHOPEDIC PA PROGRESS NOTE  CARISSA SENIOR      88y Male                                 SY [id number]                                                                                                                           POD #    2d    STATUS POST:       Procedure: Open reduction and internal fixation (ORIF) of left hip using trochanteric shannon               Patient seen and examined at bedside.      Current Pain Management:    acetaminophen     Tablet .. 650 milliGRAM(s) Oral every 6 hours PRN  acetaminophen     Tablet .. 1000 milliGRAM(s) Oral every 6 hours  HYDROmorphone  Injectable 0.5 milliGRAM(s) IV Push every 6 hours PRN  melatonin 3 milliGRAM(s) Oral at bedtime PRN  morphine  - Injectable 2 milliGRAM(s) IV Push every 6 hours PRN  ondansetron Injectable 4 milliGRAM(s) IV Push every 6 hours PRN  oxyCODONE    IR 10 milliGRAM(s) Oral every 3 hours PRN  oxyCODONE    IR 5 milliGRAM(s) Oral every 3 hours PRN      T(F): 99.2  HR: 89  BP: 93/58  RR: 18  SpO2: 96%                         8.1    15.52 )-----------( 113      ( 19 Jun 2022 06:31 )             24.5         06-19    138  |  105  |  19  ----------------------------<  145<H>  4.3   |  28  |  0.86    Ca    8.1<L>      19 Jun 2022 06:31      06-18-22 @ 07:01  -  06-19-22 @ 07:00  --------------------------------------------------------  IN:    Lactated Ringers: 1000 mL    Lactated Ringers: 100 mL    Oral Fluid: 120 mL  Total IN: 1220 mL    OUT:    Blood Loss (mL): 50 mL  Total OUT: 50 mL    Total NET: 1170 mL        Physical Exam :    -   Dressing C/D/I.   -   Distal Neurvascular status intact grossly.   -   Warm well perfused; capillary refill <3 seconds   -   (+)EHL/FHL   -   (+) Sensation to light touch  -   (-) Calf tenderness Bilaterally      A/P: 88y Male s/p Open reduction and internal fixation (ORIF) of left hip using trochanteric shannon       -   Ortho Stable  -   Pain control:  acetaminophen     Tablet .. 650 milliGRAM(s) Oral every 6 hours PRN  acetaminophen     Tablet .. 1000 milliGRAM(s) Oral every 6 hours  acetaminophen     Tablet .. 650 milliGRAM(s) Oral every 6 hours PRN  HYDROmorphone  Injectable 0.5 milliGRAM(s) IV Push every 6 hours PRN  HYDROmorphone  Injectable 0.5 milliGRAM(s) IV Push every 4 hours PRN  melatonin 3 milliGRAM(s) Oral at bedtime PRN  melatonin 3 milliGRAM(s) Oral at bedtime PRN  morphine  - Injectable 2 milliGRAM(s) IV Push every 6 hours PRN  morphine  - Injectable 2 milliGRAM(s) IV Push every 4 hours PRN  ondansetron Injectable 4 milliGRAM(s) IV Push every 6 hours PRN  oxyCODONE    IR 10 milliGRAM(s) Oral every 3 hours PRN  oxyCODONE    IR 5 milliGRAM(s) Oral every 3 hours PRN    -   Medicine to follow  -   DVT ppx:    PAS +  enoxaparin Injectable: 40 milliGRAM(s) SubCutaneous  -   PT/OT OOB,  Weight bearing status: WBAT   -  Dispo:  TBD

## 2022-06-19 NOTE — PHYSICAL THERAPY INITIAL EVALUATION ADULT - PERTINENT HX OF CURRENT PROBLEM, REHAB EVAL
s/p fall at home. resulting in left hip fracture.presents to the ED after fall at home yesterday afternoon. Patient was found on the floor in the kitchen, unwitnessed fall, and had landed on his left side. After the fall family helped him up an moved him to the recliner where he laid down in extreme pain. He then passed out from the pain as per wife for about 1 minute

## 2022-06-19 NOTE — PHYSICAL THERAPY INITIAL EVALUATION ADULT - ADDITIONAL COMMENTS
Pt lives in house with 4  steps to enter with handrail,  will stay on 1st floor.  Owns DME: RW cane 2 x w/c.b <1 block outdoors with RW.

## 2022-06-19 NOTE — PROGRESS NOTE ADULT - ASSESSMENT
88 year old male with PMHx CAD s/p CABG x4, HTN, HLD, BPH, presented to the ED after fall at home yesterday afternoon admitted for left hip fracture with planned OR with orthopedic surgery.     Fracture of left hip.   - s/p ORIF L hip 6/18  - continue pain medication as per ortho  - Fall precautions  - PT eval for post op    Emesis   -Patient was noted to be coughing up coffee ground emesis en route from [facility]  -GI consulted, appreciate reccs  -trend CBC  -PPI, Carafate as per GI    Syncope.   Patient reportedly passed out in his recliner after the fall while in extreme pain as per wife   - likely vagal mediated syncope   - regained consciousness after ~1 minute   - patient has no residual deficits   - TSH WNL- Neurology consulted Dr. Nevarez   - Echo findings appear unrelated to episode    Leukocytosis.   WBC 16.17 on admission likely reactive  - Afebrile, appears non toxic  - s/p cefazolin postop  - Surveillance blood cultures no growth to date    Anemia.   H/H 11.0/33.7 on admission, baseline hemoglobin unknown  - Downtrending Hb postop but currently hemodynamically stable, monitor for signs and symptoms of active bleeding  - Maintain active type and screen  - F/u iron panel: iron, ferritin, TIBC, transferrin  - Folate, vitamin B12 WNL  - Trend CBC.    CAD (coronary artery disease).   s/p CABG x4 2003  - Hold Aspirin for now  - continue statin  - stable, no signs or sx of ACS  - cardiology following    Hypertension.   - continue Metoprolol, Losartan   - Monitor routine hemodynamics    Hyperlipidemia.   Continue home Atorvastatin 20 mg PO qhs   holding home ASA 81mg for now, plan to resume postop    BPH (benign prostatic hyperplasia).   Continue home Tamsulosin 0.4 mg PO QD    Need for prophylactic measure.   SCD's for now    IMPROVE VTE Individual Risk Assessment          RISK                                                          Points  [  ] Previous VTE                                                3  [  ] Thrombophilia                                             2  [  ] Lower limb paralysis                                   2        (unable to hold up >15 seconds)    [  ] Current Cancer                                             2         (within 6 months)  [  ] Immobilization > 24 hrs                              1  [  ] ICU/CCU stay > 24 hours                             1  [ x ] Age > 60                                                         1    IMPROVE VTE Score: 1.   88 year old male with PMHx CAD s/p CABG x4, HTN, HLD, BPH, presented to the ED after fall at home yesterday afternoon admitted for left hip fracture with planned OR with orthopedic surgery.     Fracture of left hip  - s/p ORIF L hip 6/18  - continue pain medication as per ortho  - Fall precautions  - PT eval for post op    Emesis  -Patient was noted to be coughing up coffee ground emesis en route from [facility]  -GI consulted, appreciate reccs  -trend CBC  -PPI, Carafate as per GI    Syncope  Patient reportedly passed out in his recliner after the fall while in extreme pain as per wife   - likely vagal mediated syncope   - regained consciousness after ~1 minute   - patient has no residual deficits   - TSH WNL- Neurology consulted Dr. Nevarez   - Echo findings appear unrelated to episode    Acute Hypoxic respiratory failure  continue supplemental O2, plan to wean  monitor SpO2, goal >92%  anemia may be playing a role    Leukocytosis.   WBC 16.17 on admission likely reactive  - Afebrile, appears non toxic  - s/p cefazolin postop  - Surveillance blood cultures no growth to date    Anemia.   H/H 11.0/33.7 on admission, baseline hemoglobin unknown  - Downtrending Hb postop but currently hemodynamically stable, monitor for signs and symptoms of active bleeding  - Maintain active type and screen  - F/u iron panel: iron, ferritin, TIBC, transferrin  - Folate, vitamin B12 WNL  - Trend CBC.    CAD (coronary artery disease).   s/p CABG x4 2003  - Hold Aspirin for now  - continue statin  - stable, no signs or sx of ACS  - cardiology following    Hypertension  - continue Metoprolol, Losartan   - Monitor routine hemodynamics    Hyperlipidemia  Continue home Atorvastatin 20 mg PO qhs   holding home ASA 81mg for now, plan to resume postop    BPH (benign prostatic hyperplasia).   Continue home Tamsulosin 0.4 mg PO QD    Need for prophylactic measure.   SCD's for now    IMPROVE VTE Individual Risk Assessment          RISK                                                          Points  [  ] Previous VTE                                                3  [  ] Thrombophilia                                             2  [  ] Lower limb paralysis                                   2        (unable to hold up >15 seconds)    [  ] Current Cancer                                             2         (within 6 months)  [  ] Immobilization > 24 hrs                              1  [  ] ICU/CCU stay > 24 hours                             1  [ x ] Age > 60                                                         1    IMPROVE VTE Score: 1.

## 2022-06-19 NOTE — PHYSICAL THERAPY INITIAL EVALUATION ADULT - DIAGNOSIS, PT EVAL
1. Menactra (meningitis booster before college)  And HPV #2 vaccine today.    Preventive Health Recommendations  Male Ages 18 - 20     Yearly exam:             See your health care provider every year in order to  o   Review health changes.   o   Discuss preventive care.    o   Review your medicines if your doctor has prescribed any.    You should be tested each year for STDs (sexually transmitted diseases).     Talk to your provider about cholesterol testing.      If you are at risk for diabetes, you should have a diabetes test (fasting glucose).    Shots: Get a flu shot each year. Get a tetanus shot every 10 years.     Nutrition:    Eat at least 5 servings of fruits and vegetables daily.     Eat whole-grain bread, whole-wheat pasta and brown rice instead of white grains and rice.     Get adequate calcium and Vitamin D.     Lifestyle    Exercise for at least 150 minutes a week (30 minutes a day, 5 days a week). This will help you control your weight and prevent disease.     No smoking.     Wear sunscreen to prevent skin cancer.     See your dentist every six months for an exam and cleaning.      Gait instability and decreased independent function

## 2022-06-19 NOTE — PROGRESS NOTE ADULT - SUBJECTIVE AND OBJECTIVE BOX
Chief Complaint: Fall    Interval Events: Underwent surgery yesterday.    Review of Systems:  General: No fevers, chills, weight gain  Skin: No rashes, color changes  Cardiovascular: No chest pain, orthopnea  Respiratory: No shortness of breath, cough  Gastrointestinal: No nausea, abdominal pain  Genitourinary: No incontinence, pain with urination  Musculoskeletal: +pain, swelling, decreased range of motion  Neurological: No headache, weakness  Psychiatric: No depression, anxiety  Endocrine: No weight gain, increased thirst  All other systems are comprehensively negative.    Physical Exam:  Vitals:        Vital Signs Last 24 Hrs  T(C): 37.3 (19 Jun 2022 07:54), Max: 37.3 (19 Jun 2022 07:54)  T(F): 99.2 (19 Jun 2022 07:54), Max: 99.2 (19 Jun 2022 07:54)  HR: 89 (19 Jun 2022 07:54) (87 - 102)  BP: 93/58 (19 Jun 2022 07:54) (93/58 - 114/73)  BP(mean): --  RR: 18 (19 Jun 2022 07:54) (16 - 29)  SpO2: 96% (19 Jun 2022 07:54) (95% - 100%)  General: NAD  HEENT: MMM  Neck: No JVD, no carotid bruit  Lungs: CTAB  CV: RRR, nl S1/S2, no M/R/G  Abdomen: S/NT/ND, +BS  Extremities: No LE edema, no cyanosis  Neuro: AAOx3, non-focal  Skin: No rash    Labs:                        8.1    15.52 )-----------( 113      ( 19 Jun 2022 06:31 )             24.5     06-19    138  |  105  |  19  ----------------------------<  145<H>  4.3   |  28  |  0.86    Ca    8.1<L>      19 Jun 2022 06:31    TPro  5.6<L>  /  Alb  3.1<L>  /  TBili  0.9  /  DBili  x   /  AST  16  /  ALT  27  /  AlkPhos  53  06-18        PT/INR - ( 17 Jun 2022 14:50 )   PT: 13.7 sec;   INR: 1.19 ratio             Telemetry: Sinus rhythm

## 2022-06-20 LAB
ANION GAP SERPL CALC-SCNC: 5 MMOL/L — SIGNIFICANT CHANGE UP (ref 5–17)
BLD GP AB SCN SERPL QL: SIGNIFICANT CHANGE UP
BUN SERPL-MCNC: 22 MG/DL — SIGNIFICANT CHANGE UP (ref 7–23)
CALCIUM SERPL-MCNC: 8.1 MG/DL — LOW (ref 8.4–10.5)
CHLORIDE SERPL-SCNC: 107 MMOL/L — SIGNIFICANT CHANGE UP (ref 96–108)
CO2 SERPL-SCNC: 30 MMOL/L — SIGNIFICANT CHANGE UP (ref 22–31)
CREAT SERPL-MCNC: 0.98 MG/DL — SIGNIFICANT CHANGE UP (ref 0.5–1.3)
EGFR: 74 ML/MIN/1.73M2 — SIGNIFICANT CHANGE UP
GLUCOSE SERPL-MCNC: 108 MG/DL — HIGH (ref 70–99)
HCT VFR BLD CALC: 22.2 % — LOW (ref 39–50)
HCT VFR BLD CALC: 23.8 % — LOW (ref 39–50)
HGB BLD-MCNC: 7.1 G/DL — LOW (ref 13–17)
HGB BLD-MCNC: 7.9 G/DL — LOW (ref 13–17)
MCHC RBC-ENTMCNC: 32 GM/DL — SIGNIFICANT CHANGE UP (ref 32–36)
MCHC RBC-ENTMCNC: 33.2 GM/DL — SIGNIFICANT CHANGE UP (ref 32–36)
MCHC RBC-ENTMCNC: 33.5 PG — SIGNIFICANT CHANGE UP (ref 27–34)
MCHC RBC-ENTMCNC: 33.8 PG — SIGNIFICANT CHANGE UP (ref 27–34)
MCV RBC AUTO: 101.7 FL — HIGH (ref 80–100)
MCV RBC AUTO: 104.7 FL — HIGH (ref 80–100)
NRBC # BLD: 0 /100 WBCS — SIGNIFICANT CHANGE UP (ref 0–0)
NRBC # BLD: 0 /100 WBCS — SIGNIFICANT CHANGE UP (ref 0–0)
PLATELET # BLD AUTO: 110 K/UL — LOW (ref 150–400)
PLATELET # BLD AUTO: 118 K/UL — LOW (ref 150–400)
POTASSIUM SERPL-MCNC: 3.9 MMOL/L — SIGNIFICANT CHANGE UP (ref 3.5–5.3)
POTASSIUM SERPL-SCNC: 3.9 MMOL/L — SIGNIFICANT CHANGE UP (ref 3.5–5.3)
RBC # BLD: 2.12 M/UL — LOW (ref 4.2–5.8)
RBC # BLD: 2.34 M/UL — LOW (ref 4.2–5.8)
RBC # FLD: 13.7 % — SIGNIFICANT CHANGE UP (ref 10.3–14.5)
RBC # FLD: 14.8 % — HIGH (ref 10.3–14.5)
SARS-COV-2 RNA SPEC QL NAA+PROBE: SIGNIFICANT CHANGE UP
SODIUM SERPL-SCNC: 142 MMOL/L — SIGNIFICANT CHANGE UP (ref 135–145)
WBC # BLD: 10.84 K/UL — HIGH (ref 3.8–10.5)
WBC # BLD: 10.91 K/UL — HIGH (ref 3.8–10.5)
WBC # FLD AUTO: 10.84 K/UL — HIGH (ref 3.8–10.5)
WBC # FLD AUTO: 10.91 K/UL — HIGH (ref 3.8–10.5)

## 2022-06-20 PROCEDURE — 82607 VITAMIN B-12: CPT

## 2022-06-20 PROCEDURE — 82746 ASSAY OF FOLIC ACID SERUM: CPT

## 2022-06-20 PROCEDURE — 86901 BLOOD TYPING SEROLOGIC RH(D): CPT

## 2022-06-20 PROCEDURE — 84466 ASSAY OF TRANSFERRIN: CPT

## 2022-06-20 PROCEDURE — 73700 CT LOWER EXTREMITY W/O DYE: CPT

## 2022-06-20 PROCEDURE — 85610 PROTHROMBIN TIME: CPT

## 2022-06-20 PROCEDURE — 85025 COMPLETE CBC W/AUTO DIFF WBC: CPT

## 2022-06-20 PROCEDURE — 93005 ELECTROCARDIOGRAM TRACING: CPT

## 2022-06-20 PROCEDURE — 80053 COMPREHEN METABOLIC PANEL: CPT

## 2022-06-20 PROCEDURE — 99285 EMERGENCY DEPT VISIT HI MDM: CPT

## 2022-06-20 PROCEDURE — 93306 TTE W/DOPPLER COMPLETE: CPT | Mod: 26

## 2022-06-20 PROCEDURE — 73552 X-RAY EXAM OF FEMUR 2/>: CPT

## 2022-06-20 PROCEDURE — 84443 ASSAY THYROID STIM HORMONE: CPT

## 2022-06-20 PROCEDURE — 85730 THROMBOPLASTIN TIME PARTIAL: CPT

## 2022-06-20 PROCEDURE — 73502 X-RAY EXAM HIP UNI 2-3 VIEWS: CPT

## 2022-06-20 PROCEDURE — 96375 TX/PRO/DX INJ NEW DRUG ADDON: CPT

## 2022-06-20 PROCEDURE — 36415 COLL VENOUS BLD VENIPUNCTURE: CPT

## 2022-06-20 PROCEDURE — 86900 BLOOD TYPING SEROLOGIC ABO: CPT

## 2022-06-20 PROCEDURE — 84484 ASSAY OF TROPONIN QUANT: CPT

## 2022-06-20 PROCEDURE — 83036 HEMOGLOBIN GLYCOSYLATED A1C: CPT

## 2022-06-20 PROCEDURE — 86850 RBC ANTIBODY SCREEN: CPT

## 2022-06-20 PROCEDURE — 99233 SBSQ HOSP IP/OBS HIGH 50: CPT

## 2022-06-20 PROCEDURE — 83690 ASSAY OF LIPASE: CPT

## 2022-06-20 PROCEDURE — 82550 ASSAY OF CK (CPK): CPT

## 2022-06-20 PROCEDURE — 80061 LIPID PANEL: CPT

## 2022-06-20 PROCEDURE — 96374 THER/PROPH/DIAG INJ IV PUSH: CPT

## 2022-06-20 PROCEDURE — 71045 X-RAY EXAM CHEST 1 VIEW: CPT

## 2022-06-20 PROCEDURE — 83550 IRON BINDING TEST: CPT

## 2022-06-20 PROCEDURE — 76376 3D RENDER W/INTRP POSTPROCES: CPT

## 2022-06-20 PROCEDURE — 87635 SARS-COV-2 COVID-19 AMP PRB: CPT

## 2022-06-20 PROCEDURE — 82728 ASSAY OF FERRITIN: CPT

## 2022-06-20 PROCEDURE — 83540 ASSAY OF IRON: CPT

## 2022-06-20 RX ORDER — OXYCODONE HYDROCHLORIDE 5 MG/1
1 TABLET ORAL
Qty: 0 | Refills: 0 | DISCHARGE
Start: 2022-06-20

## 2022-06-20 RX ORDER — ACETAMINOPHEN 500 MG
2 TABLET ORAL
Qty: 0 | Refills: 0 | DISCHARGE
Start: 2022-06-20

## 2022-06-20 RX ORDER — ASPIRIN/CALCIUM CARB/MAGNESIUM 324 MG
1 TABLET ORAL
Qty: 0 | Refills: 0 | DISCHARGE

## 2022-06-20 RX ORDER — PANTOPRAZOLE SODIUM 20 MG/1
1 TABLET, DELAYED RELEASE ORAL
Qty: 0 | Refills: 0 | DISCHARGE
Start: 2022-06-20

## 2022-06-20 RX ORDER — LANOLIN ALCOHOL/MO/W.PET/CERES
1 CREAM (GRAM) TOPICAL
Qty: 0 | Refills: 0 | DISCHARGE
Start: 2022-06-20

## 2022-06-20 RX ORDER — ENOXAPARIN SODIUM 100 MG/ML
40 INJECTION SUBCUTANEOUS
Qty: 0 | Refills: 0 | DISCHARGE
Start: 2022-06-20

## 2022-06-20 RX ADMIN — OXYCODONE HYDROCHLORIDE 5 MILLIGRAM(S): 5 TABLET ORAL at 11:57

## 2022-06-20 RX ADMIN — Medication 1000 MILLIGRAM(S): at 14:34

## 2022-06-20 RX ADMIN — Medication 1000 MILLIGRAM(S): at 06:02

## 2022-06-20 RX ADMIN — TAMSULOSIN HYDROCHLORIDE 0.4 MILLIGRAM(S): 0.4 CAPSULE ORAL at 21:42

## 2022-06-20 RX ADMIN — Medication 1000 MILLIGRAM(S): at 21:42

## 2022-06-20 RX ADMIN — Medication 1 GRAM(S): at 00:10

## 2022-06-20 RX ADMIN — Medication 1 GRAM(S): at 05:59

## 2022-06-20 RX ADMIN — ENOXAPARIN SODIUM 40 MILLIGRAM(S): 100 INJECTION SUBCUTANEOUS at 05:59

## 2022-06-20 RX ADMIN — OXYCODONE HYDROCHLORIDE 5 MILLIGRAM(S): 5 TABLET ORAL at 12:29

## 2022-06-20 RX ADMIN — PANTOPRAZOLE SODIUM 40 MILLIGRAM(S): 20 TABLET, DELAYED RELEASE ORAL at 05:59

## 2022-06-20 RX ADMIN — Medication 1000 MILLIGRAM(S): at 05:59

## 2022-06-20 RX ADMIN — Medication 1000 MILLIGRAM(S): at 14:44

## 2022-06-20 RX ADMIN — PANTOPRAZOLE SODIUM 40 MILLIGRAM(S): 20 TABLET, DELAYED RELEASE ORAL at 17:26

## 2022-06-20 RX ADMIN — Medication 1 GRAM(S): at 23:35

## 2022-06-20 RX ADMIN — SENNA PLUS 2 TABLET(S): 8.6 TABLET ORAL at 21:42

## 2022-06-20 RX ADMIN — Medication 1 GRAM(S): at 17:26

## 2022-06-20 RX ADMIN — Medication 1000 MILLIGRAM(S): at 21:45

## 2022-06-20 RX ADMIN — ATORVASTATIN CALCIUM 20 MILLIGRAM(S): 80 TABLET, FILM COATED ORAL at 21:42

## 2022-06-20 RX ADMIN — Medication 1 GRAM(S): at 11:52

## 2022-06-20 NOTE — DISCHARGE NOTE PROVIDER - INSTRUCTIONS
For Constipation :   • Increase your water intake. Drink at least 8 glasses of water daily.  • Try adding fiber to your diet by eating fruits, vegetables and foods that are rich in grains.  • If you do experience constipation, you may take an over-the-counter stool softener/laxative such as Regina Colace, Senekot, miralax or  Milk of Magnesia.

## 2022-06-20 NOTE — PROGRESS NOTE ADULT - SUBJECTIVE AND OBJECTIVE BOX
Patient is a 88y old  Male who presents with a chief complaint of Fall/Syncope (20 Jun 2022 14:08)      INTERVAL HPI/OVERNIGHT EVENTS:    MEDICATIONS  (STANDING):  acetaminophen     Tablet .. 1000 milliGRAM(s) Oral every 8 hours  atorvastatin 20 milliGRAM(s) Oral at bedtime  enoxaparin Injectable 40 milliGRAM(s) SubCutaneous every 24 hours  lactated ringers. 1000 milliLiter(s) (100 mL/Hr) IV Continuous <Continuous>  lactated ringers. 1000 milliLiter(s) (100 mL/Hr) IV Continuous <Continuous>  pantoprazole    Tablet 40 milliGRAM(s) Oral every 12 hours  senna 2 Tablet(s) Oral at bedtime  sucralfate suspension 1 Gram(s) Oral every 6 hours  tamsulosin 0.4 milliGRAM(s) Oral at bedtime    MEDICATIONS  (PRN):  aluminum hydroxide/magnesium hydroxide/simethicone Suspension 30 milliLiter(s) Oral four times a day PRN Indigestion  bisacodyl Suppository 10 milliGRAM(s) Rectal daily PRN If no bowel movement  HYDROmorphone  Injectable 0.5 milliGRAM(s) IV Push every 6 hours PRN Severe Pain (7 - 10)  magnesium hydroxide Suspension 30 milliLiter(s) Oral daily PRN Constipation  melatonin 3 milliGRAM(s) Oral at bedtime PRN Insomnia  morphine  - Injectable 2 milliGRAM(s) IV Push every 6 hours PRN Moderate Pain (4 - 6)  ondansetron Injectable 4 milliGRAM(s) IV Push every 6 hours PRN Nausea and/or Vomiting  oxyCODONE    IR 10 milliGRAM(s) Oral every 3 hours PRN Moderate Pain (4 - 6)  oxyCODONE    IR 5 milliGRAM(s) Oral every 3 hours PRN Mild Pain (1 - 3)  polyethylene glycol 3350 17 Gram(s) Oral daily PRN Constipation      Allergies    No Known Allergies    Intolerances        REVIEW OF SYSTEMS:  CONSTITUTIONAL: No fever, weight loss, or fatigue  EYES: No eye pain, visual disturbances, or discharge  ENMT:  No difficulty hearing, tinnitus, vertigo; No sinus or throat pain  NECK: No pain or stiffness  RESPIRATORY: No cough, wheezing, chills or hemoptysis; No shortness of breath  CARDIOVASCULAR: No chest pain, palpitations, lightheadedness, or leg swelling  GASTROINTESTINAL: No abdominal or epigastric pain. No nausea, vomiting, or hematemesis; No diarrhea or constipation. No melena or hematochezia.  GENITOURINARY: No dysuria, frequency, hematuria, or incontinence  NEUROLOGICAL: No headaches, memory loss, vertigo, loss of strength, numbness, or tremors  SKIN: No itching, burning, rashes, or lesions   LYMPH NODES: No enlarged glands  ENDOCRINE: No heat or cold intolerance; No hair loss; No polydipsia or polyuria  MUSCULOSKELETAL: No joint pain or swelling; No muscle, back, or extremity pain  PSYCHIATRIC: No depression, anxiety, or mood swings  HEME/LYMPH: No easy bruising, or bleeding gums  ALLERGY AND IMMUNOLOGIC: No hives or eczema    PHYSICAL EXAM:  GENERAL: NAD, well-groomed, well-developed  HEAD:  Atraumatic, Normocephalic  EYES: EOMI, PERRLA, conjunctiva and sclera clear  ENMT: Moist mucous membranes, Good dentition, No lesions  NECK: Supple, No JVD appreciated  NERVOUS SYSTEM:  Alert & Oriented X3, Good concentration; All 4 extremities mobile, no gross sensory deficits.   CHEST/LUNG: Clear to auscultation bilaterally; No rales, rhonchi, wheezing, or rubs appreciated  HEART: Regular rate and rhythm; No murmurs, rubs, or gallops  ABDOMEN: Soft, Nontender, Nondistended; Bowel sounds present  EXTREMITIES:  2+ Peripheral Pulses, No clubbing, cyanosis, or edema  LYMPH: No lymphadenopathy noted  SKIN: No rashes or lesions    Vital Signs Last 24 Hrs  T(C): 36.9 (20 Jun 2022 15:36), Max: 37.2 (20 Jun 2022 10:10)  T(F): 98.5 (20 Jun 2022 15:36), Max: 99 (20 Jun 2022 10:10)  HR: 82 (20 Jun 2022 15:36) (76 - 85)  BP: 118/70 (20 Jun 2022 15:36) (111/65 - 126/76)  BP(mean): --  RR: 18 (20 Jun 2022 15:36) (18 - 18)  SpO2: 95% (20 Jun 2022 15:36) (95% - 96%)    LABS:                        7.9    10.84 )-----------( 110      ( 20 Jun 2022 16:32 )             23.8     20 Jun 2022 07:51    142    |  107    |  22     ----------------------------<  108    3.9     |  30     |  0.98     Ca    8.1        20 Jun 2022 07:51          CAPILLARY BLOOD GLUCOSE     Patient is a 88y old  Male who presents with a chief complaint of Fall/Syncope (20 Jun 2022 14:08)      INTERVAL HPI/OVERNIGHT EVENTS:  Patient seen awake and laying in bed. He reports some left knee and R shoulder pain. Endorses some fatigue. Patient had Hb 7.1 this AM and received 1u pRBC. No reported overnight events.    MEDICATIONS  (STANDING):  acetaminophen     Tablet .. 1000 milliGRAM(s) Oral every 8 hours  atorvastatin 20 milliGRAM(s) Oral at bedtime  enoxaparin Injectable 40 milliGRAM(s) SubCutaneous every 24 hours  lactated ringers. 1000 milliLiter(s) (100 mL/Hr) IV Continuous <Continuous>  lactated ringers. 1000 milliLiter(s) (100 mL/Hr) IV Continuous <Continuous>  pantoprazole    Tablet 40 milliGRAM(s) Oral every 12 hours  senna 2 Tablet(s) Oral at bedtime  sucralfate suspension 1 Gram(s) Oral every 6 hours  tamsulosin 0.4 milliGRAM(s) Oral at bedtime    MEDICATIONS  (PRN):  aluminum hydroxide/magnesium hydroxide/simethicone Suspension 30 milliLiter(s) Oral four times a day PRN Indigestion  bisacodyl Suppository 10 milliGRAM(s) Rectal daily PRN If no bowel movement  HYDROmorphone  Injectable 0.5 milliGRAM(s) IV Push every 6 hours PRN Severe Pain (7 - 10)  magnesium hydroxide Suspension 30 milliLiter(s) Oral daily PRN Constipation  melatonin 3 milliGRAM(s) Oral at bedtime PRN Insomnia  morphine  - Injectable 2 milliGRAM(s) IV Push every 6 hours PRN Moderate Pain (4 - 6)  ondansetron Injectable 4 milliGRAM(s) IV Push every 6 hours PRN Nausea and/or Vomiting  oxyCODONE    IR 10 milliGRAM(s) Oral every 3 hours PRN Moderate Pain (4 - 6)  oxyCODONE    IR 5 milliGRAM(s) Oral every 3 hours PRN Mild Pain (1 - 3)  polyethylene glycol 3350 17 Gram(s) Oral daily PRN Constipation      Allergies    No Known Allergies    Intolerances          REVIEW OF SYSTEMS:  CONSTITUTIONAL: No fever, weight loss, or fatigue  EYES: No eye pain, visual disturbances, or discharge  RESPIRATORY: No cough, wheezing, chills or hemoptysis; No shortness of breath  CARDIOVASCULAR: No chest pain, palpitations, lightheadedness, or leg swelling  GASTROINTESTINAL: No abdominal or epigastric pain. No nausea, vomiting, or hematemesis; No diarrhea or constipation. No melena or hematochezia.  GENITOURINARY: No dysuria, frequency, hematuria, or incontinence  NEUROLOGICAL: No headaches, memory loss, vertigo, loss of strength, numbness, or tremors  SKIN: No itching, burning, rashes, or lesions   MUSCULOSKELETAL: R shoulder pain, L leg pain, lower extremity muscle spasms. No joint swelling  PSYCHIATRIC: No depression, anxiety, or mood swings  HEME/LYMPH: No easy bruising, or bleeding gums  ALLERGY AND IMMUNOLOGIC: No hives or eczema    PHYSICAL EXAM:  GENERAL: NAD, well-groomed, well-developed, laying in bed.  HEAD:  Atraumatic, Normocephalic  EYES: EOMI, PERRLA, conjunctiva and sclera clear  ENMT: Moist mucous membranes, Good dentition  NERVOUS SYSTEM:  Alert & Oriented X3, Good concentration; All 4 extremities mobile, no gross sensory deficits.   CHEST/LUNG: Clear to auscultation bilaterally; No rales, rhonchi, wheezing, or rubs appreciated  HEART: Regular rate and rhythm; No murmurs, rubs, or gallops  ABDOMEN: Soft, Nontender, Nondistended  EXTREMITIES:  2+ Peripheral Pulses, No clubbing, cyanosis, or edema appreciated.   LYMPH: No lymphadenopathy noted  SKIN: No rashes or lesions appreciated      Vital Signs Last 24 Hrs  T(C): 36.9 (20 Jun 2022 15:36), Max: 37.2 (20 Jun 2022 10:10)  T(F): 98.5 (20 Jun 2022 15:36), Max: 99 (20 Jun 2022 10:10)  HR: 82 (20 Jun 2022 15:36) (76 - 85)  BP: 118/70 (20 Jun 2022 15:36) (111/65 - 126/76)  BP(mean): --  RR: 18 (20 Jun 2022 15:36) (18 - 18)  SpO2: 95% (20 Jun 2022 15:36) (95% - 96%)    LABS:                        7.9    10.84 )-----------( 110      ( 20 Jun 2022 16:32 )             23.8     20 Jun 2022 07:51    142    |  107    |  22     ----------------------------<  108    3.9     |  30     |  0.98     Ca    8.1        20 Jun 2022 07:51          CAPILLARY BLOOD GLUCOSE

## 2022-06-20 NOTE — DISCHARGE NOTE PROVIDER - NSDCCPCAREPLAN_GEN_ALL_CORE_FT
PRINCIPAL DISCHARGE DIAGNOSIS  Diagnosis: Fracture of left hip  Assessment and Plan of Treatment: ORIF left hip fracture w/ gamma nail  Physical Therapy /Occupational Therapy for: Ambulation, Transfers , Stairs, ADLs (activities of daily living), isometrics.  Ambulate WBAT left leg  • Do not take a tub bath yet.   • Do not resume driving until you have your surgeon’s permission.  You have a silverlon dressing that is water resistant and may get slightly wet in the shower.  Reomve dfressing in 7 days.  As long as there is no drainage, wound may get slightly wet in the shower.  Pat dry gently  Prineo/suture removal in 2 weeks in Dr Bonilla's office or in rehab facility.

## 2022-06-20 NOTE — PROGRESS NOTE ADULT - SUBJECTIVE AND OBJECTIVE BOX
Chief Complaint: Fall    Interval Events: Underwent surgery yesterday.    Review of Systems:  General: No fevers, chills, weight gain  Skin: No rashes, color changes  Cardiovascular: No chest pain, orthopnea  Respiratory: No shortness of breath, cough  Gastrointestinal: No nausea, abdominal pain  Genitourinary: No incontinence, pain with urination  Musculoskeletal: +pain, swelling, decreased range of motion  Neurological: No headache, weakness  Psychiatric: No depression, anxiety  Endocrine: No weight gain, increased thirst  All other systems are comprehensively negative.    Physical Exam:  Vital Signs Last 24 Hrs  T(C): 36.9 (20 Jun 2022 07:35), Max: 37 (19 Jun 2022 15:15)  T(F): 98.5 (20 Jun 2022 07:35), Max: 98.6 (19 Jun 2022 15:15)  HR: 76 (20 Jun 2022 07:35) (76 - 88)  BP: 119/56 (20 Jun 2022 07:35) (105/65 - 119/56)  BP(mean): --  RR: 18 (20 Jun 2022 07:35) (18 - 18)  SpO2: 96% (20 Jun 2022 07:35) (96% - 96%)  General: NAD  HEENT: MMM  Neck: No JVD, no carotid bruit  Lungs: CTAB  CV: RRR, nl S1/S2, no M/R/G  Abdomen: S/NT/ND, +BS  Extremities: No LE edema, no cyanosis  Neuro: AAOx3, non-focal  Skin: No rash    Labs:             06-19    138  |  105  |  19  ----------------------------<  145<H>  4.3   |  28  |  0.86    Ca    8.1<L>      19 Jun 2022 06:31                          8.1    15.52 )-----------( 113      ( 19 Jun 2022 06:31 )             24.5         Telemetry: Sinus rhythm

## 2022-06-20 NOTE — DISCHARGE NOTE PROVIDER - NSDCMRMEDTOKEN_GEN_ALL_CORE_FT
acetaminophen 500 mg oral tablet: 2 tab(s) orally every 8 hours  Aspir 81 oral delayed release tablet: 1 tab(s) orally once a day.  resume after lovenox course is complete  atorvastatin 20 mg oral tablet: 1 tab(s) orally once a day  bisacodyl 10 mg rectal suppository: 1 suppository(ies) rectal once a day, As needed, If no bowel movement  enoxaparin: 40 milligram(s) subcutaneous every 24 hours for 2 weeks  hydroCHLOROthiazide 12.5 mg oral tablet: 1 tab(s) orally every other day  losartan 50 mg oral tablet: 1 tab(s) orally once a day  melatonin 3 mg oral tablet: 1 tab(s) orally once a day (at bedtime), As needed, Insomnia  Metoprolol Succinate ER 25 mg oral tablet, extended release: 1 tab(s) orally once a day  oxyCODONE 10 mg oral tablet: 1 tab(s) orally every 3 hours, As needed, Moderate Pain (4 - 6)  oxyCODONE 5 mg oral tablet: 1 tab(s) orally every 3 hours, As needed, Mild Pain (1 - 3)  pantoprazole 40 mg oral delayed release tablet: 1 tab(s) orally every 12 hours  polyethylene glycol 3350 oral powder for reconstitution: 17 gram(s) orally once a day, As needed, Constipation  senna oral tablet: 2 tab(s) orally once a day (at bedtime)  tamsulosin 0.4 mg oral capsule: 1 cap(s) orally once a day   acetaminophen 500 mg oral tablet: 2 tab(s) orally every 8 hours  Aspir 81 oral delayed release tablet: 1 tab(s) orally once a day.  resume after lovenox course is complete  atorvastatin 20 mg oral tablet: 1 tab(s) orally once a day  bisacodyl 10 mg rectal suppository: 1 suppository(ies) rectal once a day, As needed, If no bowel movement  enoxaparin: 40 milligram(s) subcutaneous every 24 hours for 2 weeks  hydroCHLOROthiazide 12.5 mg oral tablet: 1 tab(s) orally every other day  losartan 50 mg oral tablet: 1 tab(s) orally once a day  melatonin 3 mg oral tablet: 1 tab(s) orally once a day (at bedtime), As needed, Insomnia  Metoprolol Succinate ER 25 mg oral tablet, extended release: 1 tab(s) orally once a day  oxyCODONE 10 mg oral tablet: 1 tab(s) orally every 4 hours, As Needed -for severe pain   oxyCODONE 5 mg oral tablet: 1 tab(s) orally every 4 hours, As Needed - moderate pain  pantoprazole 40 mg oral delayed release tablet: 1 tab(s) orally every 12 hours  polyethylene glycol 3350 oral powder for reconstitution: 17 gram(s) orally once a day, As needed, Constipation  senna oral tablet: 2 tab(s) orally once a day (at bedtime)  sucralfate 1 g/10 mL oral suspension: 10 milliliter(s) orally every 6 hours  tamsulosin 0.4 mg oral capsule: 1 cap(s) orally once a day

## 2022-06-20 NOTE — DISCHARGE NOTE PROVIDER - NSDCCPTREATMENT_GEN_ALL_CORE_FT
PRINCIPAL PROCEDURE  Procedure: Open reduction and internal fixation (ORIF) of left hip using trochanteric shannon  Findings and Treatment: left hip fracture

## 2022-06-20 NOTE — PROGRESS NOTE ADULT - SUBJECTIVE AND OBJECTIVE BOX
Date/Time Patient Seen:  		  Referring MD:   Data Reviewed	       Patient is a 88y old  Male who presents with a chief complaint of Fall/Syncope (19 Jun 2022 18:14)      Subjective/HPI     PAST MEDICAL & SURGICAL HISTORY:  CAD (coronary artery disease)    HTN (hypertension)    HLD (hyperlipidemia)    S/P CABG x 4    History of cholecystectomy          Medication list         MEDICATIONS  (STANDING):  acetaminophen     Tablet .. 1000 milliGRAM(s) Oral every 8 hours  atorvastatin 20 milliGRAM(s) Oral at bedtime  enoxaparin Injectable 40 milliGRAM(s) SubCutaneous every 24 hours  lactated ringers. 1000 milliLiter(s) (100 mL/Hr) IV Continuous <Continuous>  lactated ringers. 1000 milliLiter(s) (100 mL/Hr) IV Continuous <Continuous>  pantoprazole    Tablet 40 milliGRAM(s) Oral every 12 hours  senna 2 Tablet(s) Oral at bedtime  sucralfate suspension 1 Gram(s) Oral every 6 hours  tamsulosin 0.4 milliGRAM(s) Oral at bedtime    MEDICATIONS  (PRN):  aluminum hydroxide/magnesium hydroxide/simethicone Suspension 30 milliLiter(s) Oral four times a day PRN Indigestion  bisacodyl Suppository 10 milliGRAM(s) Rectal daily PRN If no bowel movement  HYDROmorphone  Injectable 0.5 milliGRAM(s) IV Push every 6 hours PRN Severe Pain (7 - 10)  magnesium hydroxide Suspension 30 milliLiter(s) Oral daily PRN Constipation  melatonin 3 milliGRAM(s) Oral at bedtime PRN Insomnia  morphine  - Injectable 2 milliGRAM(s) IV Push every 6 hours PRN Moderate Pain (4 - 6)  ondansetron Injectable 4 milliGRAM(s) IV Push every 6 hours PRN Nausea and/or Vomiting  oxyCODONE    IR 10 milliGRAM(s) Oral every 3 hours PRN Moderate Pain (4 - 6)  oxyCODONE    IR 5 milliGRAM(s) Oral every 3 hours PRN Mild Pain (1 - 3)  polyethylene glycol 3350 17 Gram(s) Oral daily PRN Constipation         Vitals log        ICU Vital Signs Last 24 Hrs  T(C): 37 (19 Jun 2022 23:20), Max: 37.3 (19 Jun 2022 07:54)  T(F): 98.6 (19 Jun 2022 23:20), Max: 99.2 (19 Jun 2022 07:54)  HR: 80 (19 Jun 2022 23:20) (80 - 89)  BP: 105/65 (19 Jun 2022 23:20) (93/58 - 114/62)  BP(mean): --  ABP: --  ABP(mean): --  RR: 18 (19 Jun 2022 23:20) (18 - 18)  SpO2: 96% (19 Jun 2022 23:20) (96% - 96%)           Input and Output:  I&O's Detail    18 Jun 2022 07:01  -  19 Jun 2022 07:00  --------------------------------------------------------  IN:    Lactated Ringers: 1000 mL    Lactated Ringers: 100 mL    Oral Fluid: 120 mL  Total IN: 1220 mL    OUT:    Blood Loss (mL): 50 mL  Total OUT: 50 mL    Total NET: 1170 mL          Lab Data                        8.1    15.52 )-----------( 113      ( 19 Jun 2022 06:31 )             24.5     06-19    138  |  105  |  19  ----------------------------<  145<H>  4.3   |  28  |  0.86    Ca    8.1<L>      19 Jun 2022 06:31    TPro  5.6<L>  /  Alb  3.1<L>  /  TBili  0.9  /  DBili  x   /  AST  16  /  ALT  27  /  AlkPhos  53  06-18            Review of Systems	      Objective     Physical Examination    heart s1s2  lung dec BS  abd soft      Pertinent Lab findings & Imaging      Kyle:  NO   Adequate UO     I&O's Detail    18 Jun 2022 07:01  -  19 Jun 2022 07:00  --------------------------------------------------------  IN:    Lactated Ringers: 1000 mL    Lactated Ringers: 100 mL    Oral Fluid: 120 mL  Total IN: 1220 mL    OUT:    Blood Loss (mL): 50 mL  Total OUT: 50 mL    Total NET: 1170 mL               Discussed with:     Cultures:	        Radiology

## 2022-06-20 NOTE — PROGRESS NOTE ADULT - ASSESSMENT
The patient is an 88 year old male with a history of HTN, HL, BPH, CAD s/p CABG, mild chronic systolic heart failure who is admitted with a hip fracture.    Plan:  - ECG with mild conduction abnormalities but no evidence of ischemia or infarction  - Continue atorvastatin 20 mg daily  - As per patient's outpatient cardiologist Dr. De La Rosa, the patient had a cardiac catheterization 7/20/21 with patent LIMA-LAD, patient SVG-RCA, patient SVG-D and an occluded SVG-OM. EF was 45%.  - Syncope - echo with grossly normal LV systolic function, mild aortic stenosis. No events on telemetry.  - Aspirin on hold - resume when ok from a GI standpoint  - Hold metoprolol and losartan due to low BPs  - Hemoglobin trending down - pending this morning - may need to be transfused The patient is an 88 year old male with a history of HTN, HL, BPH, CAD s/p CABG, mild chronic systolic heart failure who is admitted with a hip fracture.    Plan:  - ECG with mild conduction abnormalities but no evidence of ischemia or infarction  - Continue atorvastatin 20 mg daily  - As per patient's outpatient cardiologist Dr. De La Rosa, the patient had a cardiac catheterization 7/20/21 with patent LIMA-LAD, patient SVG-RCA, patient SVG-D and an occluded SVG-OM. EF was 45%.  - Syncope - echo with grossly normal LV systolic function, mild aortic stenosis. No events on telemetry.  - Aspirin on hold - resume when ok from a GI standpoint  - Hold metoprolol and losartan due to low BPs  - Hemoglobin trending down - pending this morning - may need to be transfused    ADDENDUM:  - Hemoglobin down to 7.1 - recommend transfusion  - Consider holding DVT prophylaxis if signs of active bleeding  - Continue to hold aspirin

## 2022-06-20 NOTE — PROGRESS NOTE ADULT - SUBJECTIVE AND OBJECTIVE BOX
Orthopedics     POD #  s/p ORIF Left Hip   Pain is controlled. Has discomfort when moving Left lower extremity. Pt feeling well. No nausea or vomiting.     Vital Signs Last 24 Hrs  T(C): 36.9 (06-20-22 @ 07:35), Max: 37 (06-19-22 @ 15:15)  T(F): 98.5 (06-20-22 @ 07:35), Max: 98.6 (06-19-22 @ 15:15)  HR: 76 (06-20-22 @ 07:35) (76 - 88)  BP: 119/56 (06-20-22 @ 07:35) (105/65 - 119/56)  RR: 18 (06-20-22 @ 07:35) (18 - 18)  SpO2: 96% (06-20-22 @ 07:35) (96% - 96%)                        7.1    10.91 )-----------( 118      ( 20 Jun 2022 07:51 )             22.2     20 Jun 2022 07:51    142    |  107    |  22     ----------------------------<  108    3.9     |  30     |  0.98     Ca    8.1        20 Jun 2022 07:51          Exam:  NAD AAOx3  Left Hip: [place] Dressing clean, dry and intact  Bilateral LE's:  5/5 plantarflexion/dorsiflexion/EHL  1+DP pulse  Calf Soft, NT    A/P:  Patient with low H&H POD #2  s/p ORIF Left Hip, also with recent h/o GI bleed. Will transfuse 1 unit PRBCs to which patient agrees, and f/u post-transfusion labs  -Pain control  -DVT PE ppx  -OOB PT/OT when H&H improves  -WBAT  - Pulmonary and Cardiology notes appreciated  - DCP KASHIF when medically stable, possibly tomorrow

## 2022-06-20 NOTE — DISCHARGE NOTE PROVIDER - CARE PROVIDER_API CALL
Arturo Bonilla)  [facility]  Physicians  [address] [place] [address]  Phone: [phone]  Fax: [phone]  Follow Up Time: 2 weeks

## 2022-06-20 NOTE — DISCHARGE NOTE PROVIDER - NSDCFUADDAPPT_GEN_ALL_CORE_FT
Please call Dr Bonilla's office for follow up appt in 2 weeks.  It is advisable to follow up w/ your pcp in 2-3 weeks to be sure there are no underlying problems.

## 2022-06-20 NOTE — DISCHARGE NOTE PROVIDER - HOSPITAL COURSE
88 year old male with PMHx CAD s/p CABG x4, HTN, HLD, BPH, presents to the ED after fall at home on 6/16/22 afternoon. Patient was found on the floor in the kitchen, unwitnessed fall, and had landed on his left side. After the fall family helped him up an moved him to the recliner where he laid down in extreme pain. He then passed out from the pain as per wife for about 1 minute. Patient regained consciousness and was brought to the [facility] ED via ambulance.  In the ED, an xray was done and showed a left hip fracture. Patient denies memory of passing out. He also denies fever, chills, cp, sob, abd pain, n/v/d. Patient seen by cardiology and transferred to [facility] on 6/17/22 for further orthopedic workup and management. Per patient, he was nauseous en route from [facility] to ED and was coughing up dark Emesis. He has no hx of GI bleed and denies any hx of same. Patient has been on ASA long term for hx of CAD.  The appropriate medical clearances were obtained.  On 6/18/22 he was brought to the OR after receiving pre-operative parenteral prophylactic antibiotics (ancef), and  underwent an  uncomplicated ORIF left hip w/ gamma nail by orthopedic surgeon Dr. Arturo Bonilla.    A medical consultation from the Hospitalist service was obtained for post-operative medical co-management. Typical Physical & occupational therapy modalities post ORIF hip fracture were performed including ambulation training, range of motion, ADL's, and transfers. Lovenox 40 mg every 24 hrs, along w/ bilat venodynes, was given for DVT prophylaxis.  The patient had a clean appearing surgical incision with no sign of surgical site infections and had a stable neuro / vascular exam of the operated extremity.  After progression of mobility guided by the PT/ OT staff,  the patient was felt to benefit from further rehabilitative care for restoration to level of function. This was felt to best be accomplished at a rehab facility.  Discharge and Orthopedic Care instructions were delineated in the Discharge Plan and reviewed with the patient. All medications were delineated in the medication reconciliation tool and key points were reviewed with the patient. They were deemed stable from an Orthopedic & medical standpoint for discharge today.  Upon  discharge from the rehab facility hewill be  following up with Dr. Bonilla for office  follow up Orthopedic care.       88 year old male with PMHx CAD s/p CABG x4, HTN, HLD, BPH, presents to the ED after fall at home on 6/16/22 afternoon. Patient was found on the floor in the kitchen, unwitnessed fall, and had landed on his left side. After the fall family helped him up an moved him to the recliner where he laid down in extreme pain. He then passed out from the pain as per wife for about 1 minute. Patient regained consciousness and was brought to the [facility] ED via ambulance.  In the ED, an xray was done and showed a left hip fracture. Patient denies memory of passing out. He also denies fever, chills, cp, sob, abd pain, n/v/d. Patient seen by cardiology and transferred to [facility] on 6/17/22 for further orthopedic workup and management. Per patient, he was nauseous en route from [facility] to ED and was coughing up dark Emesis. He has no hx of GI bleed and denies any hx of same. Patient has been on ASA long term for hx of CAD.  The appropriate medical clearances were obtained.  On 6/18/22 he was brought to the OR after receiving pre-operative parenteral prophylactic antibiotics (ancef), and  underwent an  uncomplicated ORIF left hip w/ gamma nail by orthopedic surgeon Dr. Arturo Bonilla.    A medical consultation from the Hospitalist service was obtained for post-operative medical co-management. Typical Physical & occupational therapy modalities post ORIF hip fracture were performed including ambulation training, range of motion, ADL's, and transfers. Lovenox 40 mg every 24 hrs, along w/ bilat venodynes, was given for DVT prophylaxis.  Patient was transfused 1 unit PRBCs on 6/20/22 for low H&H which was well-tolerated.  The patient had a clean appearing surgical incision with no sign of surgical site infections and had a stable neuro / vascular exam of the operated extremity.  After progression of mobility guided by the PT/ OT staff,  the patient was felt to benefit from further rehabilitative care for restoration to level of function. This was felt to best be accomplished at a rehab facility.  Discharge and Orthopedic Care instructions were delineated in the Discharge Plan and reviewed with the patient. All medications were delineated in the medication reconciliation tool and key points were reviewed with the patient. They were deemed stable from an Orthopedic & medical standpoint for discharge today.  Upon  discharge from the rehab facility hewill be  following up with Dr. Bonilla for office  follow up Orthopedic care.

## 2022-06-20 NOTE — PROGRESS NOTE ADULT - ASSESSMENT
88 year old male with PMHx CAD s/p CABG x4, HTN, HLD, BPH, presents to the ED after fall at home yesterday afternoon. Patient was found on the floor in the kitchen, unwitnessed fall, and had landed on his left side    cad  cabg  heme occult positive  BPH  HTN  HLD  eval for GI bleed    left HIP ORIF - POD - 2  serial H and H  vs noted  labs reviewed  GI follow up  on IVF -   on DVT p    ortho eval  cardio eval  cardiac catheterization July 20, 2021 demonstrating patent LIMA to the LAD, SVG to RCA, SVG to diagonal and a closed SVG to OM.  EF 45%. No stenting was performed  GI eval - GI bleed eval   [facility] rx regimen  serial H and H  monitor VS and HD and Sat  I yuri

## 2022-06-20 NOTE — PROGRESS NOTE ADULT - SUBJECTIVE AND OBJECTIVE BOX
Patient is a 88y old  Male who presents with a chief complaint of ? esophgagitis (17 Jun 2022 16:25)    HPI: 88 year old male with PMHx CAD s/p CABG x4, HTN, HLD, BPH, presents to the ED after fall at home yesterday afternoon. Patient was found on the floor in the kitchen, unwitnessed fall, and had landed on his left side. After the fall family helped him up an moved him to the recliner where he laid down in extreme pain. He then passed out from the pain as per wife for about 1 minute. Patient regained consciousness and was brought to the ED. Patient denies memory of passing out. He also denies fever, chills, cp, sob, abd pain, n/v/d. Patient seen by cardiology and transferred to [facility] for further orthopedic workup and management. Per patient, he was nauseous en route from [facility] to ED and was coughing up dark Emesis. He has no hx of GI bleed and denies any hx of same. Patient has been on ASA long term for hx of CAD.  No seizure reported.  Found to have left Hip Fx.    Interval History  -    S/p Left Hip Sx    MEDICATIONS  (STANDING):    acetaminophen     Tablet .. 1000 milliGRAM(s) Oral every 8 hours  atorvastatin 20 milliGRAM(s) Oral at bedtime  enoxaparin Injectable 40 milliGRAM(s) SubCutaneous every 24 hours  lactated ringers. 1000 milliLiter(s) (100 mL/Hr) IV Continuous <Continuous>  lactated ringers. 1000 milliLiter(s) (100 mL/Hr) IV Continuous <Continuous>  pantoprazole    Tablet 40 milliGRAM(s) Oral every 12 hours  senna 2 Tablet(s) Oral at bedtime  sucralfate suspension 1 Gram(s) Oral every 6 hours  tamsulosin 0.4 milliGRAM(s) Oral at bedtime    MEDICATIONS  (PRN):    aluminum hydroxide/magnesium hydroxide/simethicone Suspension 30 milliLiter(s) Oral four times a day PRN Indigestion  bisacodyl Suppository 10 milliGRAM(s) Rectal daily PRN If no bowel movement  HYDROmorphone  Injectable 0.5 milliGRAM(s) IV Push every 6 hours PRN Severe Pain (7 - 10)  magnesium hydroxide Suspension 30 milliLiter(s) Oral daily PRN Constipation  melatonin 3 milliGRAM(s) Oral at bedtime PRN Insomnia  morphine  - Injectable 2 milliGRAM(s) IV Push every 6 hours PRN Moderate Pain (4 - 6)  ondansetron Injectable 4 milliGRAM(s) IV Push every 6 hours PRN Nausea and/or Vomiting  oxyCODONE    IR 10 milliGRAM(s) Oral every 3 hours PRN Moderate Pain (4 - 6)  oxyCODONE    IR 5 milliGRAM(s) Oral every 3 hours PRN Mild Pain (1 - 3)  polyethylene glycol 3350 17 Gram(s) Oral daily PRN Constipation    Allergies    No Known Allergies    PHYSICAL EXAM:    Vital Signs Last 24 Hrs  T(C): 37.1 (06-20-22 @ 13:40), Max: 37.2 (06-20-22 @ 10:10)  T(F): 98.8 (06-20-22 @ 13:40), Max: 99 (06-20-22 @ 10:10)  HR: 85 (06-20-22 @ 13:40) (76 - 88)  BP: 122/71 (06-20-22 @ 13:40) (105/65 - 126/76)  RR: 18 (06-20-22 @ 13:40) (18 - 18)  SpO2: 95% (06-20-22 @ 13:40) (95% - 96%)    GENERAL: NAD, well-groomed, well-developed  HEAD:  Atraumatic, Normocephalic  EYES: EOMI, PERRLA, conjunctiva and sclera clear  NECK: Supple, No JVD    On Neurological Examination:    Mental Status - Pt is alert, awake, oriented X3. Higher functions are intact. Follows commands well and able to answer questions appropriately.    Speech -  Normal. Pt has no aphasia.    Cranial Nerves - Pupils 3 mm equal and reactive to light, extraocular eye movements intact. Pt has no visual field deficit. No facial asymmetry. Tongue - is in midline.    Motor Exam - 4 plus/5 all over, except left LE where he has Left Hip Fx.  S/p Left Hip Sx    Sensory Exam - Pt withdraws all extremities equally on stimulation.    Gait - Not tested.   S/p Left Hip Sx    Deep tendon Reflexes - 2 plus all over.    Coordination - Fine finger movements are normal on both sides. Finger to nose is also normal on both sides.       Neck Supple -  Yes.    LABS:    CBC Full  -  ( 20 Jun 2022 07:51 )  WBC Count : 10.91 K/uL  RBC Count : 2.12 M/uL  Hemoglobin : 7.1 g/dL  Hematocrit : 22.2 %  Platelet Count - Automated : 118 K/uL  Mean Cell Volume : 104.7 fl  Mean Cell Hemoglobin : 33.5 pg  Mean Cell Hemoglobin Concentration : 32.0 gm/dL    06-20    142  |  107  |  22  ----------------------------<  108<H>  3.9   |  30  |  0.98    Ca    8.1<L>      20 Jun 2022 07:51    RADIOLOGY & ADDITIONAL STUDIES:    < from: CT Hip No Cont, Left (06.17.22 @ 08:11) >    Comminuted left intertrochanteric fracture.    < end of copied text >

## 2022-06-20 NOTE — PROGRESS NOTE ADULT - ASSESSMENT
Pt is seen for Fall  -was mechanical fall.   Didn't hit head.   After the fall pt had Brief LOC  No seizure activity is reported.  Non focal exam except left LE   -Has Left Hip Fx.  No signs of CVA.  Likely had Vasovagal syncope.  Ortho follows  - S/p Left Hip Sx   Pain meds.  Continue PT  Fall/safety precautions.  D/w Pt. Questions answered.  DC planning to rehab is in progress.  Would continue to follow.

## 2022-06-20 NOTE — PROGRESS NOTE ADULT - SUBJECTIVE AND OBJECTIVE BOX
CARISSA CUNNINGHAM is a 88yMale , patient examined and chart reviewed.    INTERVAL HPI/ OVERNIGHT EVENTS:   Sp Left hip ORIF pod 2.  Getting PRBC transfusion    PAST MEDICAL & SURGICAL HISTORY:  CAD (coronary artery disease)  HTN (hypertension)  HLD (hyperlipidemia)  S/P CABG x 4  History of cholecystectomy    For details regarding the patient's social history, family history, and other miscellaneous elements, please refer the initial infectious diseases consultation and/or the admitting history and physical examination for this admission.    ROS:  CONSTITUTIONAL:  Negative fever or chills  EYES:  Negative  blurry vision or double vision  CARDIOVASCULAR:  Negative for chest pain or palpitations  RESPIRATORY:  Negative for cough, wheezing, or SOB   GASTROINTESTINAL:  Negative for nausea, vomiting, diarrhea, constipation, or abdominal pain  GENITOURINARY:  Negative frequency, urgency or dysuria  NEUROLOGIC:  No headache, confusion, dizziness, lightheadedness  All other systems were reviewed and are negative         Current inpatient medications :    ANTIBIOTICS/RELEVANT:      acetaminophen     Tablet .. 1000 milliGRAM(s) Oral every 8 hours  aluminum hydroxide/magnesium hydroxide/simethicone Suspension 30 milliLiter(s) Oral four times a day PRN  atorvastatin 20 milliGRAM(s) Oral at bedtime  bisacodyl Suppository 10 milliGRAM(s) Rectal daily PRN  enoxaparin Injectable 40 milliGRAM(s) SubCutaneous every 24 hours  HYDROmorphone  Injectable 0.5 milliGRAM(s) IV Push every 6 hours PRN  lactated ringers. 1000 milliLiter(s) IV Continuous <Continuous>  lactated ringers. 1000 milliLiter(s) IV Continuous <Continuous>  magnesium hydroxide Suspension 30 milliLiter(s) Oral daily PRN  melatonin 3 milliGRAM(s) Oral at bedtime PRN  morphine  - Injectable 2 milliGRAM(s) IV Push every 6 hours PRN  ondansetron Injectable 4 milliGRAM(s) IV Push every 6 hours PRN  oxyCODONE    IR 10 milliGRAM(s) Oral every 3 hours PRN  oxyCODONE    IR 5 milliGRAM(s) Oral every 3 hours PRN  pantoprazole    Tablet 40 milliGRAM(s) Oral every 12 hours  polyethylene glycol 3350 17 Gram(s) Oral daily PRN  senna 2 Tablet(s) Oral at bedtime  sucralfate suspension 1 Gram(s) Oral every 6 hours  tamsulosin 0.4 milliGRAM(s) Oral at bedtime      Objective:  T(C): 36.9 (06-20-22 @ 15:36), Max: 37.2 (06-20-22 @ 10:10)  HR: 82 (06-20-22 @ 15:36) (76 - 85)  BP: 118/70 (06-20-22 @ 15:36) (105/65 - 126/76)  RR: 18 (06-20-22 @ 15:36) (18 - 18)  SpO2: 95% (06-20-22 @ 15:36) (95% - 96%)    Physical Exam:  General: no acute distress  Neck: supple, trachea midline  Lungs: clear, no wheeze/rhonchi  Cardiovascular: regular rate and rhythm, S1 S2  Abdomen: soft, nontender,  bowel sounds normal  Neurological: alert and oriented x3  Skin: no rash  Extremities: no edema  Left hip drsg c/d/i      LABS:                          7.1    10.91 )-----------( 118      ( 20 Jun 2022 07:51 )             22.2       06-20    142  |  107  |  22  ----------------------------<  108<H>  3.9   |  30  |  0.98    Ca    8.1<L>      20 Jun 2022 07:51    MICROBIOLOGY:    Culture - Urine (collected 18 Jun 2022 06:05)  Source: Clean Catch Clean Catch (Midstream)  Final Report (19 Jun 2022 14:12):    >=3 organisms. Probable collection contamination.    Culture - Blood (collected 17 Jun 2022 17:20)  Source: .Blood Blood  Preliminary Report (18 Jun 2022 22:01):    No growth to date.    Culture - Blood (collected 17 Jun 2022 17:20)  Source: .Blood Blood  Preliminary Report (18 Jun 2022 22:01):    No growth to date.      RADIOLOGY & ADDITIONAL STUDIES:  ACC: [id number] EXAM:  XR CHEST PORTABLE URGENT 1V                          PROCEDURE DATE:  06/17/2022          INTERPRETATION:  AP semierect chest on June 17, 2022 at 2:46 PM. Patient   has vomiting.    Heart magnified by technique.    Sternotomy again noted.    There is an increasing left base atelectatic findings.    This is compared to earlier in the day.    IMPRESSION: Increasing left base atelectatic findings.    ACC: [id number] EXAM:  XR CHEST AP OR PA 1V                        ACC: [id number] EXAM:  XR FEMUR 2 VIEWS LT                        ACC: [id number] EXAM:  XR HIP WITH PELV 2-3V LT                          PROCEDURE DATE:  06/17/2022          INTERPRETATION:  Left hip with pelvis, left femur, and chest. Patient had   a fall with local trauma.    Left hip with pelvis. 3 views. 4 images.    There is rather mild symmetric hip degeneration.    There is density in the L4 body suggesting vertebroplasty.    There is a comminuted fracture content fracture of the left hip with   increased angulation at the fracture site.    Left femur. 2 views of the lower left femur show no effusion and moderate   knee degeneration.    AP chest on June 17, 2022 at 1:19 AM.    COMPARISON: None available.    Heart magnified by technique. Sternotomy noted.    Lungs clear. No fracture.    IMPRESSION: Comminuted left hip intertrochanteric fracture.    Assessment :   89YO M PMH CAD s/p CABG x4, HTN, HLD, BPH, admitted sp fall with left hip intertrochanteric fracture sp Left HIP ORIF 6/18/22  Suspect leukocytosis reactive- downtrending  Sp coffee ground emesis x 1  Anemia post op  No clear clinical evidence for infectious process    Plan :   Monitor off antibiotics  Trend temp and cbc  Monitor H/H  Pulm toileting  Asp precautions  Stable from ID standpoint    Continue with present regiment.  Appropriate use of antibiotics and adverse effects reviewed.      I have discussed the above plan of care with patient/ family in detail. They expressed understanding of the  treatment plan . Risks, benefits and alternatives discussed in detail. I have asked if they have any questions or concerns and appropriately addressed them to the best of my ability .    > 35 minutes were spent in direct patient care reviewing notes, medications ,labs data/ imaging , discussion with multidisciplinary team.    Thank you for allowing me to participate in care of your patient .    Stacy Dhaliwal MD  Infectious Disease  [phone]

## 2022-06-20 NOTE — PROGRESS NOTE ADULT - ASSESSMENT
88 year old male with PMHx CAD s/p CABG x4, HTN, HLD, BPH, presented to the ED after fall at home yesterday afternoon admitted for left hip fracture with planned OR with orthopedic surgery.     Fracture of left hip  - s/p ORIF L hip 6/18  - continue pain medication as per ortho  - Fall precautions  - PT eval for post op    Emesis  -Patient was noted to be coughing up coffee ground emesis en route from [facility]  -GI consulted, appreciate reccs  -trend CBC  -PPI, Carafate as per GI    Syncope  Patient reportedly passed out in his recliner after the fall while in extreme pain as per wife   - likely vagal mediated syncope   - regained consciousness after ~1 minute   - patient has no residual deficits   - TSH WNL- Neurology consulted Dr. Nevarez   - Echo findings appear unrelated to episode    Acute Hypoxic respiratory failure  continue supplemental O2, plan to wean  monitor SpO2, goal >92%  anemia may be playing a role    Leukocytosis.   WBC 16.17 on admission likely reactive  - Afebrile, appears non toxic  - s/p cefazolin postop  - Surveillance blood cultures no growth to date    Anemia.   H/H 11.0/33.7 on admission, baseline hemoglobin unknown  - Downtrending Hb postop but currently hemodynamically stable, monitor for signs and symptoms of active bleeding  - Maintain active type and screen  - F/u iron panel: iron, ferritin, TIBC, transferrin  - Folate, vitamin B12 WNL  - Trend CBC.    CAD (coronary artery disease).   s/p CABG x4 2003  - Hold Aspirin for now  - continue statin  - stable, no signs or sx of ACS  - cardiology following    Hypertension  - continue Metoprolol, Losartan   - Monitor routine hemodynamics    Hyperlipidemia  Continue home Atorvastatin 20 mg PO qhs   holding home ASA 81mg for now, plan to resume postop    BPH (benign prostatic hyperplasia).   Continue home Tamsulosin 0.4 mg PO QD    Need for prophylactic measure.   SCD's for now    IMPROVE VTE Individual Risk Assessment          RISK                                                          Points  [  ] Previous VTE                                                3  [  ] Thrombophilia                                             2  [  ] Lower limb paralysis                                   2        (unable to hold up >15 seconds)    [  ] Current Cancer                                             2         (within 6 months)  [  ] Immobilization > 24 hrs                              1  [  ] ICU/CCU stay > 24 hours                             1  [ x ] Age > 60                                                         1    IMPROVE VTE Score: 1.   88 year old male with PMHx CAD s/p CABG x4, HTN, HLD, BPH, presented to the ED after fall at home yesterday afternoon admitted for left hip fracture with planned OR with orthopedic surgery.     Fracture of left hip  - s/p ORIF L hip 6/18  - continue pain medication as per ortho  - Fall precautions  - PT following    Emesis  -Patient was noted to be coughing up coffee ground emesis en route from [facility]. No further episodes  -GI consulted, appreciate reccs  -trend CBC  -PPI, Carafate as per GI    Acute blood loss Anemia  - H/H 11.0/33.7 on admission, baseline hemoglobin unknown  - Downtrending Hb, 7.1 this AM, 1u pRBC ordered, 7.9 on followup  - Maintain active type and screen  - low iron, saturation, transferrin  - Folate, vitamin B12 WNL  - Trend CBC.    Syncope  Patient reportedly passed out in his recliner after the fall while in extreme pain as per wife   - likely vagal mediated syncope, regained consciousness after ~1 minute   - patient has no residual deficits   - TSH WNL- Neurology consulted Dr. Nevarez   - Echo findings appear unrelated to episode    Acute Hypoxic respiratory failure  continue supplemental O2, plan to wean  monitor SpO2, goal >92%  anemia may be playing a role    Leukocytosis.   WBC 16.17 on admission likely reactive  - Afebrile, appears non toxic  - s/p cefazolin postop  - Surveillance blood cultures no growth to date    CAD (coronary artery disease).   s/p CABG x4 2003  - Hold Aspirin for now  - continue statin  - stable, no signs or sx of ACS  - cardiology following    Hypertension  - continue Metoprolol, Losartan   - Monitor routine hemodynamics    Hyperlipidemia  Continue home Atorvastatin 20 mg PO qhs   holding home ASA 81mg for now, plan to resume postop    BPH (benign prostatic hyperplasia).   Continue home Tamsulosin 0.4 mg PO QD    Need for prophylactic measure.   SCD's for now    IMPROVE VTE Individual Risk Assessment          RISK                                                          Points  [  ] Previous VTE                                                3  [  ] Thrombophilia                                             2  [  ] Lower limb paralysis                                   2        (unable to hold up >15 seconds)    [  ] Current Cancer                                             2         (within 6 months)  [  ] Immobilization > 24 hrs                              1  [  ] ICU/CCU stay > 24 hours                             1  [ x ] Age > 60                                                         1    IMPROVE VTE Score: 1.

## 2022-06-21 VITALS
TEMPERATURE: 100 F | DIASTOLIC BLOOD PRESSURE: 64 MMHG | SYSTOLIC BLOOD PRESSURE: 114 MMHG | HEART RATE: 91 BPM | OXYGEN SATURATION: 93 %

## 2022-06-21 LAB
ANION GAP SERPL CALC-SCNC: 5 MMOL/L — SIGNIFICANT CHANGE UP (ref 5–17)
BUN SERPL-MCNC: 17 MG/DL — SIGNIFICANT CHANGE UP (ref 7–23)
CALCIUM SERPL-MCNC: 8.5 MG/DL — SIGNIFICANT CHANGE UP (ref 8.4–10.5)
CHLORIDE SERPL-SCNC: 105 MMOL/L — SIGNIFICANT CHANGE UP (ref 96–108)
CO2 SERPL-SCNC: 29 MMOL/L — SIGNIFICANT CHANGE UP (ref 22–31)
CREAT SERPL-MCNC: 0.8 MG/DL — SIGNIFICANT CHANGE UP (ref 0.5–1.3)
EGFR: 85 ML/MIN/1.73M2 — SIGNIFICANT CHANGE UP
GLUCOSE SERPL-MCNC: 118 MG/DL — HIGH (ref 70–99)
HCT VFR BLD CALC: 26.1 % — LOW (ref 39–50)
HGB BLD-MCNC: 8.7 G/DL — LOW (ref 13–17)
MCHC RBC-ENTMCNC: 33.3 GM/DL — SIGNIFICANT CHANGE UP (ref 32–36)
MCHC RBC-ENTMCNC: 33.6 PG — SIGNIFICANT CHANGE UP (ref 27–34)
MCV RBC AUTO: 100.8 FL — HIGH (ref 80–100)
NRBC # BLD: 0 /100 WBCS — SIGNIFICANT CHANGE UP (ref 0–0)
PLATELET # BLD AUTO: 141 K/UL — LOW (ref 150–400)
POTASSIUM SERPL-MCNC: 3.8 MMOL/L — SIGNIFICANT CHANGE UP (ref 3.5–5.3)
POTASSIUM SERPL-SCNC: 3.8 MMOL/L — SIGNIFICANT CHANGE UP (ref 3.5–5.3)
RBC # BLD: 2.59 M/UL — LOW (ref 4.2–5.8)
RBC # FLD: 14.9 % — HIGH (ref 10.3–14.5)
SODIUM SERPL-SCNC: 139 MMOL/L — SIGNIFICANT CHANGE UP (ref 135–145)
WBC # BLD: 10.45 K/UL — SIGNIFICANT CHANGE UP (ref 3.8–10.5)
WBC # FLD AUTO: 10.45 K/UL — SIGNIFICANT CHANGE UP (ref 3.8–10.5)

## 2022-06-21 PROCEDURE — 97161 PT EVAL LOW COMPLEX 20 MIN: CPT

## 2022-06-21 PROCEDURE — 87040 BLOOD CULTURE FOR BACTERIA: CPT

## 2022-06-21 PROCEDURE — C1889: CPT

## 2022-06-21 PROCEDURE — 80053 COMPREHEN METABOLIC PANEL: CPT

## 2022-06-21 PROCEDURE — 86850 RBC ANTIBODY SCREEN: CPT

## 2022-06-21 PROCEDURE — P9016: CPT

## 2022-06-21 PROCEDURE — 85014 HEMATOCRIT: CPT

## 2022-06-21 PROCEDURE — 85027 COMPLETE CBC AUTOMATED: CPT

## 2022-06-21 PROCEDURE — 80048 BASIC METABOLIC PNL TOTAL CA: CPT

## 2022-06-21 PROCEDURE — 97530 THERAPEUTIC ACTIVITIES: CPT

## 2022-06-21 PROCEDURE — 97165 OT EVAL LOW COMPLEX 30 MIN: CPT

## 2022-06-21 PROCEDURE — 85610 PROTHROMBIN TIME: CPT

## 2022-06-21 PROCEDURE — 99238 HOSP IP/OBS DSCHRG MGMT 30/<: CPT

## 2022-06-21 PROCEDURE — 86901 BLOOD TYPING SEROLOGIC RH(D): CPT

## 2022-06-21 PROCEDURE — 36430 TRANSFUSION BLD/BLD COMPNT: CPT

## 2022-06-21 PROCEDURE — 87635 SARS-COV-2 COVID-19 AMP PRB: CPT

## 2022-06-21 PROCEDURE — 85018 HEMOGLOBIN: CPT

## 2022-06-21 PROCEDURE — 93306 TTE W/DOPPLER COMPLETE: CPT

## 2022-06-21 PROCEDURE — 99222 1ST HOSP IP/OBS MODERATE 55: CPT

## 2022-06-21 PROCEDURE — 87086 URINE CULTURE/COLONY COUNT: CPT

## 2022-06-21 PROCEDURE — 94664 DEMO&/EVAL PT USE INHALER: CPT

## 2022-06-21 PROCEDURE — C1713: CPT

## 2022-06-21 PROCEDURE — 71045 X-RAY EXAM CHEST 1 VIEW: CPT

## 2022-06-21 PROCEDURE — 97116 GAIT TRAINING THERAPY: CPT

## 2022-06-21 PROCEDURE — 81003 URINALYSIS AUTO W/O SCOPE: CPT

## 2022-06-21 PROCEDURE — 85025 COMPLETE CBC W/AUTO DIFF WBC: CPT

## 2022-06-21 PROCEDURE — 76000 FLUOROSCOPY <1 HR PHYS/QHP: CPT

## 2022-06-21 PROCEDURE — 82272 OCCULT BLD FECES 1-3 TESTS: CPT

## 2022-06-21 PROCEDURE — 86900 BLOOD TYPING SEROLOGIC ABO: CPT

## 2022-06-21 PROCEDURE — 36415 COLL VENOUS BLD VENIPUNCTURE: CPT

## 2022-06-21 PROCEDURE — 86923 COMPATIBILITY TEST ELECTRIC: CPT

## 2022-06-21 RX ORDER — SUCRALFATE 1 G
10 TABLET ORAL
Qty: 0 | Refills: 0 | DISCHARGE
Start: 2022-06-21

## 2022-06-21 RX ORDER — LOSARTAN POTASSIUM 100 MG/1
12.5 TABLET, FILM COATED ORAL DAILY
Refills: 0 | Status: DISCONTINUED | OUTPATIENT
Start: 2022-06-21 | End: 2022-06-21

## 2022-06-21 RX ORDER — METOPROLOL TARTRATE 50 MG
12.5 TABLET ORAL DAILY
Refills: 0 | Status: DISCONTINUED | OUTPATIENT
Start: 2022-06-21 | End: 2022-06-21

## 2022-06-21 RX ADMIN — LOSARTAN POTASSIUM 12.5 MILLIGRAM(S): 100 TABLET, FILM COATED ORAL at 09:21

## 2022-06-21 RX ADMIN — Medication 1 GRAM(S): at 06:03

## 2022-06-21 RX ADMIN — Medication 1000 MILLIGRAM(S): at 06:03

## 2022-06-21 RX ADMIN — ENOXAPARIN SODIUM 40 MILLIGRAM(S): 100 INJECTION SUBCUTANEOUS at 06:03

## 2022-06-21 RX ADMIN — OXYCODONE HYDROCHLORIDE 10 MILLIGRAM(S): 5 TABLET ORAL at 11:10

## 2022-06-21 RX ADMIN — Medication 1000 MILLIGRAM(S): at 06:10

## 2022-06-21 RX ADMIN — PANTOPRAZOLE SODIUM 40 MILLIGRAM(S): 20 TABLET, DELAYED RELEASE ORAL at 06:03

## 2022-06-21 RX ADMIN — Medication 1000 MILLIGRAM(S): at 14:11

## 2022-06-21 RX ADMIN — OXYCODONE HYDROCHLORIDE 10 MILLIGRAM(S): 5 TABLET ORAL at 11:40

## 2022-06-21 RX ADMIN — Medication 1000 MILLIGRAM(S): at 14:16

## 2022-06-21 RX ADMIN — Medication 12.5 MILLIGRAM(S): at 09:29

## 2022-06-21 RX ADMIN — Medication 1 GRAM(S): at 11:42

## 2022-06-21 NOTE — PROGRESS NOTE ADULT - SUBJECTIVE AND OBJECTIVE BOX
Chief Complaint: Fall    Interval Events: No events overnight.    Review of Systems:  General: No fevers, chills, weight gain  Skin: No rashes, color changes  Cardiovascular: No chest pain, orthopnea  Respiratory: No shortness of breath, cough  Gastrointestinal: No nausea, abdominal pain  Genitourinary: No incontinence, pain with urination  Musculoskeletal: +pain, swelling, decreased range of motion  Neurological: No headache, weakness  Psychiatric: No depression, anxiety  Endocrine: No weight gain, increased thirst  All other systems are comprehensively negative.    Physical Exam:  Vital Signs Last 24 Hrs  T(C): 36.8 (21 Jun 2022 07:40), Max: 37.2 (20 Jun 2022 10:10)  T(F): 98.3 (21 Jun 2022 07:40), Max: 99 (20 Jun 2022 10:10)  HR: 86 (21 Jun 2022 07:40) (76 - 86)  BP: 116/66 (21 Jun 2022 07:40) (111/65 - 133/73)  BP(mean): --  RR: 18 (21 Jun 2022 07:40) (18 - 18)  SpO2: 94% (21 Jun 2022 07:40) (94% - 98%)  General: NAD  HEENT: MMM  Neck: No JVD, no carotid bruit  Lungs: CTAB  CV: RRR, nl S1/S2, no M/R/G  Abdomen: S/NT/ND, +BS  Extremities: No LE edema, no cyanosis  Neuro: AAOx3, non-focal  Skin: No rash    Labs:             06-21    139  |  105  |  17  ----------------------------<  118<H>  3.8   |  29  |  0.80    Ca    8.5      21 Jun 2022 06:00                          8.7    10.45 )-----------( 141      ( 21 Jun 2022 08:30 )             26.1       Telemetry: Sinus rhythm

## 2022-06-21 NOTE — PROGRESS NOTE ADULT - ASSESSMENT
The patient is an 88 year old male with a history of HTN, HL, BPH, CAD s/p CABG, mild chronic systolic heart failure who is admitted with a hip fracture.    Plan:  - ECG with mild conduction abnormalities but no evidence of ischemia or infarction  - Continue atorvastatin 20 mg daily  - As per patient's outpatient cardiologist Dr. De La Rosa, the patient had a cardiac catheterization 7/20/21 with patent LIMA-LAD, patient SVG-RCA, patient SVG-D and an occluded SVG-OM. EF was 45%.  - Syncope - echo with grossly normal LV systolic function, mild aortic stenosis. No events on telemetry.  - Aspirin on hold - resume when ok from a GI standpoint  - Resume losartan at 12.5 mg daily  - Resume metoprolol succinate at 12.5 mg daily  - Hemoglobin improved to 7.9 after transfusion

## 2022-06-21 NOTE — DISCHARGE NOTE NURSING/CASE MANAGEMENT/SOCIAL WORK - NSDCPEFALRISK_GEN_ALL_CORE
For information on Fall & Injury Prevention, visit: [email or web address] OR  [email or web address] OR  https://www.cdc.gov/steadi/patient.html

## 2022-06-21 NOTE — CONSULT NOTE ADULT - SUBJECTIVE AND OBJECTIVE BOX
HPI:  88 year old male with PMHx CAD s/p CABG x4, HTN, HLD, BPH, presents to the ED after fall at home yesterday afternoon. Patient was found on the floor in the kitchen, unwitnessed fall, and had landed on his left side. After the fall family helped him up an moved him to the recliner where he laid down in extreme pain. He then passed out from the pain as per wife for about 1 minute. Patient regained consciousness and was brought to the ED. Patient denies memory of passing out. He also denies fever, chills, cp, sob, abd pain, n/v/d. Patient seen by cardiology and transferred to [facility] for further orthopedic workup and management. Per patient, he was nauseous en route [facility] to [facility] and was coughing up dark Emesis. He has no hx of GI bleed and denies any hx of same. Patient has been on ASA long term for hx of CAD     (17 Jun 2022 13:30)      PAST MEDICAL & SURGICAL HISTORY:  CAD (coronary artery disease)      HTN (hypertension)      HLD (hyperlipidemia)      S/P CABG x 4      History of cholecystectomy      REVIEW OF SYSTEMS  CONSTITUTIONAL: +weakness, no fevers or chills  HEENT: denies blurred visions, sore throat  SKIN: denies new lesions, rash  CARDIOVASCULAR: Denies chest pain, palpitations  RESPIRATORY: denies shortness of breath, sputum production  GASTROINTESTINAL: denies nausea, vomiting, diarrhea, abdominal pain  all other ROS is negative unless indicated above        MEDICATIONS  (STANDING):  acetaminophen     Tablet .. 1000 milliGRAM(s) Oral every 8 hours  atorvastatin 20 milliGRAM(s) Oral at bedtime  enoxaparin Injectable 40 milliGRAM(s) SubCutaneous every 24 hours  lactated ringers. 1000 milliLiter(s) (100 mL/Hr) IV Continuous <Continuous>  lactated ringers. 1000 milliLiter(s) (100 mL/Hr) IV Continuous <Continuous>  losartan 12.5 milliGRAM(s) Oral daily  metoprolol succinate ER 12.5 milliGRAM(s) Oral daily  pantoprazole    Tablet 40 milliGRAM(s) Oral every 12 hours  senna 2 Tablet(s) Oral at bedtime  sucralfate suspension 1 Gram(s) Oral every 6 hours  tamsulosin 0.4 milliGRAM(s) Oral at bedtime    MEDICATIONS  (PRN):  aluminum hydroxide/magnesium hydroxide/simethicone Suspension 30 milliLiter(s) Oral four times a day PRN Indigestion  bisacodyl Suppository 10 milliGRAM(s) Rectal daily PRN If no bowel movement  HYDROmorphone  Injectable 0.5 milliGRAM(s) IV Push every 6 hours PRN Severe Pain (7 - 10)  magnesium hydroxide Suspension 30 milliLiter(s) Oral daily PRN Constipation  melatonin 3 milliGRAM(s) Oral at bedtime PRN Insomnia  morphine  - Injectable 2 milliGRAM(s) IV Push every 6 hours PRN Moderate Pain (4 - 6)  ondansetron Injectable 4 milliGRAM(s) IV Push every 6 hours PRN Nausea and/or Vomiting  oxyCODONE    IR 10 milliGRAM(s) Oral every 3 hours PRN Moderate Pain (4 - 6)  oxyCODONE    IR 5 milliGRAM(s) Oral every 3 hours PRN Mild Pain (1 - 3)  polyethylene glycol 3350 17 Gram(s) Oral daily PRN Constipation      Allergies    No Known Allergies    Intolerances        SOCIAL HISTORY:  /    FAMILY HISTORY:  No pertinent family history in first degree relatives        Vital Signs Last 24 Hrs  T(C): 36.8 (21 Jun 2022 07:40), Max: 37.2 (20 Jun 2022 10:10)  T(F): 98.3 (21 Jun 2022 07:40), Max: 99 (20 Jun 2022 10:10)  HR: 86 (21 Jun 2022 07:40) (76 - 86)  BP: 116/66 (21 Jun 2022 07:40) (111/65 - 133/73)  BP(mean): --  RR: 18 (21 Jun 2022 07:40) (18 - 18)  SpO2: 94% (21 Jun 2022 07:40) (94% - 98%)    NAD /   A&Ox3/ Alert/ Obese  Total Care Sport/ Versa Care P500 bed                            8.7    10.45 )-----------( 141      ( 21 Jun 2022 08:30 )             26.1     06-21    139  |  105  |  17  ----------------------------<  118<H>  3.8   |  29  |  0.80    Ca    8.5      21 Jun 2022 06:00        A1C with Estimated Average Glucose Result: 5.7 % (06-17-22 @ 08:58)      PHYSICAL EXAM:    General: resting comfortably in bed; NAD  ENT: no nasal discharge; hearing intact  Extremities: no clubbing or cyanosis; no gross deformities, able to move all four extremities,   Dermatologic: skin warm, friction and shear injury bilateral buttocks  Neurologic: awake, alert,  Can follow commands,

## 2022-06-21 NOTE — OCCUPATIONAL THERAPY INITIAL EVALUATION ADULT - ADL RETRAINING, OT EVAL
Goal: Pt will complete LB dressing with modified independence by 1-2 weeks. Goal: Pt will complete grooming activity while standing at the sink with modified independence by 1-2 weeks.

## 2022-06-21 NOTE — PROGRESS NOTE ADULT - SUBJECTIVE AND OBJECTIVE BOX
Patient is a 88y old  Male who presents with a chief complaint of Fall/Syncope (21 Jun 2022 12:28)      INTERVAL HPI/OVERNIGHT EVENTS:    MEDICATIONS  (STANDING):  acetaminophen     Tablet .. 1000 milliGRAM(s) Oral every 8 hours  atorvastatin 20 milliGRAM(s) Oral at bedtime  enoxaparin Injectable 40 milliGRAM(s) SubCutaneous every 24 hours  lactated ringers. 1000 milliLiter(s) (100 mL/Hr) IV Continuous <Continuous>  lactated ringers. 1000 milliLiter(s) (100 mL/Hr) IV Continuous <Continuous>  losartan 12.5 milliGRAM(s) Oral daily  metoprolol succinate ER 12.5 milliGRAM(s) Oral daily  pantoprazole    Tablet 40 milliGRAM(s) Oral every 12 hours  senna 2 Tablet(s) Oral at bedtime  sucralfate suspension 1 Gram(s) Oral every 6 hours  tamsulosin 0.4 milliGRAM(s) Oral at bedtime    MEDICATIONS  (PRN):  aluminum hydroxide/magnesium hydroxide/simethicone Suspension 30 milliLiter(s) Oral four times a day PRN Indigestion  bisacodyl Suppository 10 milliGRAM(s) Rectal daily PRN If no bowel movement  HYDROmorphone  Injectable 0.5 milliGRAM(s) IV Push every 6 hours PRN Severe Pain (7 - 10)  magnesium hydroxide Suspension 30 milliLiter(s) Oral daily PRN Constipation  melatonin 3 milliGRAM(s) Oral at bedtime PRN Insomnia  morphine  - Injectable 2 milliGRAM(s) IV Push every 6 hours PRN Moderate Pain (4 - 6)  ondansetron Injectable 4 milliGRAM(s) IV Push every 6 hours PRN Nausea and/or Vomiting  oxyCODONE    IR 10 milliGRAM(s) Oral every 3 hours PRN Moderate Pain (4 - 6)  oxyCODONE    IR 5 milliGRAM(s) Oral every 3 hours PRN Mild Pain (1 - 3)  polyethylene glycol 3350 17 Gram(s) Oral daily PRN Constipation      Allergies    No Known Allergies    Intolerances        REVIEW OF SYSTEMS:  CONSTITUTIONAL: No fever, weight loss, or fatigue  EYES: No eye pain, visual disturbances, or discharge  ENMT:  No difficulty hearing, tinnitus, vertigo; No sinus or throat pain  NECK: No pain or stiffness  RESPIRATORY: No cough, wheezing, chills or hemoptysis; No shortness of breath  CARDIOVASCULAR: No chest pain, palpitations, lightheadedness, or leg swelling  GASTROINTESTINAL: No abdominal or epigastric pain. No nausea, vomiting, or hematemesis; No diarrhea or constipation. No melena or hematochezia.  GENITOURINARY: No dysuria, frequency, hematuria, or incontinence  NEUROLOGICAL: No headaches, memory loss, vertigo, loss of strength, numbness, or tremors  SKIN: No itching, burning, rashes, or lesions   LYMPH NODES: No enlarged glands  ENDOCRINE: No heat or cold intolerance; No hair loss; No polydipsia or polyuria  MUSCULOSKELETAL: No joint pain or swelling; No muscle, back, or extremity pain  PSYCHIATRIC: No depression, anxiety, or mood swings  HEME/LYMPH: No easy bruising, or bleeding gums  ALLERGY AND IMMUNOLOGIC: No hives or eczema    PHYSICAL EXAM:  GENERAL: NAD, well-groomed, well-developed  HEAD:  Atraumatic, Normocephalic  EYES: EOMI, PERRLA, conjunctiva and sclera clear  ENMT: Moist mucous membranes, Good dentition, No lesions  NECK: Supple, No JVD appreciated  NERVOUS SYSTEM:  Alert & Oriented X3, Good concentration; All 4 extremities mobile, no gross sensory deficits.   CHEST/LUNG: Clear to auscultation bilaterally; No rales, rhonchi, wheezing, or rubs appreciated  HEART: Regular rate and rhythm; No murmurs, rubs, or gallops  ABDOMEN: Soft, Nontender, Nondistended; Bowel sounds present  EXTREMITIES:  2+ Peripheral Pulses, No clubbing, cyanosis, or edema  LYMPH: No lymphadenopathy noted  SKIN: No rashes or lesions    Vital Signs Last 24 Hrs  T(C): 37.5 (21 Jun 2022 16:11), Max: 37.5 (21 Jun 2022 16:11)  T(F): 99.5 (21 Jun 2022 16:11), Max: 99.5 (21 Jun 2022 16:11)  HR: 91 (21 Jun 2022 16:11) (76 - 91)  BP: 114/64 (21 Jun 2022 16:11) (114/64 - 133/73)  BP(mean): --  RR: 18 (21 Jun 2022 07:40) (18 - 18)  SpO2: 93% (21 Jun 2022 16:11) (93% - 98%)    LABS:                        8.7    10.45 )-----------( 141      ( 21 Jun 2022 08:30 )             26.1     21 Jun 2022 06:00    139    |  105    |  17     ----------------------------<  118    3.8     |  29     |  0.80     Ca    8.5        21 Jun 2022 06:00          CAPILLARY BLOOD GLUCOSE          RADIOLOGY & ADDITIONAL TESTS:    Imaging Personally Reviewed:  [ ] YES     Consultant(s) Notes Reviewed:      Care Discussed with Consultants/Other Providers:    Advanced Directives: [ ] DNR  [ ] No feeding tube  [ ] MOLST in chart  [ ] MOLST completed today  [ ] Unknown   Patient is a 88y old  Male who presents with a chief complaint of Fall/Syncope (21 Jun 2022 12:28)      INTERVAL HPI/OVERNIGHT EVENTS:  Patient seen awake and laying in bed. He reports knee pain has improved. Denies chest or abdominal pain, denies fever. No reported overnight events.    MEDICATIONS  (STANDING):  acetaminophen     Tablet .. 1000 milliGRAM(s) Oral every 8 hours  atorvastatin 20 milliGRAM(s) Oral at bedtime  enoxaparin Injectable 40 milliGRAM(s) SubCutaneous every 24 hours  lactated ringers. 1000 milliLiter(s) (100 mL/Hr) IV Continuous <Continuous>  lactated ringers. 1000 milliLiter(s) (100 mL/Hr) IV Continuous <Continuous>  losartan 12.5 milliGRAM(s) Oral daily  metoprolol succinate ER 12.5 milliGRAM(s) Oral daily  pantoprazole    Tablet 40 milliGRAM(s) Oral every 12 hours  senna 2 Tablet(s) Oral at bedtime  sucralfate suspension 1 Gram(s) Oral every 6 hours  tamsulosin 0.4 milliGRAM(s) Oral at bedtime    MEDICATIONS  (PRN):  aluminum hydroxide/magnesium hydroxide/simethicone Suspension 30 milliLiter(s) Oral four times a day PRN Indigestion  bisacodyl Suppository 10 milliGRAM(s) Rectal daily PRN If no bowel movement  HYDROmorphone  Injectable 0.5 milliGRAM(s) IV Push every 6 hours PRN Severe Pain (7 - 10)  magnesium hydroxide Suspension 30 milliLiter(s) Oral daily PRN Constipation  melatonin 3 milliGRAM(s) Oral at bedtime PRN Insomnia  morphine  - Injectable 2 milliGRAM(s) IV Push every 6 hours PRN Moderate Pain (4 - 6)  ondansetron Injectable 4 milliGRAM(s) IV Push every 6 hours PRN Nausea and/or Vomiting  oxyCODONE    IR 10 milliGRAM(s) Oral every 3 hours PRN Moderate Pain (4 - 6)  oxyCODONE    IR 5 milliGRAM(s) Oral every 3 hours PRN Mild Pain (1 - 3)  polyethylene glycol 3350 17 Gram(s) Oral daily PRN Constipation      Allergies    No Known Allergies    Intolerances        REVIEW OF SYSTEMS:  CONSTITUTIONAL: No fever, weight loss, or fatigue  EYES: No eye pain, visual disturbances, or discharge  RESPIRATORY: No cough, wheezing, chills or hemoptysis; No shortness of breath  CARDIOVASCULAR: No chest pain, palpitations, lightheadedness, or leg swelling  GASTROINTESTINAL: No abdominal or epigastric pain. No nausea, vomiting, or hematemesis; No diarrhea or constipation. No melena or hematochezia.  GENITOURINARY: No dysuria, frequency, hematuria, or incontinence  NEUROLOGICAL: No headaches, memory loss, vertigo, loss of strength, numbness, or tremors  SKIN: No itching, burning, rashes, or lesions   MUSCULOSKELETAL: R shoulder pain, L leg pain, lower extremity muscle spasms. No joint swelling  PSYCHIATRIC: No depression, anxiety, or mood swings  HEME/LYMPH: No easy bruising, or bleeding gums  ALLERGY AND IMMUNOLOGIC: No hives or eczema    PHYSICAL EXAM:  GENERAL: NAD, well-groomed, well-developed, laying in bed.  HEAD:  Atraumatic, Normocephalic  EYES: EOMI, PERRLA, conjunctiva and sclera clear  ENMT: Moist mucous membranes, Good dentition  NERVOUS SYSTEM:  Alert & Oriented X3, Good concentration; All 4 extremities mobile, no gross sensory deficits.   CHEST/LUNG: Clear to auscultation bilaterally; No rales, rhonchi, wheezing, or rubs appreciated  HEART: Regular rate and rhythm; No murmurs, rubs, or gallops  ABDOMEN: Soft, Nontender, Nondistended  EXTREMITIES:  2+ Peripheral Pulses, No clubbing, cyanosis, or edema appreciated.   LYMPH: No lymphadenopathy noted  SKIN: No rashes or lesions appreciated          Vital Signs Last 24 Hrs  T(C): 37.5 (21 Jun 2022 16:11), Max: 37.5 (21 Jun 2022 16:11)  T(F): 99.5 (21 Jun 2022 16:11), Max: 99.5 (21 Jun 2022 16:11)  HR: 91 (21 Jun 2022 16:11) (76 - 91)  BP: 114/64 (21 Jun 2022 16:11) (114/64 - 133/73)  BP(mean): --  RR: 18 (21 Jun 2022 07:40) (18 - 18)  SpO2: 93% (21 Jun 2022 16:11) (93% - 98%)    LABS:                        8.7    10.45 )-----------( 141      ( 21 Jun 2022 08:30 )             26.1     21 Jun 2022 06:00    139    |  105    |  17     ----------------------------<  118    3.8     |  29     |  0.80     Ca    8.5        21 Jun 2022 06:00          CAPILLARY BLOOD GLUCOSE

## 2022-06-21 NOTE — PROGRESS NOTE ADULT - ASSESSMENT
88 year old male with PMHx CAD s/p CABG x4, HTN, HLD, BPH, presented to the ED after fall at home yesterday afternoon admitted for left hip fracture with planned OR with orthopedic surgery.     Fracture of left hip  - s/p ORIF L hip 6/18  - continue pain medication as per ortho  - Fall precautions  - PT following    Emesis  -Patient was noted to be coughing up coffee ground emesis en route from [facility]. No further episodes  -GI consulted, appreciate reccs  -trend CBC  -PPI, Carafate as per GI    Acute blood loss Anemia  - H/H 11.0/33.7 on admission, baseline hemoglobin unknown  - Downtrending Hb, 7.1 this AM, 1u pRBC ordered, 7.9 on followup  - Maintain active type and screen  - low iron, saturation, transferrin  - Folate, vitamin B12 WNL  - Trend CBC.    Syncope  Patient reportedly passed out in his recliner after the fall while in extreme pain as per wife   - likely vagal mediated syncope, regained consciousness after ~1 minute   - patient has no residual deficits   - TSH WNL- Neurology consulted Dr. Nevarez   - Echo findings appear unrelated to episode    Acute Hypoxic respiratory failure  continue supplemental O2, plan to wean  monitor SpO2, goal >92%  anemia may be playing a role    Leukocytosis.   WBC 16.17 on admission likely reactive  - Afebrile, appears non toxic  - s/p cefazolin postop  - Surveillance blood cultures no growth to date    CAD (coronary artery disease).   s/p CABG x4 2003  - Hold Aspirin for now  - continue statin  - stable, no signs or sx of ACS  - cardiology following    Hypertension  - continue Metoprolol, Losartan   - Monitor routine hemodynamics    Hyperlipidemia  Continue home Atorvastatin 20 mg PO qhs   holding home ASA 81mg for now, plan to resume postop    BPH (benign prostatic hyperplasia).   Continue home Tamsulosin 0.4 mg PO QD    Need for prophylactic measure.   SCD's for now    IMPROVE VTE Individual Risk Assessment          RISK                                                          Points  [  ] Previous VTE                                                3  [  ] Thrombophilia                                             2  [  ] Lower limb paralysis                                   2        (unable to hold up >15 seconds)    [  ] Current Cancer                                             2         (within 6 months)  [  ] Immobilization > 24 hrs                              1  [  ] ICU/CCU stay > 24 hours                             1  [ x ] Age > 60                                                         1    IMPROVE VTE Score: 1.   88 year old male with PMHx CAD s/p CABG x4, HTN, HLD, BPH, presented to the ED after fall at home yesterday afternoon admitted for left hip fracture with planned OR with orthopedic surgery.     Fracture of left hip  - s/p ORIF L hip 6/18  - continue pain medication as per ortho  - Fall precautions  - PT following    Emesis  -Patient was noted to be coughing up coffee ground emesis en route from [facility]. No further episodes  -GI consulted, appreciate reccs  -trending CBC, appears stable post-transfusion  -PPI, Carafate as per GI    Acute blood loss Anemia  - H/H 11.0/33.7 on admission, baseline hemoglobin unknown  - s/p 1u pRBC, appears stable and no longer downtrending  - Maintain active type and screen  - low iron, saturation, transferrin  - Folate, vitamin B12 WNL  - Trend CBC.    Syncope  Patient reportedly passed out in his recliner after the fall while in extreme pain as per wife   - likely vagal mediated syncope, regained consciousness after ~1 minute   - patient has no residual deficits   - TSH WNL- Neurology consulted Dr. Nevarez   - Echo findings appear unrelated to episode    Acute Hypoxic respiratory failure  continue supplemental O2, plan to wean  monitor SpO2, goal >92%  anemia may be playing a role    Leukocytosis.   WBC 16.17 on admission likely reactive  - Afebrile, appears non toxic  - s/p cefazolin postop  - Surveillance blood cultures no growth to date    CAD (coronary artery disease).   s/p CABG x4 2003  - Hold Aspirin for now  - continue statin  - stable, no signs or sx of ACS  - cardiology following    Hypertension  - continue Metoprolol, Losartan   - Monitor routine hemodynamics    Hyperlipidemia  Continue home Atorvastatin 20 mg PO qhs   holding home ASA 81mg for now, plan to resume postop    BPH (benign prostatic hyperplasia).   Continue home Tamsulosin 0.4 mg PO QD    Need for prophylactic measure.   SCD's for now    IMPROVE VTE Individual Risk Assessment          RISK                                                          Points  [  ] Previous VTE                                                3  [  ] Thrombophilia                                             2  [  ] Lower limb paralysis                                   2        (unable to hold up >15 seconds)    [  ] Current Cancer                                             2         (within 6 months)  [  ] Immobilization > 24 hrs                              1  [  ] ICU/CCU stay > 24 hours                             1  [ x ] Age > 60                                                         1    IMPROVE VTE Score: 1.    Dispo: [facility], awaiting placement

## 2022-06-21 NOTE — PROGRESS NOTE ADULT - SUBJECTIVE AND OBJECTIVE BOX
INTERVAL HPI/OVERNIGHT EVENTS:  no new events  MEDICATIONS  (STANDING):  acetaminophen     Tablet .. 1000 milliGRAM(s) Oral every 8 hours  atorvastatin 20 milliGRAM(s) Oral at bedtime  enoxaparin Injectable 40 milliGRAM(s) SubCutaneous every 24 hours  lactated ringers. 1000 milliLiter(s) (100 mL/Hr) IV Continuous <Continuous>  lactated ringers. 1000 milliLiter(s) (100 mL/Hr) IV Continuous <Continuous>  losartan 12.5 milliGRAM(s) Oral daily  metoprolol succinate ER 12.5 milliGRAM(s) Oral daily  pantoprazole    Tablet 40 milliGRAM(s) Oral every 12 hours  senna 2 Tablet(s) Oral at bedtime  sucralfate suspension 1 Gram(s) Oral every 6 hours  tamsulosin 0.4 milliGRAM(s) Oral at bedtime    MEDICATIONS  (PRN):  aluminum hydroxide/magnesium hydroxide/simethicone Suspension 30 milliLiter(s) Oral four times a day PRN Indigestion  bisacodyl Suppository 10 milliGRAM(s) Rectal daily PRN If no bowel movement  HYDROmorphone  Injectable 0.5 milliGRAM(s) IV Push every 6 hours PRN Severe Pain (7 - 10)  magnesium hydroxide Suspension 30 milliLiter(s) Oral daily PRN Constipation  melatonin 3 milliGRAM(s) Oral at bedtime PRN Insomnia  morphine  - Injectable 2 milliGRAM(s) IV Push every 6 hours PRN Moderate Pain (4 - 6)  ondansetron Injectable 4 milliGRAM(s) IV Push every 6 hours PRN Nausea and/or Vomiting  oxyCODONE    IR 10 milliGRAM(s) Oral every 3 hours PRN Moderate Pain (4 - 6)  oxyCODONE    IR 5 milliGRAM(s) Oral every 3 hours PRN Mild Pain (1 - 3)  polyethylene glycol 3350 17 Gram(s) Oral daily PRN Constipation      Allergies    No Known Allergies    Intolerances        Review of Systems:    General:  No wt loss, fevers, chills, night sweats,fatigue,   Eyes:  Good vision, no reported pain  ENT:  No sore throat, pain, runny nose, dysphagia  CV:  No pain, palpitatioins, hypo/hypertension  Resp:  No dyspnea, cough, tachypnea, wheezing  GI:  No pain, No nausea, No vomiting, No diarrhea, No constipatiion, No weight loss, No fever, No pruritis, No rectal bleeding, No tarry stools, No dysphagia,  :  No pain, bleeding, incontinence, nocturia  Muscle:  No pain, weakness  Neuro:  No weakness, tingling, memory problems  Psych:  No fatigue, insomnia, mood problems, depression  Endocrine:  No polyuria, polydypsia, cold/heat intolerance  Heme:  No petechiae, ecchymosis, easy bruisability  Skin:  No rash, tattoos, scars, edema      Vital Signs Last 24 Hrs  T(C): 36.8 (21 Jun 2022 07:40), Max: 36.9 (20 Jun 2022 15:36)  T(F): 98.3 (21 Jun 2022 07:40), Max: 98.5 (20 Jun 2022 15:36)  HR: 86 (21 Jun 2022 07:40) (76 - 86)  BP: 116/66 (21 Jun 2022 07:40) (116/66 - 133/73)  BP(mean): --  RR: 18 (21 Jun 2022 07:40) (18 - 18)  SpO2: 94% (21 Jun 2022 07:40) (94% - 98%)    PHYSICAL EXAM:    Constitutional: NAD, well-developed  HEENT: EOMI, throat clear  Neck: No LAD, supple  Respiratory: CTA and P  Cardiovascular: S1 and S2, RRR, no M  Gastrointestinal: BS+, soft, NT/ND, neg HSM,  Extremities: No peripheral edema, neg clubing, cyanosis  Vascular: 2+ peripheral pulses  Neurological: A/O x 3, no focal deficits  Psychiatric: Normal mood, normal affect  Skin: No rashes      LABS:                        8.7    10.45 )-----------( 141      ( 21 Jun 2022 08:30 )             26.1     06-21    139  |  105  |  17  ----------------------------<  118<H>  3.8   |  29  |  0.80    Ca    8.5      21 Jun 2022 06:00            RADIOLOGY & ADDITIONAL TESTS:

## 2022-06-21 NOTE — CONSULT NOTE ADULT - ASSESSMENT
Patient presents with friction and shear injuries to bilateral buttocks: patient states he leaks and is wearing an adult incontinence pad for protection:   recommendation:   - Bladder training patient was instructed to use urinal Q2h  - cavilon daily and protective cream  Patient is on a low air loss mattress  At risk for altered tissue perfusion   Impaired perfusion of peripheral tissue /YES  Continue  Nutrition (as tolerated)  Continue  Offloading   Continue Pericare  Apply cair boots at all times while in bed.   Provide skin checks and foot placement q8h.  Care as per medicine will follow w/ you  Findings and recommendations discussed with FAISAL Edwards  Thank you for this consult  Rula Rajput NP, [facility] [phone]

## 2022-06-21 NOTE — OCCUPATIONAL THERAPY INITIAL EVALUATION ADULT - LIVES WITH, PROFILE
Pt lives with wife and daughter in a private home with 4 LARS with handrails on both sides and 1 step once inside the home. Pt has full flight of stairs inside the home but states all needs are met on first floor. Pt has both a walk-in shower and tub but states he will use walk-in shower on first floor. Pt owns RW, commode, and cane./children/spouse

## 2022-06-21 NOTE — PROGRESS NOTE ADULT - ASSESSMENT
88 year old male with PMHx CAD s/p CABG x4, HTN, HLD, BPH, presents to the ED after fall at home yesterday afternoon. Patient was found on the floor in the kitchen, unwitnessed fall, and had landed on his left side    cad  cabg  heme occult positive  BPH  HTN  HLD  eval for GI bleed    left HIP ORIF - POD - 3  s/p PRBC on June 20  serial H and H  vs noted  labs reviewed  GI follow up  on DVT p    ortho eval  cardio eval  cardiac catheterization July 20, 2021 demonstrating patent LIMA to the LAD, SVG to RCA, SVG to diagonal and a closed SVG to OM.  EF 45%. No stenting was performed  GI eval - GI bleed eval   [facility] rx regimen  serial H and H  monitor VS and HD and Sat  I yuri

## 2022-06-21 NOTE — PROGRESS NOTE ADULT - SUBJECTIVE AND OBJECTIVE BOX
[facility] patient examined and chart reviewed.    INTERVAL HPI/ OVERNIGHT EVENTS:   No events. Afebrile.    PAST MEDICAL & SURGICAL HISTORY:  CAD (coronary artery disease)  HTN (hypertension)  HLD (hyperlipidemia)  S/P CABG x 4  History of cholecystectomy    For details regarding the patient's social history, family history, and other miscellaneous elements, please refer the initial infectious diseases consultation and/or the admitting history and physical examination for this admission.    ROS:  CONSTITUTIONAL:  Negative fever or chills  EYES:  Negative  blurry vision or double vision  CARDIOVASCULAR:  Negative for chest pain or palpitations  RESPIRATORY:  Negative for cough, wheezing, or SOB   GASTROINTESTINAL:  Negative for nausea, vomiting, diarrhea, constipation, or abdominal pain  GENITOURINARY:  Negative frequency, urgency or dysuria  NEUROLOGIC:  No headache, confusion, dizziness, lightheadedness  All other systems were reviewed and are negative         Current inpatient medications :    ANTIBIOTICS/RELEVANT:    MEDICATIONS  (STANDING):  acetaminophen     Tablet .. 1000 milliGRAM(s) Oral every 8 hours  atorvastatin 20 milliGRAM(s) Oral at bedtime  enoxaparin Injectable 40 milliGRAM(s) SubCutaneous every 24 hours  lactated ringers. 1000 milliLiter(s) (100 mL/Hr) IV Continuous <Continuous>  lactated ringers. 1000 milliLiter(s) (100 mL/Hr) IV Continuous <Continuous>  losartan 12.5 milliGRAM(s) Oral daily  metoprolol succinate ER 12.5 milliGRAM(s) Oral daily  pantoprazole    Tablet 40 milliGRAM(s) Oral every 12 hours  senna 2 Tablet(s) Oral at bedtime  sucralfate suspension 1 Gram(s) Oral every 6 hours  tamsulosin 0.4 milliGRAM(s) Oral at bedtime    MEDICATIONS  (PRN):  aluminum hydroxide/magnesium hydroxide/simethicone Suspension 30 milliLiter(s) Oral four times a day PRN Indigestion  bisacodyl Suppository 10 milliGRAM(s) Rectal daily PRN If no bowel movement  HYDROmorphone  Injectable 0.5 milliGRAM(s) IV Push every 6 hours PRN Severe Pain (7 - 10)  magnesium hydroxide Suspension 30 milliLiter(s) Oral daily PRN Constipation  melatonin 3 milliGRAM(s) Oral at bedtime PRN Insomnia  morphine  - Injectable 2 milliGRAM(s) IV Push every 6 hours PRN Moderate Pain (4 - 6)  ondansetron Injectable 4 milliGRAM(s) IV Push every 6 hours PRN Nausea and/or Vomiting  oxyCODONE    IR 10 milliGRAM(s) Oral every 3 hours PRN Moderate Pain (4 - 6)  oxyCODONE    IR 5 milliGRAM(s) Oral every 3 hours PRN Mild Pain (1 - 3)  polyethylene glycol 3350 17 Gram(s) Oral daily PRN Constipation        Objective:  Vital Signs Last 24 Hrs  T(C): 37.5 (21 Jun 2022 16:11), Max: 37.5 (21 Jun 2022 16:11)  T(F): 99.5 (21 Jun 2022 16:11), Max: 99.5 (21 Jun 2022 16:11)  HR: 91 (21 Jun 2022 16:11) (76 - 91)  BP: 114/64 (21 Jun 2022 16:11) (114/64 - 133/73)  RR: 18 (21 Jun 2022 07:40) (18 - 18)  SpO2: 93% (21 Jun 2022 16:11) (93% - 98%)      Physical Exam:  General: no acute distress  Neck: supple, trachea midline  Lungs: clear, no wheeze/rhonchi  Cardiovascular: regular rate and rhythm, S1 S2  Abdomen: soft, nontender,  bowel sounds normal  Neurological: alert and oriented x3  Skin: no rash  Extremities: no edema  Left hip drsg c/d/i      LABS:                        8.7    10.45 )-----------( 141      ( 21 Jun 2022 08:30 )             26.1   06-21    139  |  105  |  17  ----------------------------<  118<H>  3.8   |  29  |  0.80    Ca    8.5      21 Jun 2022 06:00      MICROBIOLOGY:    Culture - Urine (collected 18 Jun 2022 06:05)  Source: Clean Catch Clean Catch (Midstream)  Final Report (19 Jun 2022 14:12):    >=3 organisms. Probable collection contamination.    Culture - Blood (collected 17 Jun 2022 17:20)  Source: .Blood Blood  Preliminary Report (18 Jun 2022 22:01):    No growth to date.    Culture - Blood (collected 17 Jun 2022 17:20)  Source: .Blood Blood  Preliminary Report (18 Jun 2022 22:01):    No growth to date.      RADIOLOGY & ADDITIONAL STUDIES:  ACC: [id number] EXAM:  XR CHEST PORTABLE URGENT 1V                          PROCEDURE DATE:  06/17/2022          INTERPRETATION:  AP semierect chest on June 17, 2022 at 2:46 PM. Patient   has vomiting.    Heart magnified by technique.    Sternotomy again noted.    There is an increasing left base atelectatic findings.    This is compared to earlier in the day.    IMPRESSION: Increasing left base atelectatic findings.    ACC: [id number] EXAM:  XR CHEST AP OR PA 1V                        ACC: [id number] EXAM:  XR FEMUR 2 VIEWS LT                        ACC: [id number] EXAM:  XR HIP WITH PELV 2-3V LT                          PROCEDURE DATE:  06/17/2022          INTERPRETATION:  Left hip with pelvis, left femur, and chest. Patient had   a fall with local trauma.    Left hip with pelvis. 3 views. 4 images.    There is rather mild symmetric hip degeneration.    There is density in the L4 body suggesting vertebroplasty.    There is a comminuted fracture content fracture of the left hip with   increased angulation at the fracture site.    Left femur. 2 views of the lower left femur show no effusion and moderate   knee degeneration.    AP chest on June 17, 2022 at 1:19 AM.    COMPARISON: None available.    Heart magnified by technique. Sternotomy noted.    Lungs clear. No fracture.    IMPRESSION: Comminuted left hip intertrochanteric fracture.    Assessment :   87YO M PMH CAD s/p CABG x4, HTN, HLD, BPH, admitted sp fall with left hip intertrochanteric fracture sp Left HIP ORIF 6/18/22  Suspect leukocytosis reactive- downtrending  Sp coffee ground emesis x 1  Anemia post op stable  No clear clinical evidence for infectious process    Plan :   Monitor off antibiotics  Trend temp and cbc  Pulm toileting  Asp precautions  Stable from ID standpoint  Dc planning    Continue with present regiment.  Appropriate use of antibiotics and adverse effects reviewed.    > 35 minutes were spent in direct patient care reviewing notes, medications ,labs data/ imaging , discussion with multidisciplinary team.    Thank you for allowing me to participate in care of your patient .    Stacy Dhaliwal MD  Infectious Disease  [phone]

## 2022-06-21 NOTE — DISCHARGE NOTE NURSING/CASE MANAGEMENT/SOCIAL WORK - PATIENT PORTAL LINK FT
You can access the FollowMyHealth Patient Portal offered by [facility] by registering at the following website: [email or web address]. By joining [email or web address]’s FollowMyHealth portal, you will also be able to view your health information using other applications (apps) compatible with our system.

## 2022-06-21 NOTE — CONSULT NOTE ADULT - CONSULT REQUESTED DATE/TIME
17-Jun-2022 15:29
17-Jun-2022 16:25
17-Jun-2022 16:55
17-Jun-2022 15:48
18-Jun-2022 08:11
21-Jun-2022 09:39

## 2022-06-21 NOTE — PROGRESS NOTE ADULT - SUBJECTIVE AND OBJECTIVE BOX
Date/Time Patient Seen:  		  Referring MD:   Data Reviewed	       Patient is a 88y old  Male who presents with a chief complaint of Fall/Syncope (20 Jun 2022 14:08)      Subjective/HPI     PAST MEDICAL & SURGICAL HISTORY:  CAD (coronary artery disease)    HTN (hypertension)    HLD (hyperlipidemia)    S/P CABG x 4    History of cholecystectomy          Medication list         MEDICATIONS  (STANDING):  acetaminophen     Tablet .. 1000 milliGRAM(s) Oral every 8 hours  atorvastatin 20 milliGRAM(s) Oral at bedtime  enoxaparin Injectable 40 milliGRAM(s) SubCutaneous every 24 hours  lactated ringers. 1000 milliLiter(s) (100 mL/Hr) IV Continuous <Continuous>  lactated ringers. 1000 milliLiter(s) (100 mL/Hr) IV Continuous <Continuous>  pantoprazole    Tablet 40 milliGRAM(s) Oral every 12 hours  senna 2 Tablet(s) Oral at bedtime  sucralfate suspension 1 Gram(s) Oral every 6 hours  tamsulosin 0.4 milliGRAM(s) Oral at bedtime    MEDICATIONS  (PRN):  aluminum hydroxide/magnesium hydroxide/simethicone Suspension 30 milliLiter(s) Oral four times a day PRN Indigestion  bisacodyl Suppository 10 milliGRAM(s) Rectal daily PRN If no bowel movement  HYDROmorphone  Injectable 0.5 milliGRAM(s) IV Push every 6 hours PRN Severe Pain (7 - 10)  magnesium hydroxide Suspension 30 milliLiter(s) Oral daily PRN Constipation  melatonin 3 milliGRAM(s) Oral at bedtime PRN Insomnia  morphine  - Injectable 2 milliGRAM(s) IV Push every 6 hours PRN Moderate Pain (4 - 6)  ondansetron Injectable 4 milliGRAM(s) IV Push every 6 hours PRN Nausea and/or Vomiting  oxyCODONE    IR 10 milliGRAM(s) Oral every 3 hours PRN Moderate Pain (4 - 6)  oxyCODONE    IR 5 milliGRAM(s) Oral every 3 hours PRN Mild Pain (1 - 3)  polyethylene glycol 3350 17 Gram(s) Oral daily PRN Constipation         Vitals log        ICU Vital Signs Last 24 Hrs  T(C): 36.8 (20 Jun 2022 23:59), Max: 37.2 (20 Jun 2022 10:10)  T(F): 98.3 (20 Jun 2022 23:59), Max: 99 (20 Jun 2022 10:10)  HR: 76 (20 Jun 2022 23:59) (76 - 85)  BP: 133/73 (20 Jun 2022 23:59) (111/65 - 133/73)  BP(mean): --  ABP: --  ABP(mean): --  RR: 18 (20 Jun 2022 23:59) (18 - 18)  SpO2: 98% (20 Jun 2022 23:59) (95% - 98%)           Input and Output:  I&O's Detail      Lab Data                        7.9    10.84 )-----------( 110      ( 20 Jun 2022 16:32 )             23.8     06-20    142  |  107  |  22  ----------------------------<  108<H>  3.9   |  30  |  0.98    Ca    8.1<L>      20 Jun 2022 07:51              Review of Systems	      Objective     Physical Examination    heart s1s2  lung dec BS  abd soft      Pertinent Lab findings & Imaging      Kyle:  JAISON   Adequate UO     I&O's Detail           Discussed with:     Cultures:	        Radiology

## 2022-06-21 NOTE — PROGRESS NOTE ADULT - SUBJECTIVE AND OBJECTIVE BOX
Patient is a 88y old  Male who presents with a chief complaint of ? esophgagitis (17 Jun 2022 16:25)    HPI: 88 year old male with PMHx CAD s/p CABG x4, HTN, HLD, BPH, presents to the ED after fall at home yesterday afternoon. Patient was found on the floor in the kitchen, unwitnessed fall, and had landed on his left side. After the fall family helped him up an moved him to the recliner where he laid down in extreme pain. He then passed out from the pain as per wife for about 1 minute. Patient regained consciousness and was brought to the ED. Patient denies memory of passing out. He also denies fever, chills, cp, sob, abd pain, n/v/d. Patient seen by cardiology and transferred to [facility] for further orthopedic workup and management. Per patient, he was nauseous en route from [facility] to ED and was coughing up dark Emesis. He has no hx of GI bleed and denies any hx of same. Patient has been on ASA long term for hx of CAD.  No seizure reported.  Found to have left Hip Fx.    Interval History  -    S/p Left Hip Sx    MEDICATIONS  (STANDING):    acetaminophen     Tablet .. 1000 milliGRAM(s) Oral every 8 hours  atorvastatin 20 milliGRAM(s) Oral at bedtime  enoxaparin Injectable 40 milliGRAM(s) SubCutaneous every 24 hours  lactated ringers. 1000 milliLiter(s) (100 mL/Hr) IV Continuous <Continuous>  lactated ringers. 1000 milliLiter(s) (100 mL/Hr) IV Continuous <Continuous>  losartan 12.5 milliGRAM(s) Oral daily  metoprolol succinate ER 12.5 milliGRAM(s) Oral daily  pantoprazole    Tablet 40 milliGRAM(s) Oral every 12 hours  senna 2 Tablet(s) Oral at bedtime  sucralfate suspension 1 Gram(s) Oral every 6 hours  tamsulosin 0.4 milliGRAM(s) Oral at bedtime    MEDICATIONS  (PRN):    aluminum hydroxide/magnesium hydroxide/simethicone Suspension 30 milliLiter(s) Oral four times a day PRN Indigestion  bisacodyl Suppository 10 milliGRAM(s) Rectal daily PRN If no bowel movement  HYDROmorphone  Injectable 0.5 milliGRAM(s) IV Push every 6 hours PRN Severe Pain (7 - 10)  magnesium hydroxide Suspension 30 milliLiter(s) Oral daily PRN Constipation  melatonin 3 milliGRAM(s) Oral at bedtime PRN Insomnia  morphine  - Injectable 2 milliGRAM(s) IV Push every 6 hours PRN Moderate Pain (4 - 6)  ondansetron Injectable 4 milliGRAM(s) IV Push every 6 hours PRN Nausea and/or Vomiting  oxyCODONE    IR 10 milliGRAM(s) Oral every 3 hours PRN Moderate Pain (4 - 6)  oxyCODONE    IR 5 milliGRAM(s) Oral every 3 hours PRN Mild Pain (1 - 3)  polyethylene glycol 3350 17 Gram(s) Oral daily PRN Constipation    Allergies    No Known Allergies    PHYSICAL EXAM:    Vital Signs Last 24 Hrs  T(C): 36.8 (21 Jun 2022 07:40), Max: 37.1 (20 Jun 2022 13:40)  T(F): 98.3 (21 Jun 2022 07:40), Max: 98.8 (20 Jun 2022 13:40)  HR: 86 (21 Jun 2022 07:40) (76 - 86)  BP: 116/66 (21 Jun 2022 07:40) (116/66 - 133/73)  RR: 18 (21 Jun 2022 07:40) (18 - 18)  SpO2: 94% (21 Jun 2022 07:40) (94% - 98%)  GENERAL: NAD, well-groomed, well-developed  HEAD:  Atraumatic, Normocephalic  EYES: EOMI, PERRLA, conjunctiva and sclera clear  NECK: Supple, No JVD    On Neurological Examination:    Mental Status - Pt is alert, awake, oriented X3. Higher functions are intact. Follows commands well and able to answer questions appropriately.    Speech -  Normal. Pt has no aphasia.    Cranial Nerves - Pupils 3 mm equal and reactive to light, extraocular eye movements intact. Pt has no visual field deficit. No facial asymmetry. Tongue - is in midline.    Motor Exam - 4 plus/5 all over, except left LE where he has Left Hip Fx.  S/p Left Hip Sx    Sensory Exam - Pt withdraws all extremities equally on stimulation.    Gait - Not tested.   S/p Left Hip Sx    Deep tendon Reflexes - 2 plus all over.    Coordination - Fine finger movements are normal on both sides. Finger to nose is also normal on both sides.       Neck Supple -  Yes.    LABS:    CBC Full  -  ( 21 Jun 2022 08:30 )  WBC Count : 10.45 K/uL  RBC Count : 2.59 M/uL  Hemoglobin : 8.7 g/dL  Hematocrit : 26.1 %  Platelet Count - Automated : 141 K/uL  Mean Cell Volume : 100.8 fl  Mean Cell Hemoglobin : 33.6 pg  Mean Cell Hemoglobin Concentration : 33.3 gm/dL    06-21    139  |  105  |  17  ----------------------------<  118<H>  3.8   |  29  |  0.80    Ca    8.5      21 Jun 2022 06:00    RADIOLOGY & ADDITIONAL STUDIES:    < from: CT Hip No Cont, Left (06.17.22 @ 08:11) >    Comminuted left intertrochanteric fracture.    < end of copied text >

## 2022-06-21 NOTE — OCCUPATIONAL THERAPY INITIAL EVALUATION ADULT - DIAGNOSIS, OT EVAL
Pt with impaired ROM, strength, and balance impacting ability to complete ADLs, IADLs, functional mobility/transfers.

## 2022-06-21 NOTE — PROGRESS NOTE ADULT - ASSESSMENT
Pt is seen for Fall  - was mechanical fall.   Didn't hit head.   After the fall pt had Brief LOC  No seizure activity is reported.  Non focal exam except left LE   -Has Left Hip Fx.  Likely had Vasovagal syncope.  Ortho follows  - S/p Left Hip Sx   Pain meds.  Continue PT. Fall/safety precautions.  D/w Pt. Questions answered.  Would continue to follow.

## 2022-06-21 NOTE — OCCUPATIONAL THERAPY INITIAL EVALUATION ADULT - IMPAIRED TRANSFERS: SIT/STAND, REHAB EVAL
PMD,   Phone: (   )    -  Fax: (   )    -  Follow Up Time: 1 week  
impaired balance/decreased ROM/decreased strength

## 2022-06-21 NOTE — PROGRESS NOTE ADULT - PROVIDER SPECIALTY LIST ADULT
Cardiology
Infectious Disease
Neurology
Orthopedics
Cardiology
Cardiology
Infectious Disease
Neurology
Pulmonology
Gastroenterology
Hospitalist
Neurology
Neurology
Orthopedics
Pulmonology
Hospitalist

## 2022-06-21 NOTE — PROGRESS NOTE ADULT - SUBJECTIVE AND OBJECTIVE BOX
Post Op Day #3    SUBJECTIVE    89yo Male status post ORIF left hip.   Patient is alert and comfortable.    Pain is controlled with current pain regimen.  Denies nausea, vomiting, chest pain, shortness of breath, abdominal pain or fever.   No new complaints.    OBJECTIVE    Vital Signs Last 24 Hrs  T(C): 36.8 (20 Jun 2022 23:59), Max: 37.2 (20 Jun 2022 10:10)  T(F): 98.3 (20 Jun 2022 23:59), Max: 99 (20 Jun 2022 10:10)  HR: 76 (20 Jun 2022 23:59) (76 - 85)  BP: 133/73 (20 Jun 2022 23:59) (111/65 - 133/73)  BP(mean): --  RR: 18 (20 Jun 2022 23:59) (18 - 18)  SpO2: 98% (20 Jun 2022 23:59) (95% - 98%)  I&O's Summary      PHYSICAL EXAM    Left hip Silverlon dressing is clean, dry and intact.   The calf is supple/nontender.   Sensation to light touch is grossly intact distally.   Motor function distally is intact.   No foot drop.   (2+) dorsalis pedis pulse. Capillary refill is less than 2 seconds. No cyanosis.                          7.9<L>  10.84<H> )-----------( 110<L>    ( 20 Jun 2022 16:32 )             23.8<L>  20 Jun 2022 16:32                        7.1<L>  10.91<H> )-----------( 118<L>    ( 20 Jun 2022 07:51 )             22.2<L>  20 Jun 2022 07:51    20 Jun 2022 07:51    142    |  107    |  22     ----------------------------<  108<H>  3.9     |  30     |  0.98     Ca    8.1<L>      20 Jun 2022 07:51        ASSESSMENT AND PLAN    - Orthopedically stable  - F/u CBC  - DVT prophylaxis: PAS + Lovenox 40mg daily  - Continue physical therapy and occupational therapy  - Weight bearing as tolerated of the left lower extremity with assistance of a walker  - Incentive spirometry encouraged  - Pain control as clinically indicated  - Disposition: subacute rehabilitation when medically stable

## 2022-06-21 NOTE — PROGRESS NOTE ADULT - TIME BILLING
The total amount of time listed was spent reviewing the hospital notes, laboratory values, imaging findings, assessing/counseling the patient, discussing with nursing staff.

## 2022-06-22 LAB
CULTURE RESULTS: SIGNIFICANT CHANGE UP
CULTURE RESULTS: SIGNIFICANT CHANGE UP
SPECIMEN SOURCE: SIGNIFICANT CHANGE UP
SPECIMEN SOURCE: SIGNIFICANT CHANGE UP

## 2023-01-04 NOTE — ED ADULT NURSE NOTE - SUICIDE SCREENING QUESTION 2
What Is The Reason For Today's Visit?: Preventative Skin Check Additional History: Pt in gown for fbse, pt denies spots of concern No

## 2023-01-21 NOTE — PHYSICAL THERAPY INITIAL EVALUATION ADULT - AMBULATION SKILLS, REHAB EVAL
If you are a smoker, it is important for your health to stop smoking. Please be aware that second hand smoke is also harmful. RW/independent/needs device

## 2023-06-05 ENCOUNTER — INPATIENT (INPATIENT)
Facility: HOSPITAL | Age: 88
LOS: 2 days | Discharge: HOME | DRG: 556 | End: 2023-06-08
Attending: FAMILY MEDICINE | Admitting: STUDENT IN AN ORGANIZED HEALTH CARE EDUCATION/TRAINING PROGRAM
Payer: MEDICARE

## 2023-06-05 VITALS
TEMPERATURE: 98 F | HEIGHT: 71 IN | RESPIRATION RATE: 19 BRPM | WEIGHT: 214.95 LBS | SYSTOLIC BLOOD PRESSURE: 182 MMHG | DIASTOLIC BLOOD PRESSURE: 80 MMHG | HEART RATE: 67 BPM | OXYGEN SATURATION: 98 %

## 2023-06-05 DIAGNOSIS — Z87.438 PERSONAL HISTORY OF OTHER DISEASES OF MALE GENITAL ORGANS: ICD-10-CM

## 2023-06-05 DIAGNOSIS — M25.552 PAIN IN LEFT HIP: ICD-10-CM

## 2023-06-05 DIAGNOSIS — E78.5 HYPERLIPIDEMIA, UNSPECIFIED: ICD-10-CM

## 2023-06-05 DIAGNOSIS — M54.9 DORSALGIA, UNSPECIFIED: ICD-10-CM

## 2023-06-05 DIAGNOSIS — F32.9 MAJOR DEPRESSIVE DISORDER, SINGLE EPISODE, UNSPECIFIED: ICD-10-CM

## 2023-06-05 DIAGNOSIS — Z90.49 ACQUIRED ABSENCE OF OTHER SPECIFIED PARTS OF DIGESTIVE TRACT: Chronic | ICD-10-CM

## 2023-06-05 DIAGNOSIS — I10 ESSENTIAL (PRIMARY) HYPERTENSION: ICD-10-CM

## 2023-06-05 DIAGNOSIS — I25.10 ATHEROSCLEROTIC HEART DISEASE OF NATIVE CORONARY ARTERY WITHOUT ANGINA PECTORIS: ICD-10-CM

## 2023-06-05 DIAGNOSIS — Z29.9 ENCOUNTER FOR PROPHYLACTIC MEASURES, UNSPECIFIED: ICD-10-CM

## 2023-06-05 DIAGNOSIS — Z95.1 PRESENCE OF AORTOCORONARY BYPASS GRAFT: Chronic | ICD-10-CM

## 2023-06-05 LAB
ALBUMIN SERPL ELPH-MCNC: 3.4 G/DL — SIGNIFICANT CHANGE UP (ref 3.3–5)
ALP SERPL-CCNC: 81 U/L — SIGNIFICANT CHANGE UP (ref 40–120)
ALT FLD-CCNC: 21 U/L — SIGNIFICANT CHANGE UP (ref 12–78)
ANION GAP SERPL CALC-SCNC: 4 MMOL/L — LOW (ref 5–17)
APPEARANCE UR: CLEAR — SIGNIFICANT CHANGE UP
AST SERPL-CCNC: 15 U/L — SIGNIFICANT CHANGE UP (ref 15–37)
BASOPHILS # BLD AUTO: 0.03 K/UL — SIGNIFICANT CHANGE UP (ref 0–0.2)
BASOPHILS NFR BLD AUTO: 0.4 % — SIGNIFICANT CHANGE UP (ref 0–2)
BILIRUB SERPL-MCNC: 1.1 MG/DL — SIGNIFICANT CHANGE UP (ref 0.2–1.2)
BILIRUB UR-MCNC: NEGATIVE — SIGNIFICANT CHANGE UP
BUN SERPL-MCNC: 11 MG/DL — SIGNIFICANT CHANGE UP (ref 7–23)
CALCIUM SERPL-MCNC: 8.7 MG/DL — SIGNIFICANT CHANGE UP (ref 8.5–10.1)
CHLORIDE SERPL-SCNC: 108 MMOL/L — SIGNIFICANT CHANGE UP (ref 96–108)
CO2 SERPL-SCNC: 28 MMOL/L — SIGNIFICANT CHANGE UP (ref 22–31)
COLOR SPEC: YELLOW — SIGNIFICANT CHANGE UP
CREAT SERPL-MCNC: 0.7 MG/DL — SIGNIFICANT CHANGE UP (ref 0.5–1.3)
DIFF PNL FLD: NEGATIVE — SIGNIFICANT CHANGE UP
EGFR: 88 ML/MIN/1.73M2 — SIGNIFICANT CHANGE UP
EOSINOPHIL # BLD AUTO: 0.03 K/UL — SIGNIFICANT CHANGE UP (ref 0–0.5)
EOSINOPHIL NFR BLD AUTO: 0.4 % — SIGNIFICANT CHANGE UP (ref 0–6)
EPI CELLS # UR: PRESENT
ERYTHROCYTE [SEDIMENTATION RATE] IN BLOOD: 16 MM/HR — SIGNIFICANT CHANGE UP (ref 0–20)
GLUCOSE SERPL-MCNC: 98 MG/DL — SIGNIFICANT CHANGE UP (ref 70–99)
GLUCOSE UR QL: NEGATIVE MG/DL — SIGNIFICANT CHANGE UP
HCT VFR BLD CALC: 41.7 % — SIGNIFICANT CHANGE UP (ref 39–50)
HGB BLD-MCNC: 13.2 G/DL — SIGNIFICANT CHANGE UP (ref 13–17)
IMM GRANULOCYTES NFR BLD AUTO: 0.6 % — SIGNIFICANT CHANGE UP (ref 0–0.9)
KETONES UR-MCNC: NEGATIVE MG/DL — SIGNIFICANT CHANGE UP
LEUKOCYTE ESTERASE UR-ACNC: ABNORMAL
LYMPHOCYTES # BLD AUTO: 0.62 K/UL — LOW (ref 1–3.3)
LYMPHOCYTES # BLD AUTO: 8.6 % — LOW (ref 13–44)
MCHC RBC-ENTMCNC: 31.7 GM/DL — LOW (ref 32–36)
MCHC RBC-ENTMCNC: 33.8 PG — SIGNIFICANT CHANGE UP (ref 27–34)
MCV RBC AUTO: 106.6 FL — HIGH (ref 80–100)
MONOCYTES # BLD AUTO: 0.8 K/UL — SIGNIFICANT CHANGE UP (ref 0–0.9)
MONOCYTES NFR BLD AUTO: 11 % — SIGNIFICANT CHANGE UP (ref 2–14)
NEUTROPHILS # BLD AUTO: 5.72 K/UL — SIGNIFICANT CHANGE UP (ref 1.8–7.4)
NEUTROPHILS NFR BLD AUTO: 79 % — HIGH (ref 43–77)
NITRITE UR-MCNC: NEGATIVE — SIGNIFICANT CHANGE UP
NRBC # BLD: 0 /100 WBCS — SIGNIFICANT CHANGE UP (ref 0–0)
PH UR: 7 — SIGNIFICANT CHANGE UP (ref 5–8)
PLATELET # BLD AUTO: 130 K/UL — LOW (ref 150–400)
POTASSIUM SERPL-MCNC: 3.7 MMOL/L — SIGNIFICANT CHANGE UP (ref 3.5–5.3)
POTASSIUM SERPL-SCNC: 3.7 MMOL/L — SIGNIFICANT CHANGE UP (ref 3.5–5.3)
PROT SERPL-MCNC: 6.9 G/DL — SIGNIFICANT CHANGE UP (ref 6–8.3)
PROT UR-MCNC: NEGATIVE MG/DL — SIGNIFICANT CHANGE UP
RBC # BLD: 3.91 M/UL — LOW (ref 4.2–5.8)
RBC # FLD: 13.1 % — SIGNIFICANT CHANGE UP (ref 10.3–14.5)
RBC CASTS # UR COMP ASSIST: 0 /HPF — SIGNIFICANT CHANGE UP (ref 0–4)
SODIUM SERPL-SCNC: 140 MMOL/L — SIGNIFICANT CHANGE UP (ref 135–145)
SP GR SPEC: 1.01 — SIGNIFICANT CHANGE UP (ref 1–1.03)
UROBILINOGEN FLD QL: 0.2 MG/DL — SIGNIFICANT CHANGE UP (ref 0.2–1)
WBC # BLD: 7.24 K/UL — SIGNIFICANT CHANGE UP (ref 3.8–10.5)
WBC # FLD AUTO: 7.24 K/UL — SIGNIFICANT CHANGE UP (ref 3.8–10.5)
WBC UR QL: 0 /HPF — SIGNIFICANT CHANGE UP (ref 0–5)

## 2023-06-05 PROCEDURE — 76376 3D RENDER W/INTRP POSTPROCES: CPT | Mod: 26

## 2023-06-05 PROCEDURE — 99285 EMERGENCY DEPT VISIT HI MDM: CPT | Mod: FS

## 2023-06-05 PROCEDURE — 72131 CT LUMBAR SPINE W/O DYE: CPT | Mod: 26,MA

## 2023-06-05 PROCEDURE — 73502 X-RAY EXAM HIP UNI 2-3 VIEWS: CPT | Mod: 26,LT

## 2023-06-05 PROCEDURE — 72192 CT PELVIS W/O DYE: CPT | Mod: 26,MA

## 2023-06-05 PROCEDURE — 73552 X-RAY EXAM OF FEMUR 2/>: CPT | Mod: 26,LT

## 2023-06-05 PROCEDURE — 99223 1ST HOSP IP/OBS HIGH 75: CPT | Mod: GC

## 2023-06-05 PROCEDURE — 70450 CT HEAD/BRAIN W/O DYE: CPT | Mod: 26,MA

## 2023-06-05 PROCEDURE — 71045 X-RAY EXAM CHEST 1 VIEW: CPT | Mod: 26

## 2023-06-05 RX ORDER — ESCITALOPRAM OXALATE 10 MG/1
5 TABLET, FILM COATED ORAL DAILY
Refills: 0 | Status: DISCONTINUED | OUTPATIENT
Start: 2023-06-05 | End: 2023-06-08

## 2023-06-05 RX ORDER — ONDANSETRON 8 MG/1
4 TABLET, FILM COATED ORAL EVERY 8 HOURS
Refills: 0 | Status: DISCONTINUED | OUTPATIENT
Start: 2023-06-05 | End: 2023-06-08

## 2023-06-05 RX ORDER — METOPROLOL TARTRATE 50 MG
1 TABLET ORAL
Qty: 0 | Refills: 0 | DISCHARGE

## 2023-06-05 RX ORDER — HYDROCHLOROTHIAZIDE 25 MG
1 TABLET ORAL
Refills: 0 | DISCHARGE

## 2023-06-05 RX ORDER — ENOXAPARIN SODIUM 100 MG/ML
40 INJECTION SUBCUTANEOUS EVERY 24 HOURS
Refills: 0 | Status: DISCONTINUED | OUTPATIENT
Start: 2023-06-05 | End: 2023-06-08

## 2023-06-05 RX ORDER — ASPIRIN/CALCIUM CARB/MAGNESIUM 324 MG
81 TABLET ORAL DAILY
Refills: 0 | Status: DISCONTINUED | OUTPATIENT
Start: 2023-06-06 | End: 2023-06-08

## 2023-06-05 RX ORDER — TAMSULOSIN HYDROCHLORIDE 0.4 MG/1
1 CAPSULE ORAL
Qty: 0 | Refills: 0 | DISCHARGE

## 2023-06-05 RX ORDER — METOPROLOL TARTRATE 50 MG
25 TABLET ORAL DAILY
Refills: 0 | Status: DISCONTINUED | OUTPATIENT
Start: 2023-06-05 | End: 2023-06-08

## 2023-06-05 RX ORDER — HYDROCHLOROTHIAZIDE 25 MG
1 TABLET ORAL
Qty: 0 | Refills: 0 | DISCHARGE

## 2023-06-05 RX ORDER — ESCITALOPRAM OXALATE 10 MG/1
1 TABLET, FILM COATED ORAL
Refills: 0 | DISCHARGE

## 2023-06-05 RX ORDER — ATORVASTATIN CALCIUM 80 MG/1
20 TABLET, FILM COATED ORAL AT BEDTIME
Refills: 0 | Status: DISCONTINUED | OUTPATIENT
Start: 2023-06-05 | End: 2023-06-08

## 2023-06-05 RX ORDER — LANOLIN ALCOHOL/MO/W.PET/CERES
10 CREAM (GRAM) TOPICAL AT BEDTIME
Refills: 0 | Status: DISCONTINUED | OUTPATIENT
Start: 2023-06-05 | End: 2023-06-08

## 2023-06-05 RX ORDER — ATORVASTATIN CALCIUM 80 MG/1
1 TABLET, FILM COATED ORAL
Qty: 0 | Refills: 0 | DISCHARGE

## 2023-06-05 RX ORDER — ASPIRIN/CALCIUM CARB/MAGNESIUM 324 MG
1 TABLET ORAL
Qty: 0 | Refills: 0 | DISCHARGE

## 2023-06-05 RX ORDER — LOSARTAN POTASSIUM 100 MG/1
1 TABLET, FILM COATED ORAL
Qty: 0 | Refills: 0 | DISCHARGE

## 2023-06-05 RX ORDER — ESCITALOPRAM OXALATE 10 MG/1
2 TABLET, FILM COATED ORAL
Refills: 0 | DISCHARGE

## 2023-06-05 RX ORDER — TAMSULOSIN HYDROCHLORIDE 0.4 MG/1
0.4 CAPSULE ORAL AT BEDTIME
Refills: 0 | Status: DISCONTINUED | OUTPATIENT
Start: 2023-06-05 | End: 2023-06-08

## 2023-06-05 RX ORDER — FERROUS SULFATE 325(65) MG
325 TABLET ORAL
Refills: 0 | Status: DISCONTINUED | OUTPATIENT
Start: 2023-06-05 | End: 2023-06-08

## 2023-06-05 RX ORDER — ACETAMINOPHEN 500 MG
650 TABLET ORAL ONCE
Refills: 0 | Status: COMPLETED | OUTPATIENT
Start: 2023-06-05 | End: 2023-06-05

## 2023-06-05 RX ORDER — ACETAMINOPHEN 500 MG
650 TABLET ORAL EVERY 6 HOURS
Refills: 0 | Status: DISCONTINUED | OUTPATIENT
Start: 2023-06-05 | End: 2023-06-08

## 2023-06-05 RX ADMIN — ATORVASTATIN CALCIUM 20 MILLIGRAM(S): 80 TABLET, FILM COATED ORAL at 21:58

## 2023-06-05 RX ADMIN — Medication 650 MILLIGRAM(S): at 13:00

## 2023-06-05 RX ADMIN — Medication 650 MILLIGRAM(S): at 12:08

## 2023-06-05 RX ADMIN — ENOXAPARIN SODIUM 40 MILLIGRAM(S): 100 INJECTION SUBCUTANEOUS at 21:59

## 2023-06-05 RX ADMIN — TAMSULOSIN HYDROCHLORIDE 0.4 MILLIGRAM(S): 0.4 CAPSULE ORAL at 21:58

## 2023-06-05 RX ADMIN — Medication 10 MILLIGRAM(S): at 21:59

## 2023-06-05 NOTE — H&P ADULT - NSHPREVIEWOFSYSTEMS_GEN_ALL_CORE
CONSTITUTIONAL: No fever/chills.  HENMT: No HA, lightheadedness/dizziness  RESPIRATORY: No cough, wheezing, hemoptysis; No shortness of breath.  CARDIOVASCULAR: No chest pain, palpitations.  GASTROINTESTINAL: No abdominal or epigastric pain. No nausea or vomiting; No diarrhea or constipation.  GENITOURINARY: No dysuria, changes in frequency, hematuria, or incontinence  NEUROLOGICAL: Baseline strength. Sensation intact bilaterally.  MUSCULOSKELETAL: +L Hip pain as per HPI

## 2023-06-05 NOTE — CONSULT NOTE ADULT - SUBJECTIVE AND OBJECTIVE BOX
89y Male presents with atraumatic left hip pain for 3-4 days. Patient reports he has had limited ambulatory ability 2/2 exacerbation of pain. Lives with wife/daughter who were concerned. Denies falls, new trauma, fever, chills, recent illness. Minimal community ambulator, walks at home w/ walker. Denies numbness/tingling in the affected extremity. Denies head strike/LOC/other orthopedic injuries at this time. Reports not following up w/ Dr. Bonilla after his surgery.     PAST MEDICAL & SURGICAL HISTORY:  CAD (coronary artery disease)      HTN (hypertension)      HLD (hyperlipidemia)      S/P CABG x 4      History of cholecystectomy        Home Medications:  acetaminophen 500 mg oral tablet: 2 tab(s) orally every 8 hours (2023 12:37)  Aspir 81 oral delayed release tablet: 1 tab(s) orally once a day.  resume after lovenox course is complete (2023 12:37)  atorvastatin 20 mg oral tablet: 1 tab(s) orally once a day (2023 12:37)  bisacodyl 10 mg rectal suppository: 1 suppository(ies) rectal once a day, As needed, If no bowel movement (2023 12:37)  escitalopram 5 mg oral tablet: 2 orally (2023 12:37)  hydroCHLOROthiazide 12.5 mg oral tablet: 1 tab(s) orally every other day (2023 12:37)  melatonin 10 mg oral tablet: 1 orally (2023 12:37)  Metoprolol Succinate ER 25 mg oral tablet, extended release: 1 tab(s) orally once a day (2023 12:37)  pantoprazole 40 mg oral delayed release tablet: 1 tab(s) orally every 12 hours (2023 12:37)  sucralfate 1 g/10 mL oral suspension: 10 milliliter(s) orally every 6 hours (2023 12:37)  tamsulosin 0.4 mg oral capsule: 1 cap(s) orally once a day (2023 12:37)    Allergies    No Known Allergies    Intolerances                              13.2   7.24  )-----------( 130      ( 2023 11:50 )             41.7     06-05    140  |  108  |  11  ----------------------------<  98  3.7   |  28  |  0.70    Ca    8.7      2023 11:50    TPro  6.9  /  Alb  3.4  /  TBili  1.1  /  DBili  x   /  AST  15  /  ALT  21  /  AlkPhos  81  06-05      Urinalysis Basic - ( 2023 12:40 )    Color: Yellow / Appearance: Clear / S.013 / pH: x  Gluc: x / Ketone: Negative mg/dL  / Bili: Negative / Urobili: 0.2 mg/dL   Blood: x / Protein: Negative mg/dL / Nitrite: Negative   Leuk Esterase: Trace / RBC: 0 /HPF / WBC 0 /HPF   Sq Epi: x / Non Sq Epi: x / Bacteria: x          Vital Signs Last 24 Hrs  T(C): 36.7 (2023 11:29), Max: 36.7 (2023 11:29)  T(F): 98 (2023 11:29), Max: 98 (2023 11:29)  HR: 67 (2023 11:29) (67 - 67)  BP: 182/80 (2023 11:29) (182/80 - 182/80)  BP(mean): --  RR: 19 (2023 11:29) (19 - 19)  SpO2: 98% (2023 11:29) (98% - 98%)    Parameters below as of 2023 11:29  Patient On (Oxygen Delivery Method): room air        PHYSICAL EXAM  General: NAD, Awake and Alert      LLE:   Skin intact, no erythema, ecchymosis, edema, gross deformity  NTTP over the bony prominences of the hip/knee/ankle/foot  Mild pain with gross motion  No pain with micromotion  painless passive/active ROM of the knee/ankle/foot  L2-S1 SILT  motor grossly intact throughout hip flexors/quads/hams/TA/EHL/FHL/GSC  + DP/PT pulses  Mild pain with log roll  no pain on axial loading  compartments soft and compressible  calves nontender        SECONDARY EXAM: Benign, Skin intact, SILT throughout, motor grossly intact throughout, no other orthopedic injuries at this time, compartments soft and compressible    SPINE: Skin intact, no bony tenderness or step-offs appreciated throughout cervical/thoracic/lumbar/sacral spine      IMAGING:  XR : pending  CT pelv: hypertrophic nonunion of left basicervical 3-4 part femoral neck fx     Assessment/Plan:  89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 Bonilla    -Pain control as needed  -DVT ppx per primary   -WBAT   -No acute orthopaedic surgical intervention indicated  -Pain control, NSAIDs (If tolerated), rest, ice  -PT/OT  -FU outpatient w/ Dr. Bonilla for continued care   -Will discuss with attending, and advise if plan changes   89y Male presents with atraumatic left hip pain for 3-4 days. Patient reports he has had limited ambulatory ability 2/2 exacerbation of pain. Lives with wife/daughter who were concerned. Denies falls, new trauma, fever, chills, recent illness. Minimal community ambulator, walks at home w/ walker. Denies numbness/tingling in the affected extremity. Denies head strike/LOC/other orthopedic injuries at this time. Reports not following up w/ Dr. Bonilla after his surgery.     PAST MEDICAL & SURGICAL HISTORY:  CAD (coronary artery disease)      HTN (hypertension)      HLD (hyperlipidemia)      S/P CABG x 4      History of cholecystectomy        Home Medications:  acetaminophen 500 mg oral tablet: 2 tab(s) orally every 8 hours (2023 12:37)  Aspir 81 oral delayed release tablet: 1 tab(s) orally once a day.  resume after lovenox course is complete (2023 12:37)  atorvastatin 20 mg oral tablet: 1 tab(s) orally once a day (2023 12:37)  bisacodyl 10 mg rectal suppository: 1 suppository(ies) rectal once a day, As needed, If no bowel movement (2023 12:37)  escitalopram 5 mg oral tablet: 2 orally (2023 12:37)  hydroCHLOROthiazide 12.5 mg oral tablet: 1 tab(s) orally every other day (2023 12:37)  melatonin 10 mg oral tablet: 1 orally (2023 12:37)  Metoprolol Succinate ER 25 mg oral tablet, extended release: 1 tab(s) orally once a day (2023 12:37)  pantoprazole 40 mg oral delayed release tablet: 1 tab(s) orally every 12 hours (2023 12:37)  sucralfate 1 g/10 mL oral suspension: 10 milliliter(s) orally every 6 hours (2023 12:37)  tamsulosin 0.4 mg oral capsule: 1 cap(s) orally once a day (2023 12:37)    Allergies    No Known Allergies    Intolerances                              13.2   7.24  )-----------( 130      ( 2023 11:50 )             41.7     06-05    140  |  108  |  11  ----------------------------<  98  3.7   |  28  |  0.70    Ca    8.7      2023 11:50    TPro  6.9  /  Alb  3.4  /  TBili  1.1  /  DBili  x   /  AST  15  /  ALT  21  /  AlkPhos  81  06-05      Urinalysis Basic - ( 2023 12:40 )    Color: Yellow / Appearance: Clear / S.013 / pH: x  Gluc: x / Ketone: Negative mg/dL  / Bili: Negative / Urobili: 0.2 mg/dL   Blood: x / Protein: Negative mg/dL / Nitrite: Negative   Leuk Esterase: Trace / RBC: 0 /HPF / WBC 0 /HPF   Sq Epi: x / Non Sq Epi: x / Bacteria: x          Vital Signs Last 24 Hrs  T(C): 36.7 (2023 11:29), Max: 36.7 (2023 11:29)  T(F): 98 (2023 11:29), Max: 98 (2023 11:29)  HR: 67 (2023 11:29) (67 - 67)  BP: 182/80 (2023 11:29) (182/80 - 182/80)  BP(mean): --  RR: 19 (2023 11:29) (19 - 19)  SpO2: 98% (2023 11:29) (98% - 98%)    Parameters below as of 2023 11:29  Patient On (Oxygen Delivery Method): room air        PHYSICAL EXAM  General: NAD, Awake and Alert      LLE:   Skin intact, no erythema, ecchymosis, edema, gross deformity  NTTP over the bony prominences of the hip/knee/ankle/foot  Mild pain with gross motion  No pain with micromotion  painless passive/active ROM of the knee/ankle/foot  L2-S1 SILT  motor grossly intact throughout hip flexors/quads/hams/TA/EHL/FHL/GSC  + DP/PT pulses  Mild pain with log roll  no pain on axial loading  compartments soft and compressible  calves nontender        SECONDARY EXAM: Benign, Skin intact, SILT throughout, motor grossly intact throughout, no other orthopedic injuries at this time, compartments soft and compressible    SPINE: Skin intact, no bony tenderness or step-offs appreciated throughout cervical/thoracic/lumbar/sacral spine      IMAGING:  XR : pending  CT pelv: hypertrophic nonunion of left basicervical 3-4 part femoral neck fx     Assessment/Plan:  89y Male with Left hip osteoarthritic, 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 Bonilla    -Pain control as needed  -DVT ppx per primary   -WBAT   -No acute orthopaedic surgical intervention indicated  -Pain control, NSAIDs (If tolerated), rest, ice  -PT/OT  -FU outpatient w/ Dr. Bonilla for continued care   -Will discuss with attending, and advise if plan changes   89y Male presents with atraumatic left hip pain for 3-4 days. Patient reports he has had limited ambulatory ability 2/2 exacerbation of pain. Lives with wife/daughter who were concerned. Denies falls, new trauma, fever, chills, recent illness. Minimal community ambulator, walks at home w/ walker. Denies numbness/tingling in the affected extremity. Denies head strike/LOC/other orthopedic injuries at this time. Reports not following up w/ Dr. Bonilla after his surgery.     PAST MEDICAL & SURGICAL HISTORY:  CAD (coronary artery disease)      HTN (hypertension)      HLD (hyperlipidemia)      S/P CABG x 4      History of cholecystectomy        Home Medications:  acetaminophen 500 mg oral tablet: 2 tab(s) orally every 8 hours (2023 12:37)  Aspir 81 oral delayed release tablet: 1 tab(s) orally once a day.  resume after lovenox course is complete (2023 12:37)  atorvastatin 20 mg oral tablet: 1 tab(s) orally once a day (2023 12:37)  bisacodyl 10 mg rectal suppository: 1 suppository(ies) rectal once a day, As needed, If no bowel movement (2023 12:37)  escitalopram 5 mg oral tablet: 2 orally (2023 12:37)  hydroCHLOROthiazide 12.5 mg oral tablet: 1 tab(s) orally every other day (2023 12:37)  melatonin 10 mg oral tablet: 1 orally (2023 12:37)  Metoprolol Succinate ER 25 mg oral tablet, extended release: 1 tab(s) orally once a day (2023 12:37)  pantoprazole 40 mg oral delayed release tablet: 1 tab(s) orally every 12 hours (2023 12:37)  sucralfate 1 g/10 mL oral suspension: 10 milliliter(s) orally every 6 hours (2023 12:37)  tamsulosin 0.4 mg oral capsule: 1 cap(s) orally once a day (2023 12:37)    Allergies    No Known Allergies    Intolerances                              13.2   7.24  )-----------( 130      ( 2023 11:50 )             41.7     06-05    140  |  108  |  11  ----------------------------<  98  3.7   |  28  |  0.70    Ca    8.7      2023 11:50    TPro  6.9  /  Alb  3.4  /  TBili  1.1  /  DBili  x   /  AST  15  /  ALT  21  /  AlkPhos  81  06-05      Urinalysis Basic - ( 2023 12:40 )    Color: Yellow / Appearance: Clear / S.013 / pH: x  Gluc: x / Ketone: Negative mg/dL  / Bili: Negative / Urobili: 0.2 mg/dL   Blood: x / Protein: Negative mg/dL / Nitrite: Negative   Leuk Esterase: Trace / RBC: 0 /HPF / WBC 0 /HPF   Sq Epi: x / Non Sq Epi: x / Bacteria: x          Vital Signs Last 24 Hrs  T(C): 36.7 (2023 11:29), Max: 36.7 (2023 11:29)  T(F): 98 (2023 11:29), Max: 98 (2023 11:29)  HR: 67 (2023 11:29) (67 - 67)  BP: 182/80 (2023 11:29) (182/80 - 182/80)  BP(mean): --  RR: 19 (2023 11:29) (19 - 19)  SpO2: 98% (2023 11:29) (98% - 98%)    Parameters below as of 2023 11:29  Patient On (Oxygen Delivery Method): room air        PHYSICAL EXAM  General: NAD, Awake and Alert      LLE:   Skin intact, no erythema, ecchymosis, edema, gross deformity  NTTP over the bony prominences of the hip/knee/ankle/foot  Mild pain with gross motion  No pain with micromotion  painless passive/active ROM of the knee/ankle/foot  L2-S1 SILT  motor grossly intact throughout hip flexors/quads/hams/TA/EHL/FHL/GSC  + DP/PT pulses  Mild pain with log roll  no pain on axial loading  compartments soft and compressible  calves nontender        SECONDARY EXAM: Benign, Skin intact, SILT throughout, motor grossly intact throughout, no other orthopedic injuries at this time, compartments soft and compressible    SPINE: Skin intact, no bony tenderness or step-offs appreciated throughout cervical/thoracic/lumbar/sacral spine      IMAGING:  XR hip/femur: pending  CT pelv: hypertrophic nonunion of left basicervical 3-4 part femoral neck fx     Assessment/Plan:  89y Male with Left hip osteoarthritic, 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 Bonilla    -Afebrile, no fevers/chills, neg white ct   -Pain control as needed  -DVT ppx per primary   -WBAT   -No acute orthopaedic surgical intervention indicated  -Pain control, NSAIDs (If tolerated), rest, ice  -FU ESR/CRP in setting of mal/non-union  -PT/OT  -FU outpatient w/ Dr. Bonilla for continued care and possible revision sx vs conversion to JASMEET   -Orthopaedically stable for dc after ESR/CRP received in lab for review on outpatient follow up  -Will discuss with attending, and advise if plan changes

## 2023-06-05 NOTE — ED ADULT NURSE NOTE - NSFALLHARMRISKINTERV_ED_ALL_ED
Assistance OOB with selected safe patient handling equipment if applicable/Assistance with ambulation/Communicate risk of Fall with Harm to all staff, patient, and family/Monitor gait and stability/Provide visual cue: red socks, yellow wristband, yellow gown, etc/Reinforce activity limits and safety measures with patient and family/Bed in lowest position, wheels locked, appropriate side rails in place/Call bell, personal items and telephone in reach/Instruct patient to call for assistance before getting out of bed/chair/stretcher/Non-slip footwear applied when patient is off stretcher/[place] to call system/Physically safe environment - no spills, clutter or unnecessary equipment/Purposeful Proactive Rounding/Room/bathroom lighting operational, light cord in reach

## 2023-06-05 NOTE — PHYSICAL THERAPY INITIAL EVALUATION ADULT - BALANCE TRAINING, PT EVAL
PATIENT RETURNED CALL TO [facility].  PLEASE CALL HER BACK.   Patient will increase balance 1/2 grade in 2 weeks in order to increase safety at home

## 2023-06-05 NOTE — ED ADULT NURSE NOTE - OBJECTIVE STATEMENT
Pt. received alert and oriented x3 with chief complaint of left hip pain radiating down left leg for two days. Pt. denies fall or injury to affected area. Pt. states hip repair surgery for previous replacement in april of 2022. Pt. presents w/ stage 1 pressure ulcer to sacral area.

## 2023-06-05 NOTE — H&P ADULT - ATTENDING COMMENTS
Hospitalist attending  I have personally seen and examined the patient.  I fully participated in the care of this patient.  I have made amendments to the documentation where necessary, and agree with the history, physical exam, and plan as documented by the resident.    Vital Signs Last 24 Hrs  T(F): 98 (05 Jun 2023 18:58), Max: 98.2 (05 Jun 2023 15:51)  HR: 85 (05 Jun 2023 18:58) (67 - 85)  BP: 146/84 (05 Jun 2023 18:58) (146/84 - 186/95)  RR: 18 (05 Jun 2023 18:58) (18 - 19)  SpO2: 97% (05 Jun 2023 18:58) (97% - 98%)    Physical Exam:  Constitutional: NAD, awake and alert  Respiratory: cta b/l no wheezing no rhonchi  Cardiovascular: +systolic murmur, +s1/s2 no edema b/l le  Gastrointestinal: soft nt nd bs+  Vascular: 2+ peripheral pulses  Neurological: A/O x 3, no focal deficits  Musculoskeletal: LT hip ROM limited 2/2 pain, DP/PT pulses intact    89M w/ PMHx CAD s/p CABG x4, HTN, HLD, BPH, left hip fracture presenting with one week of worsening L hip pain radiating to the back and R hip with increasingly difficulty with ambulation. Admitted for KASHIF placement.    - X-ray LT hip w/ pelvis w/ LT subcapital femoral fracture stabilized by shannon and neck screw. X-Ray LT femur- unremarkable.   - Appreciate ortho recs- ESR WNL. F/u CRP.   - Pain control with tylenol PRN  - DVT ppx w/ lovenox  - Appreciate PT recs- KASHIF

## 2023-06-05 NOTE — H&P ADULT - PROBLEM SELECTOR PLAN 3
/80 on admission recently stopped on anti-HTN medications by PCP  - Continue home XXXX  - Monitor hemodynamics  - DASH/TLC diet /80 on admission recently stopped on anti-HTN medications in May by PCP for low BP  - BP may be elevated in setting of pain  - Restart home metoprolol 25mg, hydrochlorothiazide 12.5 qD (MWF) w/ hold parameters   - Monitor hemodynamics  - DASH/TLC diet

## 2023-06-05 NOTE — H&P ADULT - NSHPPHYSICALEXAM_GEN_ALL_CORE
GENERAL:  Not in acute distress, lying in bed comfortably  HEENT:  PERRLA  CHEST:  CTAB  HEART: +Soft systolic murmur. Regular rate and rhythm.  ABDOMEN:  Soft, non-tender, non-distended,  EXTREMITIES:  no cyanosis, no LE edema  SKIN:  Warm, well perfused  NEURO:  Alert, Conversing appropriately, A&Ox3  MSK: R hip active motion limited, pain with limited ROM  PSYCH: Not emotionally labile, appropriate affect GENERAL:  Not in acute distress, lying in bed comfortably  HEENT:  PERRLA  CHEST:  CTAB  HEART: +Soft systolic murmur. Regular rate and rhythm.  ABDOMEN:  Soft, non-tender, non-distended,  EXTREMITIES:  no cyanosis, no LE edema  SKIN:  Warm, well perfused  NEURO:  Alert, Conversing appropriately, A&Ox3  MSK: +L hip active motion limited, pain with limited ROM  PSYCH: Not emotionally labile, appropriate affect

## 2023-06-05 NOTE — ED ADULT NURSE NOTE - NS ED NURSE REPORT GIVEN TO FT
I offered to discuss advanced care planning, including how to pick a person who would make decisions for you if you were unable to make them for yourself, called a health care power of , and what kind of decisions you might make such as use of life sustaining treatments such as ventilators and tube feeding when faced with a life limiting illness recorded on a living will that they will need to know. (How you want to be cared for as you near the end of your natural life)     X Patient is interested in learning more about how to make advanced directives.  I provided them paperwork and offered to discuss this with them.   heidy

## 2023-06-05 NOTE — H&P ADULT - HISTORY OF PRESENT ILLNESS
89 year old male with PMHx CAD s/p CABG x4, HTN, HLD, BPH, left hip fracture pw left hip pain and difficulty walking, no recent trauma.  pain in left hip when ranging but able to fully range, no deformity,    IN THE ED:  Temp 98F, HR 67, /80, RR 19, 98 SpO2 on room air  Labs significant for: ,   Imaging  CXR: No acute findings, increased interstitial markings at the left base with no consolidation  XR femur: No acute radiographic osseous pathology.  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  EKG: Sinus bradycardia 59, RBBB  Received in ED:  Consults: Ortho, PT   89 year old male with PMHx CAD s/p CABG x4, HTN, HLD, BPH, left hip fracture pw left hip pain and difficulty walking, no recent trauma.  pain in left hip when ranging but able to fully range, no deformity,    IN THE ED:  Temp 98F, HR 67, /80, RR 19, 98 SpO2 on room air  Labs significant for: ,   Imaging  CXR: No acute findings, increased interstitial markings at the left base with no consolidation  XR femur: No acute radiographic osseous pathology.  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  EKG: Sinus bradycardia 59, RBBB  Received in ED: s/p tylenol PO, HCTZ 12.5mg  Consults: Ortho, PT   89M w/ PMHx CAD s/p CABG x4, HTN, HLD, BPH, left hip fracture presenting with one week of worsening L hip pain radiating to the back and R hip with increasingly difficulty with ambulation. Denies recent trauma, fever/chills. Pt states currently pain is reduced to 4/10 pain after tylenol in the ED. However, occasional he experiences a 8/10 shooting pain that radiates to his back and R hip.    IN THE ED:  Temp 98F, HR 67, /80, RR 19, 98 SpO2 on room air  Labs significant for:   Imaging  CXR: No acute findings, increased interstitial markings at the left base with no consolidation  XR femur: No acute radiographic osseous pathology.  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  EKG: Sinus bradycardia 59, RBBB  Received in ED: s/p tylenol PO, HCTZ 12.5mg  Consults: Ortho, PT

## 2023-06-05 NOTE — ED PROVIDER NOTE - OBJECTIVE STATEMENT
89 yo M PMHx HTN, HLD, CAD presents to ED with wife co atraumatic left hip pain and inability to ambulate x 3 days. denies trauma/fall, chest pain, back pain, headache, numbness/tingling, fever/chills.

## 2023-06-05 NOTE — H&P ADULT - PROBLEM SELECTOR PLAN 1
L hip fracture pw left hip pain and difficulty walking, no recent trauma w/ pain in left hip when ranging but able to fully range, no deformity  - CXR: No acute findings, increased interstitial markings at the left base with no consolidation  - XR femur: No acute radiographic osseous pathology.  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 Bonilla, Orthopaedically stable for dc after ESR/CRP received in lab for review on outpatient follow up, FU outpatient w/ Dr. Bonilla for continued care and possible revision sx vs conversion to JASMEET   - PT: KASHIF Hx of L hip fracture pw left hip pain and difficulty walking, no recent trauma w/ pain in left hip when ranging but able to fully range, no deformity  - XR femur: No acute radiographic osseous pathology.  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 Bonilla, Orthopaedically stable for dc after ESR/CRP received in lab for review on outpatient follow up, FU outpatient w/ Dr. Bonilla for continued care and possible revision sx vs conversion to JASMEET   - Pain well controlled on tylenol PRN currently, continue  - PT: KASHIF

## 2023-06-05 NOTE — H&P ADULT - NSHPSOCIALHISTORY_GEN_ALL_CORE
Lives at home with wife  Needs assistance with ADLs  Ambulates with great difficulty now  Social Alcohol use  30 year smoking hx 1-1.5 pack year hx, quit 50 years ago.  Denies drug use

## 2023-06-05 NOTE — H&P ADULT - ASSESSMENT
89M w/ PMHx CAD s/p CABG x4, HTN, HLD, BPH, left hip fracture presenting with one week of worsening L hip pain radiating to the back and R hip with increasingly difficulty with ambulation. Admitted for KASHIF placement.

## 2023-06-05 NOTE — ED PROVIDER NOTE - CLINICAL SUMMARY MEDICAL DECISION MAKING FREE TEXT BOX
89 year old male with PMHx CAD s/p CABG x4, HTN, HLD, BPH, left hip fracture pw left hip pain and difficulty walking, no recent trauma   pain in left hip when ranging but able to fully range, no deformity, will check xrays, cTs, PT eval

## 2023-06-05 NOTE — PHYSICAL THERAPY INITIAL EVALUATION ADULT - ADDITIONAL COMMENTS
Patient lives in private home with spouse, 1 LARS then resides on main floor. Patient was able to ambulate with RW independently for short distances and wife assists wit ADLS as needed.

## 2023-06-05 NOTE — ED ADULT NURSE NOTE - CHIEF COMPLAINT QUOTE
----- Message from TEDDY Morejon sent at 9/28/2020  5:26 PM CDT -----  Vitamin-D 25 hydroxy level low.  25.7 please start 50,000 international units once a week for 3 months.  Repeat lab in 3 months.   Patient A/Ox3 with clear speech. BIBA from home for atraumatic back pain x1 week.

## 2023-06-05 NOTE — PHYSICAL THERAPY INITIAL EVALUATION ADULT - PERTINENT HX OF CURRENT PROBLEM, REHAB EVAL
89y Male presents with atraumatic left hip pain for 3-4 days. Patient reports he has had limited ambulatory ability 2/2 exacerbation of pain. Lives with wife/daughter who were concerned. Denies falls, new trauma, fever, chills, recent illness. Minimal community ambulator, walks at home w/ walker. Denies numbness/tingling in the affected extremity. Denies head strike/LOC/other orthopedic injuries at this time. Reports not following up w/ Dr. Bonilla after his surgery.

## 2023-06-06 LAB
ANION GAP SERPL CALC-SCNC: 4 MMOL/L — LOW (ref 5–17)
BUN SERPL-MCNC: 12 MG/DL — SIGNIFICANT CHANGE UP (ref 7–23)
CALCIUM SERPL-MCNC: 8.6 MG/DL — SIGNIFICANT CHANGE UP (ref 8.5–10.1)
CHLORIDE SERPL-SCNC: 105 MMOL/L — SIGNIFICANT CHANGE UP (ref 96–108)
CO2 SERPL-SCNC: 30 MMOL/L — SIGNIFICANT CHANGE UP (ref 22–31)
CREAT SERPL-MCNC: 0.73 MG/DL — SIGNIFICANT CHANGE UP (ref 0.5–1.3)
CRP SERPL-MCNC: 11 MG/L — HIGH
EGFR: 87 ML/MIN/1.73M2 — SIGNIFICANT CHANGE UP
GLUCOSE SERPL-MCNC: 92 MG/DL — SIGNIFICANT CHANGE UP (ref 70–99)
HCT VFR BLD CALC: 39.7 % — SIGNIFICANT CHANGE UP (ref 39–50)
HGB BLD-MCNC: 13.4 G/DL — SIGNIFICANT CHANGE UP (ref 13–17)
MCHC RBC-ENTMCNC: 33.8 GM/DL — SIGNIFICANT CHANGE UP (ref 32–36)
MCHC RBC-ENTMCNC: 34.6 PG — HIGH (ref 27–34)
MCV RBC AUTO: 102.6 FL — HIGH (ref 80–100)
NRBC # BLD: 0 /100 WBCS — SIGNIFICANT CHANGE UP (ref 0–0)
PLATELET # BLD AUTO: 134 K/UL — LOW (ref 150–400)
POTASSIUM SERPL-MCNC: 3.5 MMOL/L — SIGNIFICANT CHANGE UP (ref 3.5–5.3)
POTASSIUM SERPL-SCNC: 3.5 MMOL/L — SIGNIFICANT CHANGE UP (ref 3.5–5.3)
RBC # BLD: 3.87 M/UL — LOW (ref 4.2–5.8)
RBC # FLD: 12.9 % — SIGNIFICANT CHANGE UP (ref 10.3–14.5)
SODIUM SERPL-SCNC: 139 MMOL/L — SIGNIFICANT CHANGE UP (ref 135–145)
WBC # BLD: 8.62 K/UL — SIGNIFICANT CHANGE UP (ref 3.8–10.5)
WBC # FLD AUTO: 8.62 K/UL — SIGNIFICANT CHANGE UP (ref 3.8–10.5)

## 2023-06-06 PROCEDURE — 99232 SBSQ HOSP IP/OBS MODERATE 35: CPT

## 2023-06-06 RX ORDER — NYSTATIN CREAM 100000 [USP'U]/G
1 CREAM TOPICAL
Refills: 0 | Status: DISCONTINUED | OUTPATIENT
Start: 2023-06-06 | End: 2023-06-08

## 2023-06-06 RX ADMIN — NYSTATIN CREAM 1 APPLICATION(S): 100000 CREAM TOPICAL at 05:31

## 2023-06-06 RX ADMIN — ATORVASTATIN CALCIUM 20 MILLIGRAM(S): 80 TABLET, FILM COATED ORAL at 21:12

## 2023-06-06 RX ADMIN — NYSTATIN CREAM 1 APPLICATION(S): 100000 CREAM TOPICAL at 16:55

## 2023-06-06 RX ADMIN — Medication 25 MILLIGRAM(S): at 05:31

## 2023-06-06 RX ADMIN — Medication 81 MILLIGRAM(S): at 11:27

## 2023-06-06 RX ADMIN — Medication 10 MILLIGRAM(S): at 21:11

## 2023-06-06 RX ADMIN — ENOXAPARIN SODIUM 40 MILLIGRAM(S): 100 INJECTION SUBCUTANEOUS at 21:10

## 2023-06-06 RX ADMIN — TAMSULOSIN HYDROCHLORIDE 0.4 MILLIGRAM(S): 0.4 CAPSULE ORAL at 21:11

## 2023-06-06 RX ADMIN — ESCITALOPRAM OXALATE 5 MILLIGRAM(S): 10 TABLET, FILM COATED ORAL at 11:27

## 2023-06-06 NOTE — PATIENT PROFILE ADULT - FALL HARM RISK - HARM RISK INTERVENTIONS

## 2023-06-06 NOTE — CARE COORDINATION ASSESSMENT. - NSCAREPROVIDERS_GEN_ALL_CORE_FT
CARE PROVIDERS:  Accepting Physician: Prabhjot Emanuel  Administration: Niurka Lezama  Administration: Torsten Diaz  Administration: Desiree Rangel  Admitting: iNle Joy  Attending: Nile Joy  Case Management: Charlene Webber  Consultant: Weil, Patricia  Consultant: Suraj Estrada  Consultant: Aquilino Lyman  Covering Team: Ezio George  Covering Team: Annie Montenegro  Covering Team: Chi Padron  Covering Team: Torsten Johnson  ED ACP: Chapis Landaverde  ED Attending: Yesenia Moss ED Nurse: Ishmael Go  Emergency Medicine: Landy Gill  Nurse: Daniella Medrano  Ordered: ServiceAccount, SCMMLM  Ordered: ServiceAccount, SCMMLM  Ordered: ADM, User  Outpatient Provider: Avery Puente  Outpatient Provider: Chi Petersen  Outpatient Provider: Haseeb De La Rosa  PCA/Nursing Assistant: Lorna White  PCA/Nursing Assistant: Irasema Simon  Physical Therapy: Anika Abernathy  Primary Team: Juancho Padilla  Primary Team: Franck Bland  Primary Team: Shannan Oconnell  Primary Team: Nile Joy  Primary Team: Haseeb Mahtur  Registered Dietitian: Tootie Ontiveros  Respiratory Therapy: Brittany Rogers  : Georgina Menendez

## 2023-06-06 NOTE — CARE COORDINATION ASSESSMENT. - NSPASTMEDSURGHISTORY_GEN_ALL_CORE_FT
PAST MEDICAL & SURGICAL HISTORY:  HLD (hyperlipidemia)      HTN (hypertension)      CAD (coronary artery disease)      History of cholecystectomy      S/P CABG x 4

## 2023-06-07 LAB — SARS-COV-2 RNA SPEC QL NAA+PROBE: SIGNIFICANT CHANGE UP

## 2023-06-07 PROCEDURE — 99232 SBSQ HOSP IP/OBS MODERATE 35: CPT

## 2023-06-07 RX ADMIN — ENOXAPARIN SODIUM 40 MILLIGRAM(S): 100 INJECTION SUBCUTANEOUS at 21:26

## 2023-06-07 RX ADMIN — TAMSULOSIN HYDROCHLORIDE 0.4 MILLIGRAM(S): 0.4 CAPSULE ORAL at 21:26

## 2023-06-07 RX ADMIN — ATORVASTATIN CALCIUM 20 MILLIGRAM(S): 80 TABLET, FILM COATED ORAL at 21:26

## 2023-06-07 RX ADMIN — NYSTATIN CREAM 1 APPLICATION(S): 100000 CREAM TOPICAL at 19:12

## 2023-06-07 RX ADMIN — Medication 325 MILLIGRAM(S): at 19:13

## 2023-06-07 RX ADMIN — Medication 81 MILLIGRAM(S): at 12:17

## 2023-06-07 RX ADMIN — Medication 10 MILLIGRAM(S): at 21:26

## 2023-06-07 RX ADMIN — ESCITALOPRAM OXALATE 5 MILLIGRAM(S): 10 TABLET, FILM COATED ORAL at 12:17

## 2023-06-07 RX ADMIN — Medication 25 MILLIGRAM(S): at 05:04

## 2023-06-07 RX ADMIN — NYSTATIN CREAM 1 APPLICATION(S): 100000 CREAM TOPICAL at 05:04

## 2023-06-07 NOTE — PROGRESS NOTE ADULT - PROBLEM SELECTOR PLAN 4
- Continue home tamsulosin 0.4mg qD w/ hold parameters
- Continue home tamsulosin 0.4mg qD w/ hold parameters

## 2023-06-07 NOTE — PROGRESS NOTE ADULT - PROBLEM SELECTOR PLAN 1
Hx of L hip fracture pw left hip pain and difficulty walking, no recent trauma w/ pain in left hip when ranging but able to fully range, no deformity  - XR femur: No acute radiographic osseous pathology.  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 Bonilla, Orthopaedically stable for dc after ESR/CRP received in lab for review on outpatient follow up, FU outpatient w/ Dr. Bonilla for continued care and possible revision sx vs conversion to JASMEET   - Pain well controlled on tylenol PRN currently, continue  - PT: KASHIF
Hx of L hip fracture pw left hip pain and difficulty walking, no recent trauma w/ pain in left hip when ranging but able to fully range, no deformity  - XR femur: No acute radiographic osseous pathology.  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 Bonilla, FU outpatient w/ Dr. Bonilla for continued care and possible revision sx vs conversion to JASMEET   - Pain management as current   - PT: KASHIF

## 2023-06-07 NOTE — PROGRESS NOTE ADULT - PROBLEM SELECTOR PLAN 2
s/p CABG x4  - Continue home asprin and statin.  - BP Control  - Dash/TLC diet
s/p CABG x4  - Continue home asprin and statin.  - BP Control  - Dash/TLC diet

## 2023-06-07 NOTE — PROGRESS NOTE ADULT - TIME BILLING
direct patient care including but not limited to reviewing chart, medications ,laboratory data, imaging reports, discussion of plan of care with consultants on the case, coordination of care with multidisciplinary team involved in the case and discussion of plan with patient.  Patient and family agreeable to plan of care and verbalized understanding the anticipated hospital course and treatment plan.
I personally conducted a physical examination of the patient. I personally gathered the patient's history. I personally discussed the plan of care with the patient. The questions and concerns were addressed to the best of my ability. The patient is in agreement with the listed treatment plan.    D/C planning to [facility].

## 2023-06-07 NOTE — PROGRESS NOTE ADULT - ASSESSMENT
89M w/ PMHx CAD s/p CABG x4, HTN, HLD, BPH, left hip fracture presenting with one week of worsening L hip pain radiating to the back and R hip with increasingly difficulty with ambulation. Admitted for KASHIF placement.
89M w/ PMHx CAD s/p CABG x4, HTN, HLD, BPH, left hip fracture presenting with one week of worsening L hip pain radiating to the back and R hip with increasingly difficulty with ambulation. Admitted for KASHIF placement.

## 2023-06-07 NOTE — SOCIAL WORK PROGRESS NOTE - NSSWPROGRESSNOTE_GEN_ALL_CORE
nicolasa and supporting documentation sent to mary russell  for review. ant dc for tomorrow due to medicare 3 night stay. sw to continue to follow for ant plan for sayda.

## 2023-06-07 NOTE — PROGRESS NOTE ADULT - PROBLEM SELECTOR PLAN 3
/80 on admission recently stopped on anti-HTN medications in May by PCP for low BP  - BP may be elevated in setting of pain  - Restart home metoprolol 25mg, hydrochlorothiazide 12.5 qD (MWF) w/ hold parameters   - Monitor hemodynamics  - DASH/TLC diet
/80 on admission recently stopped on anti-HTN medications in May by PCP for low BP  - BP may be elevated in setting of pain  - Restart home metoprolol 25mg, hydrochlorothiazide 12.5 qD (MWF) w/ hold parameters   - Monitor hemodynamics  - DASH/TLC diet

## 2023-06-08 VITALS
TEMPERATURE: 97 F | RESPIRATION RATE: 18 BRPM | DIASTOLIC BLOOD PRESSURE: 74 MMHG | HEART RATE: 62 BPM | OXYGEN SATURATION: 95 % | SYSTOLIC BLOOD PRESSURE: 117 MMHG

## 2023-06-08 PROCEDURE — 73502 X-RAY EXAM HIP UNI 2-3 VIEWS: CPT

## 2023-06-08 PROCEDURE — 76376 3D RENDER W/INTRP POSTPROCES: CPT

## 2023-06-08 PROCEDURE — 70450 CT HEAD/BRAIN W/O DYE: CPT | Mod: MA

## 2023-06-08 PROCEDURE — 81001 URINALYSIS AUTO W/SCOPE: CPT

## 2023-06-08 PROCEDURE — 36415 COLL VENOUS BLD VENIPUNCTURE: CPT

## 2023-06-08 PROCEDURE — 99285 EMERGENCY DEPT VISIT HI MDM: CPT

## 2023-06-08 PROCEDURE — 97116 GAIT TRAINING THERAPY: CPT

## 2023-06-08 PROCEDURE — 80048 BASIC METABOLIC PNL TOTAL CA: CPT

## 2023-06-08 PROCEDURE — 97530 THERAPEUTIC ACTIVITIES: CPT

## 2023-06-08 PROCEDURE — 72131 CT LUMBAR SPINE W/O DYE: CPT | Mod: MA

## 2023-06-08 PROCEDURE — 99233 SBSQ HOSP IP/OBS HIGH 50: CPT

## 2023-06-08 PROCEDURE — 85652 RBC SED RATE AUTOMATED: CPT

## 2023-06-08 PROCEDURE — 80053 COMPREHEN METABOLIC PANEL: CPT

## 2023-06-08 PROCEDURE — 73552 X-RAY EXAM OF FEMUR 2/>: CPT

## 2023-06-08 PROCEDURE — 85027 COMPLETE CBC AUTOMATED: CPT

## 2023-06-08 PROCEDURE — 72192 CT PELVIS W/O DYE: CPT | Mod: MA

## 2023-06-08 PROCEDURE — 97162 PT EVAL MOD COMPLEX 30 MIN: CPT

## 2023-06-08 PROCEDURE — 93005 ELECTROCARDIOGRAM TRACING: CPT

## 2023-06-08 PROCEDURE — 71045 X-RAY EXAM CHEST 1 VIEW: CPT

## 2023-06-08 PROCEDURE — 87635 SARS-COV-2 COVID-19 AMP PRB: CPT

## 2023-06-08 PROCEDURE — 85025 COMPLETE CBC W/AUTO DIFF WBC: CPT

## 2023-06-08 PROCEDURE — 86140 C-REACTIVE PROTEIN: CPT

## 2023-06-08 RX ORDER — LANOLIN ALCOHOL/MO/W.PET/CERES
1 CREAM (GRAM) TOPICAL
Refills: 0 | DISCHARGE

## 2023-06-08 RX ORDER — ACETAMINOPHEN 500 MG
2 TABLET ORAL
Qty: 0 | Refills: 0 | DISCHARGE
Start: 2023-06-08

## 2023-06-08 RX ADMIN — NYSTATIN CREAM 1 APPLICATION(S): 100000 CREAM TOPICAL at 05:22

## 2023-06-08 RX ADMIN — Medication 81 MILLIGRAM(S): at 12:48

## 2023-06-08 RX ADMIN — ESCITALOPRAM OXALATE 5 MILLIGRAM(S): 10 TABLET, FILM COATED ORAL at 12:48

## 2023-06-08 NOTE — PROGRESS NOTE ADULT - SUBJECTIVE AND OBJECTIVE BOX
Patient is a 89y old  Male who presents with a chief complaint of Hip pain, difficulty with ambulation (06 Jun 2023 11:10)      Subjective:  INTERVAL HPI/OVERNIGHT EVENTS: Patient seen and examined at bedside.  Patient still c/o left hip pain upon ambulation   MEDICATIONS  (STANDING):  aspirin enteric coated 81 milliGRAM(s) Oral daily  atorvastatin 20 milliGRAM(s) Oral at bedtime  enoxaparin Injectable 40 milliGRAM(s) SubCutaneous every 24 hours  escitalopram 5 milliGRAM(s) Oral daily  ferrous    sulfate 325 milliGRAM(s) Oral <User Schedule>  hydrochlorothiazide 12.5 milliGRAM(s) Oral <User Schedule>  melatonin 10 milliGRAM(s) Oral at bedtime  metoprolol succinate ER 25 milliGRAM(s) Oral daily  nystatin Powder 1 Application(s) Topical two times a day  tamsulosin 0.4 milliGRAM(s) Oral at bedtime    MEDICATIONS  (PRN):  acetaminophen     Tablet .. 650 milliGRAM(s) Oral every 6 hours PRN Temp greater or equal to 38C (100.4F), Mild Pain (1 - 3)  aluminum hydroxide/magnesium hydroxide/simethicone Suspension 30 milliLiter(s) Oral every 4 hours PRN Dyspepsia  ondansetron Injectable 4 milliGRAM(s) IV Push every 8 hours PRN Nausea and/or Vomiting      Allergies    No Known Allergies    Intolerances        REVIEW OF SYSTEMS:  CONSTITUTIONAL: No fever or chills  HEENT:  No headache, no sore throat  RESPIRATORY: No cough or shortness of breath  CARDIOVASCULAR: No chest pain or palpitations  GASTROINTESTINAL: No abd pain, nausea, vomiting, or diarrhea      Objective:  Vital Signs Last 24 Hrs  T(C): 36.4 (07 Jun 2023 12:35), Max: 36.9 (07 Jun 2023 04:48)  T(F): 97.5 (07 Jun 2023 12:35), Max: 98.4 (07 Jun 2023 04:48)  HR: 69 (07 Jun 2023 12:35) (66 - 100)  BP: 129/68 (07 Jun 2023 12:35) (118/64 - 167/71)  BP(mean): --  RR: 18 (07 Jun 2023 12:35) (18 - 18)  SpO2: 95% (07 Jun 2023 12:35) (93% - 95%)    Parameters below as of 07 Jun 2023 12:35  Patient On (Oxygen Delivery Method): room air        GENERAL: NAD, lying in bed   HEAD:  Normocephalic  EYES:  conjunctiva and sclera clear  ENT: Moist mucous membranes  NECK: Supple  CHEST/LUNG: Clear to auscultation bilaterally; No rales or rhonchi; no wheezing. Unlabored respirations  HEART: Regular rate and rhythm; S1S2+  ABDOMEN: Bowel sounds present; Soft, Nontender, Nondistended.   EXTREMITIES:  + distal Peripheral Pulses;  No cyanosis, or edema  NERVOUS SYSTEM:  Alert & Oriented X3;  No gross focal deficits   MSK: moves all extremities, LLE hip Sx site healing well, ROM and strength decreased   SKIN: No rashes     LABS:      Ca    8.6        06 Jun 2023 06:18          CAPILLARY BLOOD GLUCOSE              RADIOLOGY & ADDITIONAL TESTS:    Personally reviewed.     Consultant(s) Notes Reviewed:  [x] YES  [ ] NO    Plan of care discussed with patient ; all questions answered  
[facility]    CHIEF COMPLAINT: Patient is a 89y old  Male who presents with a chief complaint of Hip pain, difficulty with ambulation (08 Jun 2023 08:41)      Follow Up: [ ] Chest Pain      [ ] Dyspnea     [ ] Palpitations    [ ] Atrial Fibrillation     [ ] Ventricular Dysrhythmia    [ ] Abnormal EKG                      [ ] Abnormal Cardiac Enzymes     [ ] Valvular Disease    HPI:  89M w/ PMHx CAD s/p CABG x4, HTN, HLD, BPH, left hip fracture presenting with one week of worsening L hip pain radiating to the back and R hip with increasingly difficulty with ambulation. Denies recent trauma, fever/chills. Pt states currently pain is reduced to 4/10 pain after tylenol in the ED. However, occasional he experiences a 8/10 shooting pain that radiates to his back and R hip.    IN THE ED:  Temp 98F, HR 67, /80, RR 19, 98 SpO2 on room air  Labs significant for:   Imaging  CXR: No acute findings, increased interstitial markings at the left base with no consolidation  XR femur: No acute radiographic osseous pathology.  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  EKG: Sinus bradycardia 59, RBBB  Received in ED: s/p tylenol PO, HCTZ 12.5mg  Consults: Ortho, PT   (05 Jun 2023 19:29)    PAST MEDICAL & SURGICAL HISTORY:  CAD (coronary artery disease)      HTN (hypertension)      HLD (hyperlipidemia)      S/P CABG x 4      History of cholecystectomy        MEDICATIONS  (STANDING):  aspirin enteric coated 81 milliGRAM(s) Oral daily  atorvastatin 20 milliGRAM(s) Oral at bedtime  enoxaparin Injectable 40 milliGRAM(s) SubCutaneous every 24 hours  escitalopram 5 milliGRAM(s) Oral daily  ferrous    sulfate 325 milliGRAM(s) Oral <User Schedule>  hydrochlorothiazide 12.5 milliGRAM(s) Oral <User Schedule>  melatonin 10 milliGRAM(s) Oral at bedtime  metoprolol succinate ER 25 milliGRAM(s) Oral daily  nystatin Powder 1 Application(s) Topical two times a day  tamsulosin 0.4 milliGRAM(s) Oral at bedtime    MEDICATIONS  (PRN):  acetaminophen     Tablet .. 650 milliGRAM(s) Oral every 6 hours PRN Temp greater or equal to 38C (100.4F), Mild Pain (1 - 3)  aluminum hydroxide/magnesium hydroxide/simethicone Suspension 30 milliLiter(s) Oral every 4 hours PRN Dyspepsia  ondansetron Injectable 4 milliGRAM(s) IV Push every 8 hours PRN Nausea and/or Vomiting    Allergies    No Known Allergies    Intolerances        REVIEW OF SYSTEMS:    CONSTITUTIONAL: No weakness, fevers or chills.   EYES/ENT: No visual changes;    NECK: No pain or stiffness  RESPIRATORY: No cough, wheezing, No shortness of breath  CARDIOVASCULAR: No chest pain or palpitations  GASTROINTESTINAL: No abdominal pain, or hematochezia.  GENITOURINARY: No dysuria orhematuria  NEUROLOGICAL: No numbness or weakness  SKIN: No itching, burning, rashes  All other review of systems is negative unless indicated above    Vital Signs Last 24 Hrs  T(C): 36.4 (08 Jun 2023 04:58), Max: 36.6 (07 Jun 2023 21:21)  T(F): 97.6 (08 Jun 2023 04:58), Max: 97.8 (07 Jun 2023 21:21)  HR: 57 (08 Jun 2023 06:50) (57 - 69)  BP: 112/63 (08 Jun 2023 04:58) (112/63 - 139/57)  BP(mean): --  RR: 18 (08 Jun 2023 04:58) (18 - 18)  SpO2: 92% (08 Jun 2023 04:58) (92% - 95%)    Parameters below as of 08 Jun 2023 04:58  Patient On (Oxygen Delivery Method): room air      I&O's Summary    08 Jun 2023 07:01  -  08 Jun 2023 12:07  --------------------------------------------------------  IN: 240 mL / OUT: 0 mL / NET: 240 mL        PHYSICAL EXAM:  Constitutional: NAD  Neurological: Alert, no focal deficits  HEENT: no JVD, EOMI  Cardiovascular: Regular, S1 and S2, no murmur  Pulmonary: breath sounds bilaterally  Gastrointestinal: Bowel Sounds present, soft, nontender  EXT:  no peripheral edema  Skin: No rashes.  Psych:  Mood calm  LABS: All Labs Reviewed:                  CT Head No Cont:   ACC: [id number] EXAM:  CT BRAIN   ORDERED BY: TRINO ENRIQUEZ     ACC: [id number] EXAM:  CT LUMBAR SPINE   ORDERED BY: TRINO ENRIQUEZ     PROCEDURE DATE:  06/05/2023          INTERPRETATION:  CLINICAL STATEMENT: Trauma..    TECHNIQUE: CT of the headand lumbar spine were performed without IV   contrast. 3-D/MIP images obtained    COMPARISON: None.    FINDINGS:  Head:  There is moderate diffuse parenchymal volume loss.    There are areas of low attenuation in the periventricular white matter   likely related to mild chronic microvascular ischemic changes.    There is no acute intracranial hemorrhage, parenchymal mass, mass effect   or midline shift. There is no acute territorial infarct. Prominent   ventricles likely related to central atrophy. Partial empty sella noted    The cranium is intact. Mucosal thickening and retention cyst/polyps noted   in the paranasal sinuses.    Lumbar spine:  Diffuse osteopenia limits evaluation. Status post vertebroplasty with   cement material at T12, L1 and L4 with compression deformities.    Moderate compression deformity of L2 vertebral body appears chronic.    Sagittal alignment is intact    Deformity lower coccyx likely chronic    Evaluation of the spinal canal is limited on a CT exam.  Multilevel   degenerative changes noted resulting in multilevel spinal canal stenosis   and neural foraminal narrowing.        IMPRESSION:    No gross acute intracranial hemorrhage    Diffuse osteopenia limits evaluation of the lumbar spine. No gross acute   fracture. If pain persists, follow-up MRI exam recommended (if no   contraindication).    --- End of Report ---            NATI JO MD; Attending Radiologist  This document has been electronically signed. Jun 5 2023  1:43PM (06-05-23 @ 13:29)  Xray Chest 1 View- PORTABLE-Urgent:   ACC: [id number] EXAM:  XR CHEST PORTABLE URGENT 1V   ORDERED BY: TRINO ENRIQUEZ     PROCEDURE DATE:  06/05/2023          INTERPRETATION:  Clinical History: 89-year-old male, admission.    Portable view of the chest is compared to 6/17/2022 and iscorrelated   with a concurrent lumbar CT.    FINDINGS: Normal cardiac silhouette and normal pulmonary vasculature with   no consolidation, effusion, pneumothorax.    No acute osseous findings. 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    Post sternotomy/CABG.    IMPRESSION:    No acute findings, increased interstitial markings at the left base with   no consolidation    --- End of Report ---            BALJEET MOY SIOMARA DO; Attending Radiologist  This document has been electronically signed. Jun 5 2023  4:14PM (06-05-23 @ 12:45)  12 Lead ECG:   Ventricular Rate 59 BPM    Atrial Rate 59 BPM    P-R Interval 162 ms    QRS Duration 148 ms    Q-T Interval 484 ms    QTC Calculation(Bazett) 479 ms    P Axis 53 degrees    R Axis -18 degrees    T Axis -7 degrees    Diagnosis Line Sinus bradycardia  Right bundle branch block  Abnormal ECG  When compared with ECG of 16-JUN-2022 23:26,  Left anterior fascicular block is no longer present  Confirmed by DUNIA HORTON ([id number]) on 6/5/2023 4:56:34 PM (06-05-23 @ 12:12)    [facility]    CHIEF COMPLAINT: Patient is a 89y old  Male who presents with a chief complaint of Hip pain, difficulty with ambulation (08 Jun 2023 08:41)      Follow Up: [ ] Chest Pain      [ ] Dyspnea     [ ] Palpitations    [ ] Atrial Fibrillation     [ ] Ventricular Dysrhythmia    [ ] Abnormal EKG                      [ ] Abnormal Cardiac Enzymes     [ ] Valvular Disease    HPI:  89M w/ PMHx CAD s/p CABG x4, HTN, HLD, BPH, left hip fracture presenting with one week of worsening L hip pain radiating to the back and R hip with increasingly difficulty with ambulation. Denies recent trauma, fever/chills. Pt states currently pain is reduced to 4/10 pain after tylenol in the ED. However, occasional he experiences a 8/10 shooting pain that radiates to his back and R hip.    IN THE ED:  Temp 98F, HR 67, /80, RR 19, 98 SpO2 on room air  Labs significant for:   Imaging  CXR: No acute findings, increased interstitial markings at the left base with no consolidation  XR femur: No acute radiographic osseous pathology.  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  EKG: Sinus bradycardia 59, RBBB  Received in ED: s/p tylenol PO, HCTZ 12.5mg  Consults: Ortho, PT   (05 Jun 2023 19:29)    PAST MEDICAL & SURGICAL HISTORY:  CAD (coronary artery disease)      HTN (hypertension)      HLD (hyperlipidemia)      S/P CABG x 4      History of cholecystectomy        MEDICATIONS  (STANDING):  aspirin enteric coated 81 milliGRAM(s) Oral daily  atorvastatin 20 milliGRAM(s) Oral at bedtime  enoxaparin Injectable 40 milliGRAM(s) SubCutaneous every 24 hours  escitalopram 5 milliGRAM(s) Oral daily  ferrous    sulfate 325 milliGRAM(s) Oral <User Schedule>  hydrochlorothiazide 12.5 milliGRAM(s) Oral <User Schedule>  melatonin 10 milliGRAM(s) Oral at bedtime  metoprolol succinate ER 25 milliGRAM(s) Oral daily  nystatin Powder 1 Application(s) Topical two times a day  tamsulosin 0.4 milliGRAM(s) Oral at bedtime    MEDICATIONS  (PRN):  acetaminophen     Tablet .. 650 milliGRAM(s) Oral every 6 hours PRN Temp greater or equal to 38C (100.4F), Mild Pain (1 - 3)  aluminum hydroxide/magnesium hydroxide/simethicone Suspension 30 milliLiter(s) Oral every 4 hours PRN Dyspepsia  ondansetron Injectable 4 milliGRAM(s) IV Push every 8 hours PRN Nausea and/or Vomiting    Allergies    No Known Allergies    Intolerances        REVIEW OF SYSTEMS:    CONSTITUTIONAL: No weakness, fevers or chills.   EYES/ENT: No visual changes;    NECK: No pain or stiffness  RESPIRATORY: No cough, wheezing, No shortness of breath  CARDIOVASCULAR: No chest pain or palpitations  GASTROINTESTINAL: No abdominal pain, or hematochezia.  GENITOURINARY: No dysuria orhematuria  NEUROLOGICAL: No numbness or weakness  SKIN: No itching, burning, rashes  All other review of systems is negative unless indicated above    Vital Signs Last 24 Hrs  T(C): 36.4 (08 Jun 2023 04:58), Max: 36.6 (07 Jun 2023 21:21)  T(F): 97.6 (08 Jun 2023 04:58), Max: 97.8 (07 Jun 2023 21:21)  HR: 57 (08 Jun 2023 06:50) (57 - 69)  BP: 112/63 (08 Jun 2023 04:58) (112/63 - 139/57)  BP(mean): --  RR: 18 (08 Jun 2023 04:58) (18 - 18)  SpO2: 92% (08 Jun 2023 04:58) (92% - 95%)    Parameters below as of 08 Jun 2023 04:58  Patient On (Oxygen Delivery Method): room air      I&O's Summary    08 Jun 2023 07:01  -  08 Jun 2023 12:11  --------------------------------------------------------  IN: 240 mL / OUT: 0 mL / NET: 240 mL        PHYSICAL EXAM:  Constitutional: NAD  Neurological: Alert, no focal deficits  HEENT: no JVD, EOMI  Cardiovascular: Regular, S1 and S2, no murmur  Pulmonary: breath sounds bilaterally  Gastrointestinal: Bowel Sounds present, soft, nontender  EXT:  no peripheral edema  Skin: No rashes.  Psych:  Mood calm  LABS: All Labs Reviewed:    CT Head No Cont:   ACC: [id number] EXAM:  CT BRAIN   ORDERED BY: TRINO ENRIQUEZ     ACC: [id number] EXAM:  CT LUMBAR SPINE   ORDERED BY: TRINO ENRIQUEZ     PROCEDURE DATE:  06/05/2023        INTERPRETATION:  CLINICAL STATEMENT: Trauma..    TECHNIQUE: CT of the headand lumbar spine were performed without IV   contrast. 3-D/MIP images obtained    COMPARISON: None.    FINDINGS:  Head:  There is moderate diffuse parenchymal volume loss.    There are areas of low attenuation in the periventricular white matter   likely related to mild chronic microvascular ischemic changes.    There is no acute intracranial hemorrhage, parenchymal mass, mass effect   or midline shift. There is no acute territorial infarct. Prominent   ventricles likely related to central atrophy. Partial empty sella noted    The cranium is intact. Mucosal thickening and retention cyst/polyps noted   in the paranasal sinuses.    Lumbar spine:  Diffuse osteopenia limits evaluation. Status post vertebroplasty with   cement material at T12, L1 and L4 with compression deformities.    Moderate compression deformity of L2 vertebral body appears chronic.    Sagittal alignment is intact    Deformity lower coccyx likely chronic    Evaluation of the spinal canal is limited on a CT exam.  Multilevel   degenerative changes noted resulting in multilevel spinal canal stenosis   and neural foraminal narrowing.    IMPRESSION:    No gross acute intracranial hemorrhage    Diffuse osteopenia limits evaluation of the lumbar spine. No gross acute   fracture. If pain persists, follow-up MRI exam recommended (if no   contraindication).    --- End of Report ---    NATI JO MD; Attending Radiologist  This document has been electronically signed. Jun 5 2023  1:43PM (06-05-23 @ 13:29)  Xray Chest 1 View- PORTABLE-Urgent:   ACC: [id number] EXAM:  XR CHEST PORTABLE URGENT 1V   ORDERED BY: TRINO ENRIQUEZ     PROCEDURE DATE:  06/05/2023    INTERPRETATION:  Clinical History: 89-year-old male, admission.    Portable view of the chest is compared to 6/17/2022 and iscorrelated   with a concurrent lumbar CT.    FINDINGS: Normal cardiac silhouette and normal pulmonary vasculature with   no consolidation, effusion, pneumothorax.    No acute osseous findings. 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    Post sternotomy/CABG.    IMPRESSION:    No acute findings, increased interstitial markings at the left base with   no consolidation      BALJEET STRINGER DO; Attending Radiologist  This document has been electronically signed. Jun 5 2023  4:14PM (06-05-23 @ 12:45)  12 Lead ECG:   Ventricular Rate 59 BPMAtrial Rate 59 BPM    P-R Interval 162 msQRS Duration 148 ms  Q-T Interval 484 ms  QTC Calculation(Bazett) 479 ms  P Axis 53 degrees  R Axis -18 degrees  T Axis -7 degrees  Diagnosis Line Sinus bradycardia  Right bundle branch block  Abnormal ECG  When compared with ECG of 16-JUN-2022 23:26,  Left anterior fascicular block is no longer present  Confirmed by DUNIA HORTON ([id number]) on 6/5/2023 4:56:34 PM (06-05-23 @ 12:12)    89 year old male with PMHx CAD s/p CABG x4, HTN, HLD, BPH, hx left hip fracture presenting with one week of worsening L hip pain radiating to the back and R hip with increasingly difficulty with ambulation. Admitted for KASHIF placement.    ·  Problem: Left hip pain.   ·  Plan: Hx of L hip fracture pw left hip pain and difficulty walking, no recent trauma w/ pain in left hip when ranging but able to fully range, no deformity  - XR femur: No acute radiographic osseous pathology.  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 Bonilla, NOA outpatient w/ Dr. Bonilla  - Pain management as current   - PT: KASHIF.    pt denies CP/SOB/Syncope  12 lead ekg; NSR old RBBB  cardiac catheterization July 20, 2021 demonstrating patent LIMA to the LAD, SVG to RCA, SVG to diagonal and a closed SVG to OM.  EF 45%. No stenting was performed  cont asa/statin/BB//HCTZ  pt sees me outpt [phone]  will follow here prn        
Patient is a 89y old  Male who presents with a chief complaint of Hip pain, difficulty with ambulation (2023 19:29)      INTERVAL HPI/OVERNIGHT EVENTS: Pt seen and examined bedside, c/o Lt hip pain, though better. PT adv for KASHIF.    MEDICATIONS  (STANDING):  aspirin enteric coated 81 milliGRAM(s) Oral daily  atorvastatin 20 milliGRAM(s) Oral at bedtime  enoxaparin Injectable 40 milliGRAM(s) SubCutaneous every 24 hours  escitalopram 5 milliGRAM(s) Oral daily  ferrous    sulfate 325 milliGRAM(s) Oral <User Schedule>  melatonin 10 milliGRAM(s) Oral at bedtime  metoprolol succinate ER 25 milliGRAM(s) Oral daily  nystatin Powder 1 Application(s) Topical two times a day  tamsulosin 0.4 milliGRAM(s) Oral at bedtime    MEDICATIONS  (PRN):  acetaminophen     Tablet .. 650 milliGRAM(s) Oral every 6 hours PRN Temp greater or equal to 38C (100.4F), Mild Pain (1 - 3)  aluminum hydroxide/magnesium hydroxide/simethicone Suspension 30 milliLiter(s) Oral every 4 hours PRN Dyspepsia  ondansetron Injectable 4 milliGRAM(s) IV Push every 8 hours PRN Nausea and/or Vomiting      Allergies    No Known Allergies    Intolerances        REVIEW OF SYSTEMS:  CONSTITUTIONAL: No fever or chills  HEENT:  No headache, no sore throat  RESPIRATORY: No cough, wheezing, or shortness of breath  CARDIOVASCULAR: No chest pain, palpitations, or leg swelling  GASTROINTESTINAL: No abd pain, nausea, vomiting, or diarrhea  GENITOURINARY: No dysuria, frequency, or hematuria  NEUROLOGICAL: no focal weakness or dizziness  MUSCULOSKELETAL: Lt hip pain      Vital Signs Last 24 Hrs  T(C): 36.7 (2023 05:10), Max: 36.8 (2023 15:51)  T(F): 98 (2023 05:10), Max: 98.2 (2023 15:51)  HR: 71 (2023 05:10) (67 - 86)  BP: 151/68 (2023 05:10) (135/70 - 186/95)  BP(mean): --  RR: 18 (2023 05:10) (18 - 19)  SpO2: 92% (2023 05:10) (92% - 98%)    Parameters below as of 2023 05:10  Patient On (Oxygen Delivery Method): room air        PHYSICAL EXAM:  GENERAL: NAD  HEENT:  EOMI, moist mucous membranes  CHEST/LUNG:  CTA b/l, no rales, wheezes, or rhonchi  HEART:  RRR, S1, S2  ABDOMEN:  BS+, soft, nontender, nondistended  EXTREMITIES: no edema, cyanosis, or calf tenderness. Lt hip TTP  NERVOUS SYSTEM: AA&Ox3    LABS:                        13.4   8.62  )-----------( 134      ( 2023 06:18 )             39.7     CBC Full  -  ( 2023 06:18 )  WBC Count : 8.62 K/uL  Hemoglobin : 13.4 g/dL  Hematocrit : 39.7 %  Platelet Count - Automated : 134 K/uL  Mean Cell Volume : 102.6 fl  Mean Cell Hemoglobin : 34.6 pg  Mean Cell Hemoglobin Concentration : 33.8 gm/dL  Auto Neutrophil # : x  Auto Lymphocyte # : x  Auto Monocyte # : x  Auto Eosinophil # : x  Auto Basophil # : x  Auto Neutrophil % : x  Auto Lymphocyte % : x  Auto Monocyte % : x  Auto Eosinophil % : x  Auto Basophil % : x    2023 06:18    139    |  105    |  12     ----------------------------<  92     3.5     |  30     |  0.73     Ca    8.6        2023 06:18    TPro  6.9    /  Alb  3.4    /  TBili  1.1    /  DBili  x      /  AST  15     /  ALT  21     /  AlkPhos  81     2023 11:50      Urinalysis Basic - ( 2023 12:40 )    Color: Yellow / Appearance: Clear / S.013 / pH: x  Gluc: x / Ketone: Negative mg/dL  / Bili: Negative / Urobili: 0.2 mg/dL   Blood: x / Protein: Negative mg/dL / Nitrite: Negative   Leuk Esterase: Trace / RBC: 0 /HPF / WBC 0 /HPF   Sq Epi: x / Non Sq Epi: x / Bacteria: x      CAPILLARY BLOOD GLUCOSE              RADIOLOGY & ADDITIONAL TESTS:    Personally reviewed.     Consultant(s) Notes Reviewed:  [x] YES  [ ] NO    Care Discussed with [x] Consultants  [x] Patient  [ ] Family  [ ]      [ ] Other; RN  DVT ppx

## 2023-06-08 NOTE — DISCHARGE NOTE PROVIDER - HOSPITAL COURSE
ADMISSION DATE:  06-05-23    FROM ADMISSION H+P:   HPI:  89M w/ PMHx CAD s/p CABG x4, HTN, HLD, BPH, left hip fracture presenting with one week of worsening L hip pain radiating to the back and R hip with increasingly difficulty with ambulation. Denies recent trauma, fever/chills. Pt states currently pain is reduced to 4/10 pain after tylenol in the ED. However, occasional he experiences a 8/10 shooting pain that radiates to his back and R hip.    IN THE ED:  Temp 98F, HR 67, /80, RR 19, 98 SpO2 on room air  Labs significant for:   Imaging  CXR: No acute findings, increased interstitial markings at the left base with no consolidation  XR femur: No acute radiographic osseous pathology.  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  EKG: Sinus bradycardia 59, RBBB  Received in ED: s/p tylenol PO, HCTZ 12.5mg  Consults: Ortho, PT   (05 Jun 2023 19:29)      ---  HOSPITAL COURSE/PERTINENT LABS/PROCEDURES PERFORMED/PENDING TESTS:   Pt was admitted for left hip pain, < from: CT Pelvis Bony Only No Cont (06.05.23 @ 13:29) >  1.  Status post left hip ORIF. No solid osseous union across the left   femoral neck fracture.  2.  Chronic L4 fracture status post kyphoplasty.  3.  Degenerative changes.  orthopedic consulted: -No acute orthopaedic surgical intervention indicated, Pain control, NSAIDs (If tolerated), rest, ice, FU outpatient w/ Dr. Bonilla for continued care and possible revision sx vs conversion to JASMEET , WBAT            Patient is stable for discharge as per primary medical team and consultants.    PT consulted, recommends discharge ______    Patient showed improvement throughout hospitalization. Patient was seen and examined on day of discharge. Patient was medically optimized for discharge with close outpatient follow up.    ---  PATIENT CONDITION:  - stable    --  VITALS:   T(C): 36.4 (06-08-23 @ 04:58), Max: 36.6 (06-07-23 @ 21:21)  HR: 57 (06-08-23 @ 06:50) (57 - 69)  BP: 112/63 (06-08-23 @ 04:58) (112/63 - 139/57)  RR: 18 (06-08-23 @ 04:58) (18 - 18)  SpO2: 92% (06-08-23 @ 04:58) (92% - 95%)    PHYSICAL EXAM ON DAY OF DISCHARGE:  GENERAL: NAD, lying in bed   HEAD:  Normocephalic  EYES:  conjunctiva and sclera clear  ENT: Moist mucous membranes  NECK: Supple  CHEST/LUNG: Clear to auscultation bilaterally; No rales or rhonchi; no wheezing. Unlabored respirations  HEART: Regular rate and rhythm; S1S2+  ABDOMEN: Bowel sounds present; Soft, Nontender, Nondistended.   EXTREMITIES:  + distal Peripheral Pulses;  No cyanosis, or edema  NERVOUS SYSTEM:  Alert & Oriented X3;  No gross focal deficits   MSK: moves all extremities, LLE hip Sx site healing well, ROM and strength improved   SKIN: No rashes   ---  CONSULTANTS:   orthopedic

## 2023-06-08 NOTE — DISCHARGE NOTE PROVIDER - CARE PROVIDER_API CALL
Arturo Bonilla  Orthopaedic Surgery  [address] [place] [address], [address]  [place] [address]  Phone: [phone]  Fax: [phone]  Follow Up Time: 2 weeks    your primary care doctor,   Phone: (   )    -  Fax: (   )    -  Follow Up Time: 1 week

## 2023-06-08 NOTE — PROGRESS NOTE ADULT - REASON FOR ADMISSION
Hip pain, difficulty with ambulation

## 2023-06-08 NOTE — DISCHARGE NOTE PROVIDER - NSDCCPCAREPLAN_GEN_ALL_CORE_FT
PRINCIPAL DISCHARGE DIAGNOSIS  Diagnosis: Hip pain, left  Assessment and Plan of Treatment: CT 1.  Status post left hip ORIF. No solid osseous union across the left   femoral neck fracture.  2.  Chronic L4 fracture status post kyphoplasty.  3.  Degenerative changes.  you were seen by elizabeth, no acute intervention suggested, PT/OT, pain management, follow up ortho as out patient

## 2023-06-08 NOTE — DISCHARGE NOTE PROVIDER - NSDCMRMEDTOKEN_GEN_ALL_CORE_FT
Aspir 81 oral delayed release tablet: 1 tab(s) orally once a day.  resume after lovenox course is complete  atorvastatin 20 mg oral tablet: 1 tab(s) orally once a day  escitalopram 5 mg oral tablet: 1 tab(s) orally once a day  hydroCHLOROthiazide 12.5 mg oral tablet: 1 orally once a day Monday wednesday friday  Iron 65mg:   melatonin 10 mg oral tablet: 1 orally  Metoprolol Succinate ER 25 mg oral tablet, extended release: 1 tab(s) orally once a day  tamsulosin 0.4 mg oral capsule: 1 cap(s) orally once a day   acetaminophen 325 mg oral tablet: 2 tab(s) orally every 6 hours As needed Temp greater or equal to 38C (100.4F), Mild Pain (1 - 3)  aluminum hydroxide-magnesium hydroxide 200 mg-200 mg/5 mL oral suspension: 30 milliliter(s) orally every 4 hours As needed Dyspepsia  Aspir 81 oral delayed release tablet: 1 tab(s) orally once a day.  resume after lovenox course is complete  atorvastatin 20 mg oral tablet: 1 tab(s) orally once a day  escitalopram 5 mg oral tablet: 1 tab(s) orally once a day  hydroCHLOROthiazide 12.5 mg oral tablet: 1 orally once a day Monday wednesday friday  Iron 65mg:   Metoprolol Succinate ER 25 mg oral tablet, extended release: 1 tab(s) orally once a day  tamsulosin 0.4 mg oral capsule: 1 cap(s) orally once a day

## 2023-06-08 NOTE — DISCHARGE NOTE NURSING/CASE MANAGEMENT/SOCIAL WORK - NSDCPEFALRISK_GEN_ALL_CORE
For information on Fall & Injury Prevention, visit: [email or web address] OR  [email or web address] OR  https://www.cdc.gov/steadi/patient.html

## 2023-06-08 NOTE — SOCIAL WORK PROGRESS NOTE - NSSWPROGRESSNOTE_GEN_ALL_CORE
pt for dc today to [facility]  for 4pm. [facility] to transport patient. All paperwork to accompany pt. Pt and family aware and in agreement. No further sw services indicated at this time.   plan:  [facility] today   at 4 via [facility].

## 2023-06-08 NOTE — DISCHARGE NOTE PROVIDER - PROVIDER TOKENS
PROVIDER:[TOKEN:[[id number]],FOLLOWUP:[2 weeks]],FREE:[LAST:[your primary care doctor],PHONE:[(   )    -],FAX:[(   )    -],FOLLOWUP:[1 week]]

## 2023-06-08 NOTE — DISCHARGE NOTE NURSING/CASE MANAGEMENT/SOCIAL WORK - PATIENT PORTAL LINK FT
You can access the FollowMyHealth Patient Portal offered by [facility] by registering at the following website: [email or web address]. By joining [employer]’s FollowMyHealth portal, you will also be able to view your health information using other applications (apps) compatible with our system.

## 2024-01-01 ENCOUNTER — EMERGENCY (EMERGENCY)
Facility: HOSPITAL | Age: 89
LOS: 1 days | End: 2024-01-01
Attending: STUDENT IN AN ORGANIZED HEALTH CARE EDUCATION/TRAINING PROGRAM | Admitting: STUDENT IN AN ORGANIZED HEALTH CARE EDUCATION/TRAINING PROGRAM
Payer: MEDICARE

## 2024-01-01 VITALS
OXYGEN SATURATION: 100 % | HEIGHT: 68 IN | DIASTOLIC BLOOD PRESSURE: 26 MMHG | TEMPERATURE: 100 F | SYSTOLIC BLOOD PRESSURE: 42 MMHG | WEIGHT: 176.37 LBS | HEART RATE: 110 BPM

## 2024-01-01 DIAGNOSIS — Z95.1 PRESENCE OF AORTOCORONARY BYPASS GRAFT: Chronic | ICD-10-CM

## 2024-01-01 DIAGNOSIS — Z98.890 OTHER SPECIFIED POSTPROCEDURAL STATES: Chronic | ICD-10-CM

## 2024-01-01 DIAGNOSIS — Z90.49 ACQUIRED ABSENCE OF OTHER SPECIFIED PARTS OF DIGESTIVE TRACT: Chronic | ICD-10-CM

## 2024-01-01 PROCEDURE — 87040 BLOOD CULTURE FOR BACTERIA: CPT

## 2024-01-01 PROCEDURE — 71045 X-RAY EXAM CHEST 1 VIEW: CPT

## 2024-01-01 PROCEDURE — 93005 ELECTROCARDIOGRAM TRACING: CPT

## 2024-01-01 PROCEDURE — 71045 X-RAY EXAM CHEST 1 VIEW: CPT | Mod: 26

## 2024-01-01 PROCEDURE — 99285 EMERGENCY DEPT VISIT HI MDM: CPT

## 2024-01-01 PROCEDURE — 99291 CRITICAL CARE FIRST HOUR: CPT | Mod: 25

## 2024-01-01 PROCEDURE — 87077 CULTURE AEROBIC IDENTIFY: CPT

## 2024-01-01 PROCEDURE — 87150 DNA/RNA AMPLIFIED PROBE: CPT

## 2024-01-01 PROCEDURE — 87186 SC STD MICRODIL/AGAR DIL: CPT

## 2024-01-01 PROCEDURE — 93010 ELECTROCARDIOGRAM REPORT: CPT

## 2024-01-01 PROCEDURE — 82962 GLUCOSE BLOOD TEST: CPT

## 2024-01-01 RX ADMIN — Medication 1000 MILLILITER(S): at 20:21

## 2024-07-31 ENCOUNTER — INPATIENT (INPATIENT)
Facility: HOSPITAL | Age: 89
LOS: 4 days | Discharge: NURSING, REHAB OR LONG-TERM CARE | DRG: 698 | End: 2024-08-05
Attending: INTERNAL MEDICINE | Admitting: INTERNAL MEDICINE
Payer: MEDICARE

## 2024-07-31 VITALS
OXYGEN SATURATION: 96 % | SYSTOLIC BLOOD PRESSURE: 136 MMHG | RESPIRATION RATE: 18 BRPM | DIASTOLIC BLOOD PRESSURE: 77 MMHG | HEIGHT: 68 IN | WEIGHT: 190.04 LBS | TEMPERATURE: 99 F | HEART RATE: 112 BPM

## 2024-07-31 DIAGNOSIS — Z95.1 PRESENCE OF AORTOCORONARY BYPASS GRAFT: Chronic | ICD-10-CM

## 2024-07-31 DIAGNOSIS — Z90.49 ACQUIRED ABSENCE OF OTHER SPECIFIED PARTS OF DIGESTIVE TRACT: Chronic | ICD-10-CM

## 2024-07-31 DIAGNOSIS — N39.0 URINARY TRACT INFECTION, SITE NOT SPECIFIED: ICD-10-CM

## 2024-07-31 LAB
ALBUMIN SERPL ELPH-MCNC: 3.1 G/DL — LOW (ref 3.3–5)
ALP SERPL-CCNC: 76 U/L — SIGNIFICANT CHANGE UP (ref 40–120)
ALT FLD-CCNC: 30 U/L — SIGNIFICANT CHANGE UP (ref 12–78)
ANION GAP SERPL CALC-SCNC: 7 MMOL/L — SIGNIFICANT CHANGE UP (ref 5–17)
APPEARANCE UR: ABNORMAL
AST SERPL-CCNC: 21 U/L — SIGNIFICANT CHANGE UP (ref 15–37)
BASOPHILS # BLD AUTO: 0 K/UL — SIGNIFICANT CHANGE UP (ref 0–0.2)
BASOPHILS NFR BLD AUTO: 0 % — SIGNIFICANT CHANGE UP (ref 0–2)
BILIRUB SERPL-MCNC: 0.8 MG/DL — SIGNIFICANT CHANGE UP (ref 0.2–1.2)
BILIRUB UR-MCNC: NEGATIVE — SIGNIFICANT CHANGE UP
BUN SERPL-MCNC: 36 MG/DL — HIGH (ref 7–23)
CALCIUM SERPL-MCNC: 9.7 MG/DL — SIGNIFICANT CHANGE UP (ref 8.5–10.1)
CHLORIDE SERPL-SCNC: 111 MMOL/L — HIGH (ref 96–108)
CO2 SERPL-SCNC: 26 MMOL/L — SIGNIFICANT CHANGE UP (ref 22–31)
COLOR SPEC: YELLOW — SIGNIFICANT CHANGE UP
CREAT SERPL-MCNC: 1.3 MG/DL — SIGNIFICANT CHANGE UP (ref 0.5–1.3)
DIFF PNL FLD: ABNORMAL
EGFR: 52 ML/MIN/1.73M2 — LOW
EOSINOPHIL # BLD AUTO: 0 K/UL — SIGNIFICANT CHANGE UP (ref 0–0.5)
EOSINOPHIL NFR BLD AUTO: 0 % — SIGNIFICANT CHANGE UP (ref 0–6)
GLUCOSE SERPL-MCNC: 189 MG/DL — HIGH (ref 70–99)
GLUCOSE UR QL: NEGATIVE MG/DL — SIGNIFICANT CHANGE UP
HCT VFR BLD CALC: 36.4 % — LOW (ref 39–50)
HGB BLD-MCNC: 12.2 G/DL — LOW (ref 13–17)
KETONES UR-MCNC: NEGATIVE MG/DL — SIGNIFICANT CHANGE UP
LACTATE SERPL-SCNC: 2.8 MMOL/L — HIGH (ref 0.7–2)
LEUKOCYTE ESTERASE UR-ACNC: ABNORMAL
LIDOCAIN IGE QN: 14 U/L — SIGNIFICANT CHANGE UP (ref 13–75)
LYMPHOCYTES # BLD AUTO: 1.38 K/UL — SIGNIFICANT CHANGE UP (ref 1–3.3)
LYMPHOCYTES # BLD AUTO: 6 % — LOW (ref 13–44)
MCHC RBC-ENTMCNC: 33.5 GM/DL — SIGNIFICANT CHANGE UP (ref 32–36)
MCHC RBC-ENTMCNC: 33.9 PG — SIGNIFICANT CHANGE UP (ref 27–34)
MCV RBC AUTO: 101.1 FL — HIGH (ref 80–100)
MONOCYTES # BLD AUTO: 2.07 K/UL — HIGH (ref 0–0.9)
MONOCYTES NFR BLD AUTO: 9 % — SIGNIFICANT CHANGE UP (ref 2–14)
NEUTROPHILS # BLD AUTO: 19.55 K/UL — HIGH (ref 1.8–7.4)
NEUTROPHILS NFR BLD AUTO: 85 % — HIGH (ref 43–77)
NITRITE UR-MCNC: POSITIVE
NRBC # BLD: SIGNIFICANT CHANGE UP /100 WBCS (ref 0–0)
PH UR: 8 — SIGNIFICANT CHANGE UP (ref 5–8)
PLATELET # BLD AUTO: 245 K/UL — SIGNIFICANT CHANGE UP (ref 150–400)
POTASSIUM SERPL-MCNC: 4.3 MMOL/L — SIGNIFICANT CHANGE UP (ref 3.5–5.3)
POTASSIUM SERPL-SCNC: 4.3 MMOL/L — SIGNIFICANT CHANGE UP (ref 3.5–5.3)
PROT SERPL-MCNC: 7.2 G/DL — SIGNIFICANT CHANGE UP (ref 6–8.3)
PROT UR-MCNC: 300 MG/DL
RBC # BLD: 3.6 M/UL — LOW (ref 4.2–5.8)
RBC # FLD: 16.4 % — HIGH (ref 10.3–14.5)
SODIUM SERPL-SCNC: 144 MMOL/L — SIGNIFICANT CHANGE UP (ref 135–145)
SP GR SPEC: 1.01 — SIGNIFICANT CHANGE UP (ref 1–1.03)
UROBILINOGEN FLD QL: 0.2 MG/DL — SIGNIFICANT CHANGE UP (ref 0.2–1)
WBC # BLD: 23 K/UL — HIGH (ref 3.8–10.5)
WBC # FLD AUTO: 23 K/UL — HIGH (ref 3.8–10.5)

## 2024-07-31 PROCEDURE — 74177 CT ABD & PELVIS W/CONTRAST: CPT | Mod: 26,MC

## 2024-07-31 PROCEDURE — 99285 EMERGENCY DEPT VISIT HI MDM: CPT | Mod: FS

## 2024-07-31 RX ORDER — ONDANSETRON HCL/PF 4 MG/2 ML
4 VIAL (ML) INJECTION ONCE
Refills: 0 | Status: COMPLETED | OUTPATIENT
Start: 2024-07-31 | End: 2024-07-31

## 2024-07-31 RX ORDER — BACTERIOSTATIC SODIUM CHLORIDE 0.9 %
1000 VIAL (ML) INJECTION ONCE
Refills: 0 | Status: COMPLETED | OUTPATIENT
Start: 2024-07-31 | End: 2024-07-31

## 2024-07-31 RX ORDER — BACTERIOSTATIC SODIUM CHLORIDE 0.9 %
500 VIAL (ML) INJECTION ONCE
Refills: 0 | Status: COMPLETED | OUTPATIENT
Start: 2024-07-31 | End: 2024-07-31

## 2024-07-31 RX ADMIN — Medication 100 MILLIGRAM(S): at 21:45

## 2024-07-31 RX ADMIN — Medication 500 MILLILITER(S): at 21:08

## 2024-07-31 RX ADMIN — Medication 500 MILLILITER(S): at 22:08

## 2024-07-31 RX ADMIN — Medication 1000 MILLILITER(S): at 23:40

## 2024-07-31 RX ADMIN — Medication 1000 MILLILITER(S): at 22:40

## 2024-07-31 RX ADMIN — Medication 4 MILLIGRAM(S): at 21:08

## 2024-07-31 NOTE — ED PROVIDER NOTE - ATTENDING APP SHARED VISIT CONTRIBUTION OF CARE
Irma ATTG See MDM I performed a history and physical exam of the patient and discussed their management with the Physician assistant reviewed the PAs note and agree with the documented findings and plan of care. My medical decision making and observations are found above.

## 2024-07-31 NOTE — ED ADULT NURSE NOTE - SKIN INTEGRITY
You can access the FollowMyHealth Patient Portal offered by [facility] by registering at the following website: [email or web address]. By joining [employer]’s FollowMyHealth portal, you will also be able to view your health information using other applications (apps) compatible with our system. You can access the FollowMyHealth Patient Portal offered by [facility] by registering at the following website: [email or web address]. By joining [employer]’s FollowMyHealth portal, you will also be able to view your health information using other applications (apps) compatible with our system. intact

## 2024-07-31 NOTE — ED ADULT NURSE NOTE - NSFALLHARMRISKINTERV_ED_ALL_ED
negative... Assistance OOB with selected safe patient handling equipment if applicable/Assistance with ambulation/Communicate risk of Fall with Harm to all staff, patient, and family/Monitor gait and stability/Provide patient with walking aids/Provide visual cue: red socks, yellow wristband, yellow gown, etc/Reinforce activity limits and safety measures with patient and family/Bed in lowest position, wheels locked, appropriate side rails in place/Call bell, personal items and telephone in reach/Instruct patient to call for assistance before getting out of bed/chair/stretcher/Non-slip footwear applied when patient is off stretcher/[place] to call system/Physically safe environment - no spills, clutter or unnecessary equipment/Purposeful Proactive Rounding/Room/bathroom lighting operational, light cord in reach

## 2024-07-31 NOTE — ED PROVIDER NOTE - OBJECTIVE STATEMENT
89-year-old male brought in by ambulance from [facility] for urine leaking around Wright as well as nausea and vomiting.  Patient states he started not feeling well yesterday, patient nauseous, several episodes of vomiting and diarrhea.  Associated with abdominal pain.  Denies fever, chills, chest pain, shortness of breath, cough, upper or lower extremity weakness or paresthesias.  Patient with chronic indwelling Wright.

## 2024-07-31 NOTE — ED PROVIDER NOTE - INTERNATIONAL TRAVEL
Pt asking to use the phone to get a ride back to his apartment, states that he will not return to that facility he came from and that he has an apartment and that if he is discharged that is where is going back to, charge nurse aware   No

## 2024-07-31 NOTE — ED ADULT NURSE NOTE - OBJECTIVE STATEMENT
PT BIBEMS from [facility] for Wright catheter blockage and leak, as per  the pt. he vomited 3 times but denies fever, diarrhea and SOB, alert and oriented at this time, attached to monitor, safety maintained applied.

## 2024-07-31 NOTE — ED PROVIDER NOTE - SKIN, MLM
Skin intact. No evidence of rash. Melolabial Transposition Flap Text: The defect edges were debeveled with a #15 scalpel blade.  Given the location of the defect and the proximity to free margins a melolabial flap was deemed most appropriate.  Using a sterile surgical marker, an appropriate melolabial transposition flap was drawn incorporating the defect.    The area thus outlined was incised deep to adipose tissue with a #15 scalpel blade.  The skin margins were undermined to an appropriate distance in all directions utilizing iris scissors.

## 2024-07-31 NOTE — ED PROVIDER NOTE - PROGRESS NOTE DETAILS
Patient found to have wbc of 23 and lac 2.8, fluids and abx added at this time. Farris was attempted to flush by RN, unable to flush. Farris changed, gross pyuria in farris

## 2024-07-31 NOTE — ED PROVIDER NOTE - DIFFERENTIAL DIAGNOSIS
ddx includes but not limited to: farris malfunction, UTI, sepsis, bacteremia, intraabdominal pathology Differential Diagnosis

## 2024-07-31 NOTE — ED PROVIDER NOTE - CLINICAL SUMMARY MEDICAL DECISION MAKING FREE TEXT BOX
Irma ATTG    89-year-old male brought in by ambulance from [facility] for urine leaking around Wright as well as nausea and vomiting.  Patient states he started not feeling well yesterday, patient nauseous, several episodes of vomiting and diarrhea.  Associated with abdominal pain.  Denies fever, chills, chest pain, shortness of breath, cough, upper or lower extremity weakness or paresthesias.  Patient with chronic indwelling Wright.  Shayyanthony ATTG See MDM I performed a history and physical exam of the patient and discussed their management with the Physician assistant reviewed the PAs note and agree with the documented findings and plan of care. My medical decision making and observations are found above.

## 2024-07-31 NOTE — ED ADULT NURSE NOTE - PAIN: PRESENCE, MLM
Writer attempted  BH assessment, he stated \"that's alright\". Patient declined assessment, stating \"I want to sleep.\" Writer did assess for safety, he reported chronic suicidal thoughts with no plan.     Writer attempted to discuss history of noncompliance and frequent ED visits, patient interrupted stating \"okay\".       Behavioral Health discharge instructions and resources have been added to the After Visit Summary.        denies pain/discomfort (Rating = 0)

## 2024-08-01 DIAGNOSIS — T83.011A BREAKDOWN (MECHANICAL) OF INDWELLING URETHRAL CATHETER, INITIAL ENCOUNTER: ICD-10-CM

## 2024-08-01 DIAGNOSIS — Z98.890 OTHER SPECIFIED POSTPROCEDURAL STATES: Chronic | ICD-10-CM

## 2024-08-01 DIAGNOSIS — Z29.9 ENCOUNTER FOR PROPHYLACTIC MEASURES, UNSPECIFIED: ICD-10-CM

## 2024-08-01 DIAGNOSIS — N39.0 URINARY TRACT INFECTION, SITE NOT SPECIFIED: ICD-10-CM

## 2024-08-01 DIAGNOSIS — Z90.49 ACQUIRED ABSENCE OF OTHER SPECIFIED PARTS OF DIGESTIVE TRACT: Chronic | ICD-10-CM

## 2024-08-01 DIAGNOSIS — R78.81 BACTEREMIA: ICD-10-CM

## 2024-08-01 DIAGNOSIS — D72.829 ELEVATED WHITE BLOOD CELL COUNT, UNSPECIFIED: ICD-10-CM

## 2024-08-01 DIAGNOSIS — E53.8 DEFICIENCY OF OTHER SPECIFIED B GROUP VITAMINS: ICD-10-CM

## 2024-08-01 DIAGNOSIS — I25.10 ATHEROSCLEROTIC HEART DISEASE OF NATIVE CORONARY ARTERY WITHOUT ANGINA PECTORIS: ICD-10-CM

## 2024-08-01 DIAGNOSIS — Z87.09 PERSONAL HISTORY OF OTHER DISEASES OF THE RESPIRATORY SYSTEM: ICD-10-CM

## 2024-08-01 DIAGNOSIS — R93.3 ABNORMAL FINDINGS ON DIAGNOSTIC IMAGING OF OTHER PARTS OF DIGESTIVE TRACT: ICD-10-CM

## 2024-08-01 DIAGNOSIS — R79.89 OTHER SPECIFIED ABNORMAL FINDINGS OF BLOOD CHEMISTRY: ICD-10-CM

## 2024-08-01 LAB
A1C WITH ESTIMATED AVERAGE GLUCOSE RESULT: 5.7 % — HIGH (ref 4–5.6)
ALBUMIN SERPL ELPH-MCNC: 2.6 G/DL — LOW (ref 3.3–5)
ALP SERPL-CCNC: 65 U/L — SIGNIFICANT CHANGE UP (ref 40–120)
ALT FLD-CCNC: 26 U/L — SIGNIFICANT CHANGE UP (ref 12–78)
ANION GAP SERPL CALC-SCNC: 4 MMOL/L — LOW (ref 5–17)
AST SERPL-CCNC: 18 U/L — SIGNIFICANT CHANGE UP (ref 15–37)
BILIRUB DIRECT SERPL-MCNC: 0.2 MG/DL — SIGNIFICANT CHANGE UP (ref 0–0.3)
BILIRUB INDIRECT FLD-MCNC: 0.6 MG/DL — SIGNIFICANT CHANGE UP (ref 0.2–1)
BILIRUB SERPL-MCNC: 0.8 MG/DL — SIGNIFICANT CHANGE UP (ref 0.2–1.2)
BUN SERPL-MCNC: 30 MG/DL — HIGH (ref 7–23)
CALCIUM SERPL-MCNC: 9.2 MG/DL — SIGNIFICANT CHANGE UP (ref 8.5–10.1)
CHLORIDE SERPL-SCNC: 113 MMOL/L — HIGH (ref 96–108)
CO2 SERPL-SCNC: 27 MMOL/L — SIGNIFICANT CHANGE UP (ref 22–31)
CREAT SERPL-MCNC: 0.79 MG/DL — SIGNIFICANT CHANGE UP (ref 0.5–1.3)
EGFR: 84 ML/MIN/1.73M2 — SIGNIFICANT CHANGE UP
ESTIMATED AVERAGE GLUCOSE: 117 MG/DL — HIGH (ref 68–114)
FERRITIN SERPL-MCNC: 844 NG/ML — HIGH (ref 30–400)
FOLATE SERPL-MCNC: 3.6 NG/ML — LOW
GLUCOSE SERPL-MCNC: 114 MG/DL — HIGH (ref 70–99)
GRAM STN FLD: ABNORMAL
HCT VFR BLD CALC: 34.2 % — LOW (ref 39–50)
HGB BLD-MCNC: 11.2 G/DL — LOW (ref 13–17)
IRON SATN MFR SERPL: 26 % — SIGNIFICANT CHANGE UP (ref 16–55)
IRON SATN MFR SERPL: 43 UG/DL — LOW (ref 45–165)
LACTATE SERPL-SCNC: 1.7 MMOL/L — SIGNIFICANT CHANGE UP (ref 0.7–2)
MAGNESIUM SERPL-MCNC: 2.1 MG/DL — SIGNIFICANT CHANGE UP (ref 1.6–2.6)
MCHC RBC-ENTMCNC: 32.7 GM/DL — SIGNIFICANT CHANGE UP (ref 32–36)
MCHC RBC-ENTMCNC: 33.9 PG — SIGNIFICANT CHANGE UP (ref 27–34)
MCV RBC AUTO: 103.6 FL — HIGH (ref 80–100)
METHOD TYPE: SIGNIFICANT CHANGE UP
NRBC # BLD: 0 /100 WBCS — SIGNIFICANT CHANGE UP (ref 0–0)
PLATELET # BLD AUTO: 206 K/UL — SIGNIFICANT CHANGE UP (ref 150–400)
POTASSIUM SERPL-MCNC: 4.5 MMOL/L — SIGNIFICANT CHANGE UP (ref 3.5–5.3)
POTASSIUM SERPL-SCNC: 4.5 MMOL/L — SIGNIFICANT CHANGE UP (ref 3.5–5.3)
PROT SERPL-MCNC: 6.4 G/DL — SIGNIFICANT CHANGE UP (ref 6–8.3)
PROTEUS SP DNA BLD POS QL NAA+NON-PROBE: SIGNIFICANT CHANGE UP
RBC # BLD: 3.3 M/UL — LOW (ref 4.2–5.8)
RBC # FLD: 16.6 % — HIGH (ref 10.3–14.5)
SODIUM SERPL-SCNC: 144 MMOL/L — SIGNIFICANT CHANGE UP (ref 135–145)
SPECIMEN SOURCE: SIGNIFICANT CHANGE UP
TIBC SERPL-MCNC: 166 UG/DL — LOW (ref 220–430)
TSH SERPL-MCNC: 1.72 UIU/ML — SIGNIFICANT CHANGE UP (ref 0.36–3.74)
UIBC SERPL-MCNC: 123 UG/DL — SIGNIFICANT CHANGE UP (ref 110–370)
VIT B12 SERPL-MCNC: 594 PG/ML — SIGNIFICANT CHANGE UP (ref 232–1245)
WBC # BLD: 19.54 K/UL — HIGH (ref 3.8–10.5)
WBC # FLD AUTO: 19.54 K/UL — HIGH (ref 3.8–10.5)

## 2024-08-01 PROCEDURE — 71045 X-RAY EXAM CHEST 1 VIEW: CPT | Mod: 26

## 2024-08-01 PROCEDURE — 93306 TTE W/DOPPLER COMPLETE: CPT | Mod: 26

## 2024-08-01 PROCEDURE — 71250 CT THORAX DX C-: CPT | Mod: 26

## 2024-08-01 PROCEDURE — 99223 1ST HOSP IP/OBS HIGH 75: CPT

## 2024-08-01 PROCEDURE — 93010 ELECTROCARDIOGRAM REPORT: CPT

## 2024-08-01 RX ORDER — ACETAMINOPHEN 500 MG
650 TABLET ORAL EVERY 6 HOURS
Refills: 0 | Status: DISCONTINUED | OUTPATIENT
Start: 2024-08-01 | End: 2024-08-05

## 2024-08-01 RX ORDER — MAGNESIUM, ALUMINUM HYDROXIDE 200-225/5
30 SUSPENSION, ORAL (FINAL DOSE FORM) ORAL EVERY 4 HOURS
Refills: 0 | Status: DISCONTINUED | OUTPATIENT
Start: 2024-08-01 | End: 2024-08-05

## 2024-08-01 RX ORDER — MAGNESIUM HYDROXIDE 400 MG/5ML
30 SUSPENSION, ORAL (FINAL DOSE FORM) ORAL
Refills: 0 | DISCHARGE

## 2024-08-01 RX ORDER — FERROUS SULFATE 325(65) MG
1 TABLET ORAL
Refills: 0 | DISCHARGE

## 2024-08-01 RX ORDER — HEPARIN SODIUM 1000 [USP'U]/ML
5000 INJECTION, SOLUTION INTRAVENOUS; SUBCUTANEOUS EVERY 12 HOURS
Refills: 0 | Status: DISCONTINUED | OUTPATIENT
Start: 2024-08-01 | End: 2024-08-05

## 2024-08-01 RX ORDER — DEXTROSE MONOHYDRATE, SODIUM CHLORIDE, SODIUM LACTATE, CALCIUM CHLORIDE, MAGNESIUM CHLORIDE 1.5; 538; 448; 18.4; 5.08 G/100ML; MG/100ML; MG/100ML; MG/100ML; MG/100ML
1000 SOLUTION INTRAPERITONEAL
Refills: 0 | Status: DISCONTINUED | OUTPATIENT
Start: 2024-08-01 | End: 2024-08-05

## 2024-08-01 RX ORDER — ASPIRIN 325 MG
1 TABLET ORAL
Refills: 0 | DISCHARGE

## 2024-08-01 RX ORDER — PANTOPRAZOLE SODIUM 20 MG/1
40 TABLET, DELAYED RELEASE ORAL
Refills: 0 | Status: DISCONTINUED | OUTPATIENT
Start: 2024-08-01 | End: 2024-08-05

## 2024-08-01 RX ORDER — MULTIVITAMIN WITH MINERALS
1 SYRUP ORAL
Refills: 0 | DISCHARGE

## 2024-08-01 RX ORDER — ESCITALOPRAM OXALATE 20 MG
1 TABLET ORAL
Refills: 0 | DISCHARGE

## 2024-08-01 RX ORDER — FINASTERIDE 5 MG/1
1 TABLET, FILM COATED ORAL
Refills: 0 | DISCHARGE

## 2024-08-01 RX ORDER — ASPIRIN 500 MG
81 TABLET ORAL DAILY
Refills: 0 | Status: DISCONTINUED | OUTPATIENT
Start: 2024-08-01 | End: 2024-08-05

## 2024-08-01 RX ORDER — TAMSULOSIN HCL 0.4 MG
0.4 CAPSULE ORAL AT BEDTIME
Refills: 0 | Status: DISCONTINUED | OUTPATIENT
Start: 2024-08-01 | End: 2024-08-05

## 2024-08-01 RX ORDER — MELATONIN 3 MG
3 TABLET ORAL AT BEDTIME
Refills: 0 | Status: DISCONTINUED | OUTPATIENT
Start: 2024-08-01 | End: 2024-08-05

## 2024-08-01 RX ORDER — CYANOCOBALAMIN 1000 UG/ML
1 INJECTION, SOLUTION INTRAMUSCULAR; SUBCUTANEOUS
Refills: 0 | DISCHARGE

## 2024-08-01 RX ORDER — ONDANSETRON HCL/PF 4 MG/2 ML
4 VIAL (ML) INJECTION EVERY 8 HOURS
Refills: 0 | Status: DISCONTINUED | OUTPATIENT
Start: 2024-08-01 | End: 2024-08-05

## 2024-08-01 RX ORDER — ATORVASTATIN CALCIUM 40 MG/1
10 TABLET, FILM COATED ORAL AT BEDTIME
Refills: 0 | Status: DISCONTINUED | OUTPATIENT
Start: 2024-08-01 | End: 2024-08-05

## 2024-08-01 RX ORDER — MELATONIN 3 MG
1 TABLET ORAL
Refills: 0 | DISCHARGE

## 2024-08-01 RX ORDER — LYSINE HCL 500 MG
2 TABLET ORAL
Refills: 0 | DISCHARGE

## 2024-08-01 RX ORDER — METOPROLOL TARTRATE 100 MG
25 TABLET ORAL DAILY
Refills: 0 | Status: DISCONTINUED | OUTPATIENT
Start: 2024-08-01 | End: 2024-08-05

## 2024-08-01 RX ORDER — ESCITALOPRAM OXALATE 20 MG
5 TABLET ORAL DAILY
Refills: 0 | Status: DISCONTINUED | OUTPATIENT
Start: 2024-08-01 | End: 2024-08-05

## 2024-08-01 RX ORDER — ASPIRIN 500 MG
1 TABLET ORAL
Refills: 0 | DISCHARGE

## 2024-08-01 RX ORDER — SODIUM PHOSPHATE,MONO-DIBASIC
133 SOLUTION, ORAL ORAL
Refills: 0 | DISCHARGE

## 2024-08-01 RX ORDER — ONDANSETRON HCL/PF 4 MG/2 ML
1 VIAL (ML) INJECTION
Refills: 0 | DISCHARGE

## 2024-08-01 RX ADMIN — PANTOPRAZOLE SODIUM 40 MILLIGRAM(S): 20 TABLET, DELAYED RELEASE ORAL at 05:58

## 2024-08-01 RX ADMIN — DEXTROSE MONOHYDRATE, SODIUM CHLORIDE, SODIUM LACTATE, CALCIUM CHLORIDE, MAGNESIUM CHLORIDE 45 MILLILITER(S): 1.5; 538; 448; 18.4; 5.08 SOLUTION INTRAPERITONEAL at 12:56

## 2024-08-01 RX ADMIN — Medication 81 MILLIGRAM(S): at 12:57

## 2024-08-01 RX ADMIN — Medication 5 MILLIGRAM(S): at 12:57

## 2024-08-01 RX ADMIN — DEXTROSE MONOHYDRATE, SODIUM CHLORIDE, SODIUM LACTATE, CALCIUM CHLORIDE, MAGNESIUM CHLORIDE 45 MILLILITER(S): 1.5; 538; 448; 18.4; 5.08 SOLUTION INTRAPERITONEAL at 18:29

## 2024-08-01 RX ADMIN — Medication 0.4 MILLIGRAM(S): at 21:58

## 2024-08-01 RX ADMIN — Medication 25 MILLIGRAM(S): at 05:58

## 2024-08-01 RX ADMIN — HEPARIN SODIUM 5000 UNIT(S): 1000 INJECTION, SOLUTION INTRAVENOUS; SUBCUTANEOUS at 05:59

## 2024-08-01 RX ADMIN — Medication 100 MILLIGRAM(S): at 21:58

## 2024-08-01 RX ADMIN — DEXTROSE MONOHYDRATE, SODIUM CHLORIDE, SODIUM LACTATE, CALCIUM CHLORIDE, MAGNESIUM CHLORIDE 45 MILLILITER(S): 1.5; 538; 448; 18.4; 5.08 SOLUTION INTRAPERITONEAL at 01:06

## 2024-08-01 RX ADMIN — ATORVASTATIN CALCIUM 10 MILLIGRAM(S): 40 TABLET, FILM COATED ORAL at 21:58

## 2024-08-01 RX ADMIN — HEPARIN SODIUM 5000 UNIT(S): 1000 INJECTION, SOLUTION INTRAVENOUS; SUBCUTANEOUS at 18:29

## 2024-08-01 NOTE — H&P ADULT - HISTORY OF PRESENT ILLNESS
89-year-old male brought in by ambulance from [facility] for urine leaking around Wright as well as nausea and vomiting.  Patient states he started not feeling well yesterday, patient nauseous, several episodes of vomiting and diarrhea.  Associated with abdominal pain.  Denies fever, chills, chest pain, shortness of breath, cough, upper or lower extremity weakness or paresthesias.  Patient with chronic indwelling Wright. 89-year-old male brought in by ambulance from [facility] for urine leaking around Wright as well as nausea and vomiting.  Patient states he started not feeling well yesterday, patient nauseous, several episodes of vomiting and diarrhea.  Associated with abdominal pain.  Denies fever, chills, chest pain, shortness of breath, cough, upper or lower extremity weakness or paresthesias.  Patient with chronic indwelling Wright. Found to have UTI and bacteremia Admitted for septic workup and evaluation ,send blood and urine cx ,serial lactate levels ,monitor vitals closely hydration ,monitor urine output and renal profile ,iv abx initiated Palliative care consult requested ,to discuss advance directives and complete MOLST

## 2024-08-01 NOTE — CONSULT NOTE ADULT - ASSESSMENT
uti -= on rocephin  foleys cath   ashd   s/p cabg  hy[pertension  dyslipidemia  depression  
   Initial evaluation/Pulmonary Critical Care consultation requested by DR BENDER  on 8/1/2024 from Dr Easton Luo    Patient examined chart reviewed    HOSPITAL ADMISSION   PATIENT CAME  FROM (if information available)      REASON FOR VISIT  .. Management of problems listed below        REVIEW OF SYMPTOMS   Able to give ROS  Yes     RELIABILITY +/-   CONSTITUTIONAL Weakness Yes    ENDOCRINE  No heat or cold intolerance    ALLERGY No hives  Sore throat No stridor  RESP Shortness of breath YES   NEURO New weakness No   CARDIAC   Palpitations No         PHYSICAL EXAM    HEENT Unremarkable  atraumatic   RESP Fair air entry  Harsh breath sound   CARDIAC S1 S2 No S3     NO JVD    ABDOMEN No hepatosplenomegaly   PEDAL EDEMA present No calf tenderness  NO rash       XXXX[id number]  BEST PRACTICE ISSUES.  . HOB ELEVATN.    .... Yes  . DIET.   .... 8/1/2024 dash   . IV FLUIDS.  .... 8/1/2024 1/2 ns 45  . DVT PPLX.    .... 8/1/2024 hpsc   . STRESS ULCER PPLX.   .... 8/1/2024 PROTONIX 40   . INFECTION PPLX.   ....     [id number]  GENERAL DATA .   GOC.    ..  8/1/2024 full code   ICU STAY.    .. no   COVID.   ..   ALLGY.   ..     nka   WT.   ..  8/1/2024 86  BMI.  .. 8/1/2024 26   ISOLATION.        XXXXXXXXXXXXXXXXXXXXXXX  VITALS/GAS EXCHANGE/DRIPS  ABGS.   .  VS/ PO/IO/ VENT/ DRIPS.   8/1/2024 afeb 55 140/60   8/1/2024 ra 94%       XXXXXXXXXXXXXXXXXXXXXXXXXXXXXXXXX  HOSPITAL COURSE.  . CC.  .... 8/1/2024 Pt sent from [facility] for leaking davis and2 episodes of vomiting.  . HPI.  .... 8/1/2024  89-year-old male brought in by ambulance from [facility] for urine leaking around Davis as well as nausea and vomiting.  Patient states he started not feeling well yesterday, patient nauseous, several episodes of vomiting and diarrhea.  Associated with abdominal pain.  Denies fever, chills, chest pain, shortness of breath, cough, upper or lower extremity weakness or paresthesias.  Patient with chronic indwelling Davis.  . PMH.  PAST MEDICAL HISTORY:  CAD (coronary artery disease)   HLD (hyperlipidemia)   HTN (hypertension).   PAST SURGICAL HISTORY:  History of cholecystectomy   S/P CABG x 4.   . HOME MEDS .  * Patient Currently Takes Medications as of 08-Jun-2023 08:50 documented in Structured Notes  · aluminum hydroxide-magnesium hydroxide 200 mg-200 mg/5 mL oral suspension: 30 milliliter(s) orally every 4 hours As needed Dyspepsia  · acetaminophen 325 mg oral tablet: 2 tab(s) orally every 6 hours As needed Temp greater or equal to 38C (100.4F), Mild Pain (1 - 3)  · Metoprolol Succinate ER 25 mg oral tablet, extended release: 1 tab(s) orally once a day  · Aspir 81 oral delayed release tablet: 1 tab(s) orally once a day.  resume after lovenox course is complete  · hydroCHLOROthiazide 12.5 mg oral tablet: 1 orally once a day Monday wednesday friday  · atorvastatin 20 mg oral tablet: 1 tab(s) orally once a day  · tamsulosin 0.4 mg oral capsule: 1 cap(s) orally once a day  · escitalopram 5 mg oral tablet: 1 tab(s) orally once a day  · Iron 65mg:   . DEVICES/PROCEDURES.          . HPI.  .... 8/1/2024  89 f from  [facility] for urine leaking around Davis as well as nausea and vomiting.   Associated with abdominal pain. Patient with chronic indwelling Davis.  .... PMH CAD CABG Hytn IDF   .... 8/1/2024 CT abd did not show obstrn Pt hadleukocytosis and left basal pleural calcifications and Pulm consulted     XXXXXXXXXXXXXXXXXXXXXXXXXXXXXXXXXXX  PROBLEM ASSESSMENT/RECOMMN/PLAN.  RESP.   . GAS EXCHANGE   .... target po 90-95%    . PLEURAL PLAQUE   .... 8/1/2024 ctap   ....... left basal pleural plaques   .... 8/1/2024 Check ct chest     . COUGH  .... 8/1/2024 robitiussin     INFECTION.  . CAUTI   .... w 8/1/2024 w 19   .... ua 7/31 w 45 r 11 n (+) le (+)   .... ctap ic 8/1/2024 cw 6/5/2023   ........ sternotomy   ........ coronary artery calcification   ....... left basal pleural plaques   ........ urinary bladder collapsed aroun davis   ........ no bowel obstrn   ....... kpp l4 l1 t12   .... 8/1/2024 ROCEPHIN  .... Follow cs     CARDIAC.  . LACTICEMIA   .... la 7/31-7/31 la 2.8-1.7    . CAD  .... 8/1/2024 ATORVASTAT 10  .... 8/1/2024 METOPROLOL 25     . VOMITING  .... 8/1/2024ONDASETRON      HEMAT.  .... Hb 8/1/2024 Hb 11.2   .... plt 8/1/2024 plt 206   .... monitor    GI.  .... LFTS 8/1/2024   ........ AP 65  ........ AST 18  ....... alt 26   .... monitor    . VOMITING  .... 8/1/2024 CTAP no obstrn     RENAL.  .... Na 8/1/2024 na 144   .... K 8/1/2024 K 4.5   .... CO2 8/1/2024 CO2 27   .... Cr 8/1/2024 Cr .7   .... monitor     . BPH  ....8/1/2024 TAMSULOSIN .4     ENDO.  .... tsh 8/1/2024 tsh 1.72     NEURO.  .... 8/1/2024 ESCITALOPRAM 5   .... 8/1/2024 MELATONIN 3 P     XXXXXXXXXXXXXXXXXXXXXX  OVERALL   . PROBLEMS 7/29/2024  PRESENTATION  .  . NAUSEA VOMITING 8/1/2024     . PROBLEMS 7/29/2024  HOSPITALIZATION  .  . CAUTI 8/1/2024  .... 8/1/2024 Rocephin   . CALCIFIED PLEURAL PLAQUE LEFT BASE 8/1/2024 CT   . VOMITING 8/1/2024     . CHRONIC/POST DISCHARGE PROBLEMS  .  . INDWELLING DAVIS POA 7/31/2024   . CAD SP CABG       HOME O2 ELIGIBILITY.     . To be determined at time of discharge   . Will need home oxygen if ra rest or exertion pulse ox droops below 88    TIME SPENT.  . Over 55  minutes aggregate care time spent on encounter; activities included   direct patient care, counseling and/or coordinating care reviewing notes, lab data/ imaging , discussion with multidisciplinary team/ patient  /family and explaining in detail risks, benefits, alternatives  of the recommendations     SENIOR CARISSA ROJAS 89 m 7/31/2024 12/21/1933 DR MULUGETA PA .  .

## 2024-08-01 NOTE — H&P ADULT - PROBLEM SELECTOR PLAN 3
Patient was ruled in for sepsis 2/2 to acute uti 2/2 to chronic indwelling Wright catheter , poa - repeat blood cx , ID cons informed , patient is on ceftriaxone

## 2024-08-01 NOTE — CONSULT NOTE ADULT - SUBJECTIVE AND OBJECTIVE BOX
CARDIOLOGY CONSULT NOTE    Patient is a 89y Male with a known history of : admitted with uti and leaking around foleys cath    HPI:  89-year-old male brought in by ambulance from [facility] for urine leaking around Wright as well as nausea and vomiting.  Patient states he started not feeling well yesterday, patient nauseous, several episodes of vomiting and diarrhea.  Associated with abdominal pain.  Denies fever, chills, chest pain, shortness of breath, cough, upper or lower extremity weakness or paresthesias.  Patient with chronic indwelling Wright. (01 Aug 2024 08:11)      REVIEW OF SYSTEMS:    CONSTITUTIONAL: No fever, weight loss, or fatigue  EYES: No eye pain, visual disturbances, or discharge  ENMT:  No difficulty hearing, tinnitus, vertigo; No sinus or throat pain  NECK: No pain or stiffness  BREASTS: No pain, masses, or nipple discharge  RESPIRATORY: No cough, wheezing, chills or hemoptysis; No shortness of breath  CARDIOVASCULAR: No chest pain, palpitations, dizziness, or leg swelling  GASTROINTESTINAL: No abdominal or epigastric pain. No nausea, vomiting, or hematemesis; No diarrhea or constipation. No melena or hematochezia.  GENITOURINARY: No dysuria, frequency, hematuria, or incontinence  NEUROLOGICAL: No headaches, memory loss, loss of strength, numbness, or tremors  SKIN: No itching, burning, rashes, or lesions   LYMPH NODES: No enlarged glands  ENDOCRINE: No heat or cold intolerance; No hair loss  MUSCULOSKELETAL: No joint pain or swelling; No muscle, back, or extremity pain  PSYCHIATRIC: No depression, anxiety, mood swings, or difficulty sleeping  HEME/LYMPH: No easy bruising, or bleeding gums  ALLERGY AND IMMUNOLOGIC: No hives or eczema    MEDICATIONS  (STANDING):  aspirin enteric coated 81 milliGRAM(s) Oral daily  atorvastatin 10 milliGRAM(s) Oral at bedtime  cefTRIAXone   IVPB 1000 milliGRAM(s) IV Intermittent every 24 hours  escitalopram 5 milliGRAM(s) Oral daily  heparin   Injectable 5000 Unit(s) SubCutaneous every 12 hours  metoprolol succinate ER 25 milliGRAM(s) Oral daily  pantoprazole    Tablet 40 milliGRAM(s) Oral before breakfast  sodium chloride 0.45%. 1000 milliLiter(s) (45 mL/Hr) IV Continuous <Continuous>  tamsulosin 0.4 milliGRAM(s) Oral at bedtime    MEDICATIONS  (PRN):  acetaminophen     Tablet .. 650 milliGRAM(s) Oral every 6 hours PRN Temp greater or equal to 38C (100.4F), Mild Pain (1 - 3)  aluminum hydroxide/magnesium hydroxide/simethicone Suspension 30 milliLiter(s) Oral every 4 hours PRN Dyspepsia  melatonin 3 milliGRAM(s) Oral at bedtime PRN Insomnia  ondansetron Injectable 4 milliGRAM(s) IV Push every 8 hours PRN Nausea and/or Vomiting      ALLERGIES: No Known Allergies      Social History:     FAMILY HISTORY:  No pertinent family history in first degree relatives        PAST MEDICAL & SURGICAL HISTORY:  CAD (coronary artery disease)      HTN (hypertension)      HLD (hyperlipidemia)      S/P CABG x 4      History of cholecystectomy            PHYSICAL EXAMINATION:  -----------------------------  T(C): 36.3 (08-01-24 @ 04:40), Max: 37.6 (07-31-24 @ 21:50)  HR: 55 (08-01-24 @ 04:40) (55 - 112)  BP: 148/65 (08-01-24 @ 04:40) (136/77 - 159/71)  RR: 19 (08-01-24 @ 04:40) (18 - 19)  SpO2: 94% (08-01-24 @ 04:40) (94% - 96%)  Wt(kg): --    07-31 @ 07:01  -  08-01 @ 07:00  --------------------------------------------------------  IN:    sodium chloride 0.45%: 135 mL  Total IN: 135 mL    OUT:    Voided (mL): 600 mL  Total OUT: 600 mL    Total NET: -465 mL        Height (cm): 172.7 (07-31 @ 19:50)  Weight (kg): 86.2 (07-31 @ 19:50)  BMI (kg/m2): 28.9 (07-31 @ 19:50)  BSA (m2): 2 (07-31 @ 19:50)    Constitutional: well developed, normal appearance, well groomed, well nourished, no deformities and no acute distress.   Eyes: the conjunctiva exhibited no abnormalities and the eyelids demonstrated no xanthelasmas.   HEENT: normal oral mucosa, no oral pallor and no oral cyanosis.   Neck: normal jugular venous A waves present, normal jugular venous V waves present and no jugular venous velazquez A waves.   Pulmonary: no respiratory distress, normal respiratory rhythm and effort, no accessory muscle use and lungs were clear to auscultation bilaterally.   Cardiovascular: heart rate and rhythm were normal, normal S1 and S2 and no murmur, gallop, rub, heave or thrill are present.   Abdomen: soft, non-tender, no hepato-splenomegaly and no abdominal mass palpated.   Musculoskeletal: the gait could not be assessed..   Extremities: no clubbing of the fingernails, no localized cyanosis, no petechial hemorrhages and no ischemic changes.   Skin: normal skin color and pigmentation, no rash, no venous stasis, no skin lesions, no skin ulcer and no xanthoma was observed.   Psychiatric: oriented to person, place, and time, the affect was normal, the mood was normal and not feeling anxious.     LABS:   --------  07-31    144  |  111<H>  |  36<H>  ----------------------------<  189<H>  4.3   |  26  |  1.30    Ca    9.7      31 Jul 2024 21:00    TPro  7.2  /  Alb  3.1<L>  /  TBili  0.8  /  DBili  x   /  AST  21  /  ALT  30  /  AlkPhos  76  07-31                         12.2   23.00 )-----------( 245      ( 31 Jul 2024 21:00 )             36.4                 RADIOLOGY:  -----------------        ECG:     ECHO

## 2024-08-01 NOTE — H&P ADULT - NSHPLABSRESULTS_GEN_ALL_CORE
< from: CT Abdomen and Pelvis w/ IV Cont (07.31.24 @ 22:14) >      PROCEDURE:  CT of the Abdomen and Pelvis was performed.  Sagittal andcoronal reformats were performed.    FINDINGS:  LOWER CHEST: Sternotomy. Coronary artery calcifications. Left basilar   calcified pleural plaques.    LIVER: Within normal limits.  BILE DUCTS: Normal caliber.  GALLBLADDER: Cholecystectomy.  SPLEEN: Within normal limits.  PANCREAS: Within normal limits.  ADRENALS: Within normal limits.  KIDNEYS/URETERS: Small bilateral cysts and other hypodensities too small   to characterize.    BLADDER: The urinary bladder is collapsed around a Wright catheter   limiting evaluation.  REPRODUCTIVE ORGANS: Prostate is enlarged with mass effect on the urinary   bladder.    BOWEL: No bowel obstruction. Appendix is not visualized. No evidence of   inflammation in the pericecal region.  PERITONEUM/RETROPERITONEUM: Within normal limits.  VESSELS: Atherosclerotic changes.  LYMPH NODES: No lymphadenopathy.  ABDOMINAL WALL: Within normal limits.  BONES: Degenerative changes. Sternotomy. Bilateral hip ORIF. Diffuse   osteopenia. Multilevel vertebral body height loss.This is most severe at   L4 which contains kyphoplasty cement kyphoplasty cement is also seen at   L1 and T12.    IMPRESSION:    Urinary bladder is collapsed with Wright catheter limiting evaluation.   Correlate with urinalysis.    No finding to explain the patient's nausea vomiting and diarrhea.    < end of copied text > < from: CT Abdomen and Pelvis w/ IV Cont (07.31.24 @ 22:14) >      PROCEDURE:  CT of the Abdomen and Pelvis was performed.  Sagittal andcoronal reformats were performed.    FINDINGS:  LOWER CHEST: Sternotomy. Coronary artery calcifications. Left basilar   calcified pleural plaques.    LIVER: Within normal limits.  BILE DUCTS: Normal caliber.  GALLBLADDER: Cholecystectomy.  SPLEEN: Within normal limits.  PANCREAS: Within normal limits.  ADRENALS: Within normal limits.  KIDNEYS/URETERS: Small bilateral cysts and other hypodensities too small   to characterize.    BLADDER: The urinary bladder is collapsed around a Wright catheter   limiting evaluation.  REPRODUCTIVE ORGANS: Prostate is enlarged with mass effect on the urinary   bladder.    BOWEL: No bowel obstruction. Appendix is not visualized. No evidence of   inflammation in the pericecal region.  PERITONEUM/RETROPERITONEUM: Within normal limits.  VESSELS: Atherosclerotic changes.  LYMPH NODES: No lymphadenopathy.  ABDOMINAL WALL: Within normal limits.  BONES: Degenerative changes. Sternotomy. Bilateral hip ORIF. Diffuse   osteopenia. Multilevel vertebral body height loss.This is most severe at   L4 which contains kyphoplasty cement kyphoplasty cement is also seen at   L1 and T12.    IMPRESSION:    Urinary bladder is collapsed with Wright catheter limiting evaluation.   Correlate with urinalysis.    No finding to explain the patient's nausea vomiting and diarrhea.    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 Please   correlate for signs and symptoms.  No focal infiltrate appreciated    < end of copied text >

## 2024-08-01 NOTE — CARE COORDINATION ASSESSMENT. - NSCAREPROVIDERS_GEN_ALL_CORE_FT
CARE PROVIDERS:  Accepting Physician: Trinidad Nixon  Administration: Torsten Diaz  Administration: Ishmael Howell  Admitting: Trinidad Nixon  Attending: Trinidad Nixon  Cardiology Technician: Desiree Vallejo  Consultant: Easton Luo  Consultant: Carissa Qureshi  Consultant: Alexys Steven ED ACP: Landy Gill ED Attending: Bandar Solis ED Nurse: Todd Cooper  Nurse: Delilah Ortiz  Nurse: Opal Amezcua  Nurse: Danielle Avery  Nurse: Ambrose Roe  Nurse: Loretta Coates  Nurse: Aileen Du  Outpatient Provider: Avery Puente  Outpatient Provider: Pahlavan, Mohsen  Outpatient Provider: Chi Petersen  Outpatient Provider: Haseeb De La Rosa  Override: Opal Amezcua  PCA/Nursing Assistant: Trini Frazier  PCA/Nursing Assistant: Ilia Ramirez  : Ida Medellin  Speech Pathology: Richard Vidal

## 2024-08-01 NOTE — H&P ADULT - ASSESSMENT
89-year-old male brought in by ambulance from [facility] for urine leaking around Wright as well as nausea and vomiting.  Patient states he started not feeling well yesterday, patient nauseous, several episodes of vomiting and diarrhea.  Associated with abdominal pain.  Denies fever, chills, chest pain, shortness of breath, cough, upper or lower extremity weakness or paresthesias.  Patient with chronic indwelling Wright. Found to have UTI and bacteremia Admitted for septic workup and evaluation ,send blood and urine cx ,serial lactate levels ,monitor vitals closely hydration ,monitor urine output and renal profile ,iv abx initiated Palliative care consult requested ,to discuss advance directives and complete MOLST

## 2024-08-01 NOTE — CARE COORDINATION ASSESSMENT. - OTHER PERTINENT REFERRAL INFORMATION
89 y/o male admitted from [facility] with chronic farris, r/o PNA,  plan is return to [facility] when medically cleared. Pt has support from family who are involved with care.

## 2024-08-01 NOTE — H&P ADULT - TIME BILLING
75minutes spent on this visit, 50% visit time spent in care co-ordination with other attendings and counselling patient ,writing admission orders ( see complete and current orders and order section) ,requesting necessary consults ,informing family about status & plan of care .I have discussed care plan with [facility] wellness/admitting /nursing   department ,outpatient PCP , hospital consultants , ER physician & med staff .

## 2024-08-01 NOTE — H&P ADULT - PROBLEM SELECTOR PLAN 2
Repeat blood cx , ID cons informed , patient is on ceftriaxone ,follow cx to completion .Patient was ruled in for sepsis 2/2 to acute uti 2/2 to chronic indwelling Wright catheter , poa - repeat blood cx , ID cons informed , patient is on ceftriaxone

## 2024-08-01 NOTE — PROGRESS NOTE ADULT - SUBJECTIVE AND OBJECTIVE BOX
Patient is a 89y Male whom presented to the hospital with proteinurea     PAST MEDICAL & SURGICAL HISTORY:  CAD (coronary artery disease)      HTN (hypertension)      HLD (hyperlipidemia)      S/P CABG x 4      History of cholecystectomy          MEDICATIONS  (STANDING):  aspirin enteric coated 81 milliGRAM(s) Oral daily  atorvastatin 10 milliGRAM(s) Oral at bedtime  cefTRIAXone   IVPB 1000 milliGRAM(s) IV Intermittent every 24 hours  escitalopram 5 milliGRAM(s) Oral daily  heparin   Injectable 5000 Unit(s) SubCutaneous every 12 hours  metoprolol succinate ER 25 milliGRAM(s) Oral daily  pantoprazole    Tablet 40 milliGRAM(s) Oral before breakfast  sodium chloride 0.45%. 1000 milliLiter(s) (45 mL/Hr) IV Continuous <Continuous>  tamsulosin 0.4 milliGRAM(s) Oral at bedtime      Allergies    No Known Allergies    Intolerances        SOCIAL HISTORY:  Denies ETOh,Smoking,     FAMILY HISTORY:  No pertinent family history in first degree relatives        REVIEW OF SYSTEMS:    CONSTITUTIONAL: No weakness, fevers or chills  RESPIRATORY: No cough, wheezing, hemoptysis; No shortness of breath  CARDIOVASCULAR: No chest pain or palpitations  GENITOURINARY: No dysuria, frequency or hematuria  NEUROLOGICAL: No numbness or weakness  SKIN: dry   Gastrointestinal [+]: ABDOMINAL PAIN, DIARRHEA, NAUSEA, VOMITING   Genitourinary [+]: + farris leaking      VITAL:  T(C): , Max: 37.6 (07-31-24 @ 21:50)  T(F): , Max: 99.6 (07-31-24 @ 21:50)  HR: 88 (08-01-24 @ 11:26)  BP: 124/64 (08-01-24 @ 11:26)  BP(mean): 102 (08-01-24 @ 02:45)  RR: 18 (08-01-24 @ 11:26)  SpO2: 92% (08-01-24 @ 11:26)  Wt(kg): --    I and O's:    07-31 @ 07:01  -  08-01 @ 07:00  --------------------------------------------------------  IN: 135 mL / OUT: 600 mL / NET: -465 mL    08-01 @ 07:01  -  08-01 @ 15:37  --------------------------------------------------------  IN: 0 mL / OUT: 1200 mL / NET: -1200 mL      Height (cm): 172.7 (07-31 @ 19:50)  Weight (kg): 86.2 (07-31 @ 19:50)  BMI (kg/m2): 28.9 (07-31 @ 19:50)  BSA (m2): 2 (07-31 @ 19:50)    PHYSICAL EXAM:    Constitutional: NAD  HEENT: conjunctive   clear   Neck:  No JVD  Respiratory: CTAB  Cardiovascular: S1 and S2  Gastrointestinal: BS+, soft, NT/ND  Extremities: No peripheral edema  : pos  Farris  Skin: dry   Access: Not applicable    LABS:                        11.2   19.54 )-----------( 206      ( 01 Aug 2024 08:33 )             34.2     08-01    144  |  113<H>  |  30<H>  ----------------------------<  114<H>  4.5   |  27  |  0.79    Ca    9.2      01 Aug 2024 08:33  Mg     2.1     08-01    TPro  6.4  /  Alb  2.6<L>  /  TBili  0.8  /  DBili  0.2  /  AST  18  /  ALT  26  /  AlkPhos  65  08-01      Urine Studies:  Urinalysis Basic - ( 01 Aug 2024 08:33 )    Color: x / Appearance: x / SG: x / pH: x  Gluc: 114 mg/dL / Ketone: x  / Bili: x / Urobili: x   Blood: x / Protein: x / Nitrite: x   Leuk Esterase: x / RBC: x / WBC x   Sq Epi: x / Non Sq Epi: x / Bacteria: x            RADIOLOGY & ADDITIONAL STUDIES:

## 2024-08-01 NOTE — CASE MANAGEMENT PROGRESS NOTE - NSCMPROGRESSNOTE_GEN_ALL_CORE
Discussed pt in rounds, pt admitted from [facility] with vomiting and leaking farris. Farris changed in Ed. Receiving IV Rocephin for UTI. WBC 19.44 today. SW following for return to nursing home.

## 2024-08-01 NOTE — CONSULT NOTE ADULT - SUBJECTIVE AND OBJECTIVE BOX
CHIEF COMPLAINT/ REASON FOR VISIT  .. Patient was seen to address the  issue listed under PROBLEM LIST which is located toward bottom of this note     CARISSA CUNNINGHAM    [address]    Allergies    No Known Allergies    Intolerances        PAST MEDICAL & SURGICAL HISTORY:  CAD (coronary artery disease)      HTN (hypertension)      HLD (hyperlipidemia)      S/P CABG x 4      History of cholecystectomy          FAMILY HISTORY:  No pertinent family history in first degree relatives        Home Medications:  acetaminophen 325 mg oral tablet: 2 tab(s) orally every 6 hours As needed Temp greater or equal to 38C (100.4F), Mild Pain (1 - 3) (2023 08:50)  aluminum hydroxide-magnesium hydroxide 200 mg-200 mg/5 mL oral suspension: 30 milliliter(s) orally every 4 hours As needed Dyspepsia (2023 08:50)  Aspir 81 oral delayed release tablet: 1 tab(s) orally once a day.  resume after lovenox course is complete (2023 12:37)  atorvastatin 20 mg oral tablet: 1 tab(s) orally once a day (2023 12:37)  escitalopram 5 mg oral tablet: 1 tab(s) orally once a day (2023 20:07)  hydroCHLOROthiazide 12.5 mg oral tablet: 1 orally once a day  (2023 20:07)  Iron 65mg:  (2023 20:08)  Metoprolol Succinate ER 25 mg oral tablet, extended release: 1 tab(s) orally once a day (2023 12:37)  tamsulosin 0.4 mg oral capsule: 1 cap(s) orally once a day (2023 12:37)      MEDICATIONS  (STANDING):  aspirin enteric coated 81 milliGRAM(s) Oral daily  atorvastatin 10 milliGRAM(s) Oral at bedtime  cefTRIAXone   IVPB 1000 milliGRAM(s) IV Intermittent every 24 hours  escitalopram 5 milliGRAM(s) Oral daily  heparin   Injectable 5000 Unit(s) SubCutaneous every 12 hours  metoprolol succinate ER 25 milliGRAM(s) Oral daily  pantoprazole    Tablet 40 milliGRAM(s) Oral before breakfast  sodium chloride 0.45%. 1000 milliLiter(s) (45 mL/Hr) IV Continuous <Continuous>  tamsulosin 0.4 milliGRAM(s) Oral at bedtime    MEDICATIONS  (PRN):  acetaminophen     Tablet .. 650 milliGRAM(s) Oral every 6 hours PRN Temp greater or equal to 38C (100.4F), Mild Pain (1 - 3)  aluminum hydroxide/magnesium hydroxide/simethicone Suspension 30 milliLiter(s) Oral every 4 hours PRN Dyspepsia      Diet, DASH/TLC:   Sodium & Cholesterol Restricted (24 @ 00:07) [Active]          Vital Signs Last 24 Hrs  T(C): 36.3 (01 Aug 2024 04:40), Max: 37.6 (2024 21:50)  T(F): 97.4 (01 Aug 2024 04:40), Max: 99.6 (2024 21:50)  HR: 55 (01 Aug 2024 04:40) (55 - 112)  BP: 148/65 (01 Aug 2024 04:40) (136/77 - 159/71)  BP(mean): 102 (01 Aug 2024 02:45) (102 - 109)  RR: 19 (01 Aug 2024 04:40) (18 - 19)  SpO2: 94% (01 Aug 2024 04:40) (94% - 96%)    Parameters below as of 01 Aug 2024 04:40  Patient On (Oxygen Delivery Method): room air          24 @ 07:01  -  24 @ 07:00  --------------------------------------------------------  IN: 135 mL / OUT: 600 mL / NET: -465 mL              LABS:                        12.2   23.00 )-----------( 245      ( 2024 21:00 )             36.4         144  |  111<H>  |  36<H>  ----------------------------<  189<H>  4.3   |  26  |  1.30    Ca    9.7      2024 21:00    TPro  7.2  /  Alb  3.1<L>  /  TBili  0.8  /  DBili  x   /  AST  21  /  ALT  30  /  AlkPhos  76        Urinalysis Basic - ( 2024 21:00 )    Color: Yellow / Appearance: Turbid / S.010 / pH: x  Gluc: 189 mg/dL / Ketone: Negative mg/dL  / Bili: Negative / Urobili: 0.2 mg/dL   Blood: x / Protein: 300 mg/dL / Nitrite: Positive   Leuk Esterase: Large / RBC: 11 /HPF / WBC 45 /HPF   Sq Epi: x / Non Sq Epi: x / Bacteria: Too Numerous to count /HPF            WBC:  WBC Count: 23.00 K/uL ( @ 21:00)      MICROBIOLOGY:  RECENT CULTURES:                  Sodium:  Sodium: 144 mmol/L ( @ 21:00)      1.30 mg/dL  @ 21:00      Hemoglobin:  Hemoglobin: 12.2 g/dL ( @ 21:00)      Platelets: Platelet Count - Automated: 245 K/uL ( @ 21:00)      LIVER FUNCTIONS - ( 2024 21:00 )  Alb: 3.1 g/dL / Pro: 7.2 g/dL / ALK PHOS: 76 U/L / ALT: 30 U/L / AST: 21 U/L / GGT: x             Urinalysis Basic - ( 2024 21:00 )    Color: Yellow / Appearance: Turbid / S.010 / pH: x  Gluc: 189 mg/dL / Ketone: Negative mg/dL  / Bili: Negative / Urobili: 0.2 mg/dL   Blood: x / Protein: 300 mg/dL / Nitrite: Positive   Leuk Esterase: Large / RBC: 11 /HPF / WBC 45 /HPF   Sq Epi: x / Non Sq Epi: x / Bacteria: Too Numerous to count /HPF        RADIOLOGY & ADDITIONAL STUDIES:      MICROBIOLOGY:  RECENT CULTURES:

## 2024-08-01 NOTE — H&P ADULT - PROBLEM SELECTOR PLAN 4
Admitted for septic workup and evaluation ,send blood and urine cx ,serial lactate levels ,monitor vitals closely ,ivfs hydration ,monitor urine output and renal profile ,iv abx initiated Patient was ruled in for sepsis 2/2 to acute uti 2/2 to chronic indwelling Wright catheter , poa - repeat blood cx , ID cons informed , patient is on ceftriaxone

## 2024-08-01 NOTE — CONSULT NOTE ADULT - SUBJECTIVE AND OBJECTIVE BOX
Patient is a 89y old  Male who presents with a chief complaint of proteinurea (01 Aug 2024 15:37)      HPI:  89-year-old male brought in by ambulance from [facility] for urine leaking around Wright as well as nausea and vomiting.  Patient states he started not feeling well yesterday, patient nauseous, several episodes of vomiting and diarrhea.  Associated with abdominal pain.  Denies fever, chills, chest pain, shortness of breath, cough, upper or lower extremity weakness or paresthesias.  Patient with chronic indwelling Wright. Found to have UTI and bacteremia Admitted for septic workup and evaluation ,send blood and urine cx ,serial lactate levels ,monitor vitals closely hydration ,monitor urine output and renal profile ,iv abx initiated Palliative care consult requested ,to discuss advance directives and complete MOLST  (01 Aug 2024 08:11)      Asked to see patient for ID Consult    PAST MEDICAL & SURGICAL HISTORY:  CAD (coronary artery disease)      HTN (hypertension)      HLD (hyperlipidemia)      DJD (degenerative joint disease)      Anemia of chronic disease      Enlarged prostate      H/O osteopenia      Urinary retention      Personal history of asbestosis      Hiatal hernia      Hiatal hernia with GERD and esophagitis      Indwelling urinary catheter present      S/P CABG x 4      History of cholecystectomy      H/O kyphoplasty      History of hip surgery      History of appendectomy          Allergies    No Known Allergies    Intolerances        REVIEW OF SYSTEMS:  All systems below were reviewed and are negative [  ]  HEENT:  ID:  Pulmonary:  Cardiac:  GI:  Renal:  Musculoskeletal:  All other systems above were reviewed and are negative   [  ]    HOME MEDICATIONS:    MEDICATIONS  (STANDING):  aspirin enteric coated 81 milliGRAM(s) Oral daily  atorvastatin 10 milliGRAM(s) Oral at bedtime  cefTRIAXone   IVPB 1000 milliGRAM(s) IV Intermittent every 24 hours  escitalopram 5 milliGRAM(s) Oral daily  heparin   Injectable 5000 Unit(s) SubCutaneous every 12 hours  metoprolol succinate ER 25 milliGRAM(s) Oral daily  pantoprazole    Tablet 40 milliGRAM(s) Oral before breakfast  sodium chloride 0.45%. 1000 milliLiter(s) (45 mL/Hr) IV Continuous <Continuous>  tamsulosin 0.4 milliGRAM(s) Oral at bedtime    MEDICATIONS  (PRN):  acetaminophen     Tablet .. 650 milliGRAM(s) Oral every 6 hours PRN Temp greater or equal to 38C (100.4F), Mild Pain (1 - 3)  aluminum hydroxide/magnesium hydroxide/simethicone Suspension 30 milliLiter(s) Oral every 4 hours PRN Dyspepsia  melatonin 3 milliGRAM(s) Oral at bedtime PRN Insomnia  ondansetron Injectable 4 milliGRAM(s) IV Push every 8 hours PRN Nausea and/or Vomiting      Vital Signs Last 24 Hrs  T(C): 36.5 (01 Aug 2024 11:26), Max: 37.6 (31 Jul 2024 21:50)  T(F): 97.7 (01 Aug 2024 11:26), Max: 99.6 (31 Jul 2024 21:50)  HR: 88 (01 Aug 2024 11:26) (55 - 112)  BP: 124/64 (01 Aug 2024 11:26) (124/64 - 159/71)  BP(mean): 102 (01 Aug 2024 02:45) (102 - 109)  RR: 18 (01 Aug 2024 11:26) (18 - 19)  SpO2: 92% (01 Aug 2024 11:26) (92% - 96%)    Parameters below as of 01 Aug 2024 11:26  Patient On (Oxygen Delivery Method): room air        PHYSICAL EXAM:  HEENT:  Neck:  Lungs:  Heart:  Abdomen:  Genital/ Rectal:  Extremities:  Neurologic:  Vascular:    I&O's Summary    31 Jul 2024 07:01  -  01 Aug 2024 07:00  --------------------------------------------------------  IN: 135 mL / OUT: 600 mL / NET: -465 mL    01 Aug 2024 07:01  -  01 Aug 2024 18:02  --------------------------------------------------------  IN: 0 mL / OUT: 1200 mL / NET: -1200 mL        LABORATORY:                          11.2   19.54 )-----------( 206      ( 01 Aug 2024 08:33 )             34.2           08-01    144  |  113<H>  |  30<H>  ----------------------------<  114<H>  4.5   |  27  |  0.79    Ca    9.2      01 Aug 2024 08:33  Mg     2.1     08-01    TPro  6.4  /  Alb  2.6<L>  /  TBili  0.8  /  DBili  0.2  /  AST  18  /  ALT  26  /  AlkPhos  65  08-01          LABORATORY:    CBC Full  -  ( 01 Aug 2024 08:33 )  WBC Count : 19.54 K/uL  RBC Count : 3.30 M/uL  Hemoglobin : 11.2 g/dL  Hematocrit : 34.2 %  Platelet Count - Automated : 206 K/uL  Mean Cell Volume : 103.6 fl  Mean Cell Hemoglobin : 33.9 pg  Mean Cell Hemoglobin Concentration : 32.7 gm/dL  Auto Neutrophil # : x  Auto Lymphocyte # : x  Auto Monocyte # : x  Auto Eosinophil # : x  Auto Basophil # : x  Auto Neutrophil % : x  Auto Lymphocyte % : x  Auto Monocyte % : x  Auto Eosinophil % : x  Auto Basophil % : x          08-01    144  |  113<H>  |  30<H>  ----------------------------<  114<H>  4.5   |  27  |  0.79    Ca    9.2      01 Aug 2024 08:33  Mg     2.1     08-01    TPro  6.4  /  Alb  2.6<L>  /  TBili  0.8  /  DBili  0.2  /  AST  18  /  ALT  26  /  AlkPhos  65  08-01            Blood Culture:           07-31 @ 21:00  Organism --  Gram stain   Growth in aerobic bottle: Gram Negative Rods            Wound culture:                07-31 @ 21:00  Organism --  Culture w/ gram stain --  Specimen Source .Blood Blood-Peripheral        Abscess culture:             07-31 @ 21:00  Organism --  Gram Stain --  Specimen Source .Blood Blood-Peripheral        CSF:              07-31 @ 21:00  Organism --  Gram Stain   Growth in aerobic bottle: Gram Negative Rods        Tissue culture:           07-31 @ 21:00  Organism --  Gram Stain   Growth in aerobic bottle: Gram Negative Rods  Specimen Source .Blood Blood-Peripheral        Body Fluid Smear & Culture:                        07-31 @ 21:00  AFB Smear  --  Culture Acid Fast Body Fluid w/ Smear  --  Culture Acid Fast Smear Concentrated   --    Culture Results:       Growth in aerobic bottle: Gram Negative Rods  Direct identification is available within approximately 3-5  hours either by Blood Panel Multiplexed PCR or Direct  MALDI-TOF. Details: [email or web address]  Specimen Source .Blood Blood-Peripheral                   Patient is a 89y old  Male who presents with a chief complaint of proteinurea (01 Aug 2024 15:37)      HPI:  89-year-old male brought in by ambulance from [facility] for urine leaking around Farris as well as nausea and vomiting.  Patient states he started not feeling well yesterday, patient nauseous, several episodes of vomiting and diarrhea.  Associated with abdominal pain.  Denies fever, chills, chest pain, shortness of breath, cough, upper or lower extremity weakness or paresthesias.  Patient with chronic indwelling Farris. Found to have UTI and bacteremia Admitted for septic workup and evaluation ,send blood and urine cx ,serial lactate levels ,monitor vitals closely hydration ,monitor urine output and renal profile ,iv abx initiated Palliative care consult requested ,to discuss advance directives and complete MOLST  (01 Aug 2024 08:11)      Asked to see patient for ID Consult    PAST MEDICAL & SURGICAL HISTORY:  CAD (coronary artery disease)      HTN (hypertension)      HLD (hyperlipidemia)      DJD (degenerative joint disease)      Anemia of chronic disease      Enlarged prostate      H/O osteopenia      Urinary retention      Personal history of asbestosis      Hiatal hernia      Hiatal hernia with GERD and esophagitis      Indwelling urinary catheter present      S/P CABG x 4      History of cholecystectomy      H/O kyphoplasty      History of hip surgery      History of appendectomy          Allergies    No Known Allergies    Intolerances        REVIEW OF SYSTEMS:  All systems below were reviewed and are negative [  ]  HEENT:  ID:  Pulmonary:  Cardiac:  GI:  Renal:  Musculoskeletal:  All other systems above were reviewed and are negative   [  ]    HOME MEDICATIONS:    MEDICATIONS  (STANDING):  aspirin enteric coated 81 milliGRAM(s) Oral daily  atorvastatin 10 milliGRAM(s) Oral at bedtime  cefTRIAXone   IVPB 1000 milliGRAM(s) IV Intermittent every 24 hours  escitalopram 5 milliGRAM(s) Oral daily  heparin   Injectable 5000 Unit(s) SubCutaneous every 12 hours  metoprolol succinate ER 25 milliGRAM(s) Oral daily  pantoprazole    Tablet 40 milliGRAM(s) Oral before breakfast  sodium chloride 0.45%. 1000 milliLiter(s) (45 mL/Hr) IV Continuous <Continuous>  tamsulosin 0.4 milliGRAM(s) Oral at bedtime    MEDICATIONS  (PRN):  acetaminophen     Tablet .. 650 milliGRAM(s) Oral every 6 hours PRN Temp greater or equal to 38C (100.4F), Mild Pain (1 - 3)  aluminum hydroxide/magnesium hydroxide/simethicone Suspension 30 milliLiter(s) Oral every 4 hours PRN Dyspepsia  melatonin 3 milliGRAM(s) Oral at bedtime PRN Insomnia  ondansetron Injectable 4 milliGRAM(s) IV Push every 8 hours PRN Nausea and/or Vomiting      Vital Signs Last 24 Hrs  T(C): 36.5 (01 Aug 2024 11:26), Max: 37.6 (31 Jul 2024 21:50)  T(F): 97.7 (01 Aug 2024 11:26), Max: 99.6 (31 Jul 2024 21:50)  HR: 88 (01 Aug 2024 11:26) (55 - 112)  BP: 124/64 (01 Aug 2024 11:26) (124/64 - 159/71)  BP(mean): 102 (01 Aug 2024 02:45) (102 - 109)  RR: 18 (01 Aug 2024 11:26) (18 - 19)  SpO2: 92% (01 Aug 2024 11:26) (92% - 96%)    Parameters below as of 01 Aug 2024 11:26  Patient On (Oxygen Delivery Method): room air        PHYSICAL EXAM:  HEENT:  Neck:  Lungs:  Heart:  Abdomen:  Genital/ Rectal:  Extremities:  Neurologic:  Vascular:    I&O's Summary    31 Jul 2024 07:01  -  01 Aug 2024 07:00  --------------------------------------------------------  IN: 135 mL / OUT: 600 mL / NET: -465 mL    01 Aug 2024 07:01  -  01 Aug 2024 18:02  --------------------------------------------------------  IN: 0 mL / OUT: 1200 mL / NET: -1200 mL        LABORATORY:                          11.2   19.54 )-----------( 206      ( 01 Aug 2024 08:33 )             34.2           08-01    144  |  113<H>  |  30<H>  ----------------------------<  114<H>  4.5   |  27  |  0.79    Ca    9.2      01 Aug 2024 08:33  Mg     2.1     08-01    TPro  6.4  /  Alb  2.6<L>  /  TBili  0.8  /  DBili  0.2  /  AST  18  /  ALT  26  /  AlkPhos  65  08-01          LABORATORY:    CBC Full  -  ( 01 Aug 2024 08:33 )  WBC Count : 19.54 K/uL  RBC Count : 3.30 M/uL  Hemoglobin : 11.2 g/dL  Hematocrit : 34.2 %  Platelet Count - Automated : 206 K/uL  Mean Cell Volume : 103.6 fl  Mean Cell Hemoglobin : 33.9 pg  Mean Cell Hemoglobin Concentration : 32.7 gm/dL  Auto Neutrophil # : x  Auto Lymphocyte # : x  Auto Monocyte # : x  Auto Eosinophil # : x  Auto Basophil # : x  Auto Neutrophil % : x  Auto Lymphocyte % : x  Auto Monocyte % : x  Auto Eosinophil % : x  Auto Basophil % : x          08-01    144  |  113<H>  |  30<H>  ----------------------------<  114<H>  4.5   |  27  |  0.79    Ca    9.2      01 Aug 2024 08:33  Mg     2.1     08-01    TPro  6.4  /  Alb  2.6<L>  /  TBili  0.8  /  DBili  0.2  /  AST  18  /  ALT  26  /  AlkPhos  65  08-01            Blood Culture:           07-31 @ 21:00  Organism --  Gram stain   Growth in aerobic bottle: Gram Negative Rods            Wound culture:                07-31 @ 21:00  Organism --  Culture w/ gram stain --  Specimen Source .Blood Blood-Peripheral        Abscess culture:             07-31 @ 21:00  Organism --  Gram Stain --  Specimen Source .Blood Blood-Peripheral        CSF:              07-31 @ 21:00  Organism --  Gram Stain   Growth in aerobic bottle: Gram Negative Rods        Tissue culture:           07-31 @ 21:00  Organism --  Gram Stain   Growth in aerobic bottle: Gram Negative Rods  Specimen Source .Blood Blood-Peripheral        Body Fluid Smear & Culture:                        07-31 @ 21:00  AFB Smear  --  Culture Acid Fast Body Fluid w/ Smear  --  Culture Acid Fast Smear Concentrated   --    Culture Results:       Growth in aerobic bottle: Gram Negative Rods  Direct identification is available within approximately 3-5  hours either by Blood Panel Multiplexed PCR or Direct  MALDI-TOF. Details: [email or web address]  Specimen Source .Blood Blood-Peripheral      Assessment and plan:    1. Sepsis due to UTI.  2. Bacteremia with gram negative rods.  3. Chronic urinary retention with indwelling farris    . Repeat blood culture  , Continue IV Rocephin for now  . Supportive care,    Thank you              Patient is a 89y old  Male who presents with a chief complaint of proteinurea (01 Aug 2024 15:37)        The patient is a 89-year-old male brought in by ambulance from [facility] for urine leaking around Farris as well as nausea and vomiting.  Patient states he started not feeling well yesterday, patient nauseous, several episodes of vomiting and diarrhea.  Associated with abdominal pain.  Denies fever, chills, chest pain, shortness of breath, cough, upper or lower extremity weakness or paresthesias.  Patient with chronic indwelling Farris. Found to have UTI and bacteremia Admitted for septic workup and evaluation ,send blood and urine cx ,serial lactate levels ,monitor vitals closely hydration ,monitor urine output and renal profile ,iv abx initiated Palliative care consult requested ,to discuss advance directives and complete MOLST  (01 Aug 2024 08:11)      Asked to see patient for ID Consult    PAST MEDICAL & SURGICAL HISTORY:  CAD (coronary artery disease)      HTN (hypertension)      HLD (hyperlipidemia)      DJD (degenerative joint disease)      Anemia of chronic disease      Enlarged prostate      H/O osteopenia      Urinary retention      Personal history of asbestosis      Hiatal hernia      Hiatal hernia with GERD and esophagitis      Indwelling urinary catheter present      S/P CABG x 4      History of cholecystectomy      H/O kyphoplasty      History of hip surgery      History of appendectomy          Allergies    No Known Allergies    Intolerances        REVIEW OF SYSTEMS:  No cough or SOB  No diarrhea.    HOME MEDICATIONS:    MEDICATIONS  (STANDING):  aspirin enteric coated 81 milliGRAM(s) Oral daily  atorvastatin 10 milliGRAM(s) Oral at bedtime  cefTRIAXone   IVPB 1000 milliGRAM(s) IV Intermittent every 24 hours  escitalopram 5 milliGRAM(s) Oral daily  heparin   Injectable 5000 Unit(s) SubCutaneous every 12 hours  metoprolol succinate ER 25 milliGRAM(s) Oral daily  pantoprazole    Tablet 40 milliGRAM(s) Oral before breakfast  sodium chloride 0.45%. 1000 milliLiter(s) (45 mL/Hr) IV Continuous <Continuous>  tamsulosin 0.4 milliGRAM(s) Oral at bedtime    MEDICATIONS  (PRN):  acetaminophen     Tablet .. 650 milliGRAM(s) Oral every 6 hours PRN Temp greater or equal to 38C (100.4F), Mild Pain (1 - 3)  aluminum hydroxide/magnesium hydroxide/simethicone Suspension 30 milliLiter(s) Oral every 4 hours PRN Dyspepsia  melatonin 3 milliGRAM(s) Oral at bedtime PRN Insomnia  ondansetron Injectable 4 milliGRAM(s) IV Push every 8 hours PRN Nausea and/or Vomiting      Vital Signs Last 24 Hrs  T(C): 36.5 (01 Aug 2024 11:26), Max: 37.6 (31 Jul 2024 21:50)  T(F): 97.7 (01 Aug 2024 11:26), Max: 99.6 (31 Jul 2024 21:50)  HR: 88 (01 Aug 2024 11:26) (55 - 112)  BP: 124/64 (01 Aug 2024 11:26) (124/64 - 159/71)  BP(mean): 102 (01 Aug 2024 02:45) (102 - 109)  RR: 18 (01 Aug 2024 11:26) (18 - 19)  SpO2: 92% (01 Aug 2024 11:26) (92% - 96%)    Parameters below as of 01 Aug 2024 11:26  Patient On (Oxygen Delivery Method): room air        PHYSICAL EXAM:  HEENT: NC/AT. PERRLA  Neck: Soft.   Lungs: Coarse BS bilaterally. No wheezing.  Heart: RRR, no murmurs.   Abdomen: Soft, no tenderness.   Genital/ Rectal: Farris catheter with clear urine.  Extremities: No ulcers  Neurologic: Confused.       I&O's Summary    31 Jul 2024 07:01  -  01 Aug 2024 07:00  --------------------------------------------------------  IN: 135 mL / OUT: 600 mL / NET: -465 mL    01 Aug 2024 07:01  -  01 Aug 2024 18:02  --------------------------------------------------------  IN: 0 mL / OUT: 1200 mL / NET: -1200 mL        LABORATORY:                          11.2   19.54 )-----------( 206      ( 01 Aug 2024 08:33 )             34.2           08-01    144  |  113<H>  |  30<H>  ----------------------------<  114<H>  4.5   |  27  |  0.79    Ca    9.2      01 Aug 2024 08:33  Mg     2.1     08-01    TPro  6.4  /  Alb  2.6<L>  /  TBili  0.8  /  DBili  0.2  /  AST  18  /  ALT  26  /  AlkPhos  65  08-01          LABORATORY:    CBC Full  -  ( 01 Aug 2024 08:33 )  WBC Count : 19.54 K/uL  RBC Count : 3.30 M/uL  Hemoglobin : 11.2 g/dL  Hematocrit : 34.2 %  Platelet Count - Automated : 206 K/uL  Mean Cell Volume : 103.6 fl  Mean Cell Hemoglobin : 33.9 pg  Mean Cell Hemoglobin Concentration : 32.7 gm/dL  Auto Neutrophil # : x  Auto Lymphocyte # : x  Auto Monocyte # : x  Auto Eosinophil # : x  Auto Basophil # : x  Auto Neutrophil % : x  Auto Lymphocyte % : x  Auto Monocyte % : x  Auto Eosinophil % : x  Auto Basophil % : x          08-01    144  |  113<H>  |  30<H>  ----------------------------<  114<H>  4.5   |  27  |  0.79    Ca    9.2      01 Aug 2024 08:33  Mg     2.1     08-01    TPro  6.4  /  Alb  2.6<L>  /  TBili  0.8  /  DBili  0.2  /  AST  18  /  ALT  26  /  AlkPhos  65  08-01            Blood Culture:           07-31 @ 21:00  Organism --  Gram stain   Growth in aerobic bottle: Gram Negative Rods            Wound culture:                07-31 @ 21:00  Organism --  Culture w/ gram stain --  Specimen Source .Blood Blood-Peripheral        Abscess culture:             07-31 @ 21:00  Organism --  Gram Stain --  Specimen Source .Blood Blood-Peripheral        CSF:              07-31 @ 21:00  Organism --  Gram Stain   Growth in aerobic bottle: Gram Negative Rods        Tissue culture:           07-31 @ 21:00  Organism --  Gram Stain   Growth in aerobic bottle: Gram Negative Rods  Specimen Source .Blood Blood-Peripheral        Body Fluid Smear & Culture:                        07-31 @ 21:00  AFB Smear  --  Culture Acid Fast Body Fluid w/ Smear  --  Culture Acid Fast Smear Concentrated   --    Culture Results:       Growth in aerobic bottle: Gram Negative Rods  Direct identification is available within approximately 3-5  hours either by Blood Panel Multiplexed PCR or Direct  MALDI-TOF. Details: [email or web address]  Specimen Source .Blood Blood-Peripheral      Assessment and plan:    1. Sepsis due to UTI.  2. Bacteremia with gram negative rods.  3. Chronic urinary retention with indwelling farris  4. Weakness.      . Repeat blood culture  , Continue IV Rocephin for now  . Supportive care,    Thank you

## 2024-08-01 NOTE — H&P ADULT - PROBLEM SELECTOR PLAN 5
Admitted for septic workup and evaluation ,send blood and urine cx ,serial lactate levels ,monitor vitals closely hydration ,monitor urine output and renal profile, iv abx initiated Patient was ruled in for sepsis 2/2 to acute uti 2/2 to chronic indwelling Wright catheter , poa - repeat blood cx , ID cons informed , patient is on ceftriaxone

## 2024-08-01 NOTE — H&P ADULT - NSICDXPASTSURGICALHX_GEN_ALL_CORE_FT
PAST SURGICAL HISTORY:  History of cholecystectomy     S/P CABG x 4      PAST SURGICAL HISTORY:  H/O kyphoplasty     History of appendectomy     History of cholecystectomy     History of hip surgery     S/P CABG x 4

## 2024-08-01 NOTE — H&P ADULT - PROBLEM SELECTOR PLAN 1
Patient found to have wbc of 23 and lac 2.8, fluids and abx added 07/31 . Wright was attempted to flush by RN, unable to flush. Wright changed by ER provider , gross pyuria in Wright.  and ID consults called , iv abx started , septic workup sent , iv hydration ,serial lactate .Patient was ruled in for sepsis 2/2 to acute uti 2/2 to chronic indwelling Wright catheter , poa - repeat blood cx , ID cons informed , patient is on ceftriaxone

## 2024-08-01 NOTE — H&P ADULT - NSICDXPASTMEDICALHX_GEN_ALL_CORE_FT
PAST MEDICAL HISTORY:  CAD (coronary artery disease)     HLD (hyperlipidemia)     HTN (hypertension)      PAST MEDICAL HISTORY:  Anemia of chronic disease     CAD (coronary artery disease)     DJD (degenerative joint disease)     Enlarged prostate     H/O osteopenia     Hiatal hernia     Hiatal hernia with GERD and esophagitis     HLD (hyperlipidemia)     HTN (hypertension)     Indwelling urinary catheter present     Personal history of asbestosis     Urinary retention

## 2024-08-01 NOTE — PROGRESS NOTE ADULT - SUBJECTIVE AND OBJECTIVE BOX
Chart reviewed  The patient is a 89-year-old male with indwelling farris for urinary retention who was brought in by ambulance from [facility] for urine leaking around Farris as well as nausea and vomiting.  Patient states he started not feeling well yesterday, patient nauseous, several episodes of vomiting and diarrhea and abdominal pain.    . UA was very suggestuive of UTI,   Agree with iv Rocephin,  . Full consult to follow

## 2024-08-02 LAB
ALBUMIN SERPL ELPH-MCNC: 2.3 G/DL — LOW (ref 3.3–5)
ALP SERPL-CCNC: 51 U/L — SIGNIFICANT CHANGE UP (ref 40–120)
ALT FLD-CCNC: 22 U/L — SIGNIFICANT CHANGE UP (ref 12–78)
ANION GAP SERPL CALC-SCNC: 3 MMOL/L — LOW (ref 5–17)
AST SERPL-CCNC: 16 U/L — SIGNIFICANT CHANGE UP (ref 15–37)
BASOPHILS # BLD AUTO: 0.02 K/UL — SIGNIFICANT CHANGE UP (ref 0–0.2)
BASOPHILS NFR BLD AUTO: 0.2 % — SIGNIFICANT CHANGE UP (ref 0–2)
BILIRUB SERPL-MCNC: 0.6 MG/DL — SIGNIFICANT CHANGE UP (ref 0.2–1.2)
BUN SERPL-MCNC: 20 MG/DL — SIGNIFICANT CHANGE UP (ref 7–23)
CALCIUM SERPL-MCNC: 8.6 MG/DL — SIGNIFICANT CHANGE UP (ref 8.5–10.1)
CHLORIDE SERPL-SCNC: 111 MMOL/L — HIGH (ref 96–108)
CO2 SERPL-SCNC: 28 MMOL/L — SIGNIFICANT CHANGE UP (ref 22–31)
CREAT SERPL-MCNC: 0.57 MG/DL — SIGNIFICANT CHANGE UP (ref 0.5–1.3)
EGFR: 93 ML/MIN/1.73M2 — SIGNIFICANT CHANGE UP
EOSINOPHIL # BLD AUTO: 0.05 K/UL — SIGNIFICANT CHANGE UP (ref 0–0.5)
EOSINOPHIL NFR BLD AUTO: 0.4 % — SIGNIFICANT CHANGE UP (ref 0–6)
GLUCOSE SERPL-MCNC: 91 MG/DL — SIGNIFICANT CHANGE UP (ref 70–99)
HCT VFR BLD CALC: 30 % — LOW (ref 39–50)
HGB BLD-MCNC: 9.6 G/DL — LOW (ref 13–17)
IMM GRANULOCYTES NFR BLD AUTO: 0.4 % — SIGNIFICANT CHANGE UP (ref 0–0.9)
LYMPHOCYTES # BLD AUTO: 1.16 K/UL — SIGNIFICANT CHANGE UP (ref 1–3.3)
LYMPHOCYTES # BLD AUTO: 9.8 % — LOW (ref 13–44)
MCHC RBC-ENTMCNC: 32 GM/DL — SIGNIFICANT CHANGE UP (ref 32–36)
MCHC RBC-ENTMCNC: 33.3 PG — SIGNIFICANT CHANGE UP (ref 27–34)
MCV RBC AUTO: 104.2 FL — HIGH (ref 80–100)
MONOCYTES # BLD AUTO: 1.27 K/UL — HIGH (ref 0–0.9)
MONOCYTES NFR BLD AUTO: 10.8 % — SIGNIFICANT CHANGE UP (ref 2–14)
NEUTROPHILS # BLD AUTO: 9.23 K/UL — HIGH (ref 1.8–7.4)
NEUTROPHILS NFR BLD AUTO: 78.4 % — HIGH (ref 43–77)
NRBC # BLD: 0 /100 WBCS — SIGNIFICANT CHANGE UP (ref 0–0)
PLATELET # BLD AUTO: 166 K/UL — SIGNIFICANT CHANGE UP (ref 150–400)
POTASSIUM SERPL-MCNC: 3.8 MMOL/L — SIGNIFICANT CHANGE UP (ref 3.5–5.3)
POTASSIUM SERPL-SCNC: 3.8 MMOL/L — SIGNIFICANT CHANGE UP (ref 3.5–5.3)
PROT SERPL-MCNC: 5.5 G/DL — LOW (ref 6–8.3)
RBC # BLD: 2.88 M/UL — LOW (ref 4.2–5.8)
RBC # FLD: 16.4 % — HIGH (ref 10.3–14.5)
SODIUM SERPL-SCNC: 142 MMOL/L — SIGNIFICANT CHANGE UP (ref 135–145)
URATE SERPL-MCNC: 3.5 MG/DL — SIGNIFICANT CHANGE UP (ref 3.4–8.8)
WBC # BLD: 11.78 K/UL — HIGH (ref 3.8–10.5)
WBC # FLD AUTO: 11.78 K/UL — HIGH (ref 3.8–10.5)

## 2024-08-02 PROCEDURE — 99233 SBSQ HOSP IP/OBS HIGH 50: CPT

## 2024-08-02 RX ADMIN — Medication 5 MILLIGRAM(S): at 12:18

## 2024-08-02 RX ADMIN — Medication 81 MILLIGRAM(S): at 12:18

## 2024-08-02 RX ADMIN — HEPARIN SODIUM 5000 UNIT(S): 1000 INJECTION, SOLUTION INTRAVENOUS; SUBCUTANEOUS at 05:33

## 2024-08-02 RX ADMIN — PANTOPRAZOLE SODIUM 40 MILLIGRAM(S): 20 TABLET, DELAYED RELEASE ORAL at 05:34

## 2024-08-02 RX ADMIN — Medication 25 MILLIGRAM(S): at 05:33

## 2024-08-02 RX ADMIN — Medication 100 MILLIGRAM(S): at 22:00

## 2024-08-02 RX ADMIN — Medication 0.4 MILLIGRAM(S): at 22:01

## 2024-08-02 RX ADMIN — HEPARIN SODIUM 5000 UNIT(S): 1000 INJECTION, SOLUTION INTRAVENOUS; SUBCUTANEOUS at 19:41

## 2024-08-02 RX ADMIN — ATORVASTATIN CALCIUM 10 MILLIGRAM(S): 40 TABLET, FILM COATED ORAL at 22:01

## 2024-08-02 NOTE — PROGRESS NOTE ADULT - SUBJECTIVE AND OBJECTIVE BOX
CHIEF COMPLAINT/ REASON FOR VISIT  .. Patient was seen to address the  issue listed under PROBLEM LIST which is located toward bottom of this note     CARISSA CUNNINGHAM    [facility] [address]    Allergies    No Known Allergies    Intolerances        PAST MEDICAL & SURGICAL HISTORY:  CAD (coronary artery disease)      HTN (hypertension)      HLD (hyperlipidemia)      DJD (degenerative joint disease)      Anemia of chronic disease      Enlarged prostate      H/O osteopenia      Urinary retention      Personal history of asbestosis      Hiatal hernia      Hiatal hernia with GERD and esophagitis      Indwelling urinary catheter present      S/P CABG x 4      History of cholecystectomy      H/O kyphoplasty      History of hip surgery      History of appendectomy          FAMILY HISTORY:  No pertinent family history in first degree relatives        Home Medications:  ascorbic acid 500 mg oral tablet: 2 tab(s) orally once a day (01 Aug 2024 11:04)  aspirin 81 mg oral delayed release tablet: 1 tab(s) orally once a day (01 Aug 2024 11:04)  atorvastatin 20 mg oral tablet: 1 tab(s) orally once a day (at bedtime) (01 Aug 2024 11:04)  cyanocobalamin 1000 mcg oral tablet: 1 tab(s) orally once a day (01 Aug 2024 11:04)  Dulcolax Laxative 10 mg rectal suppository: 1 suppository(ies) rectally as needed for  constipation (01 Aug 2024 11:04)  escitalopram 10 mg oral tablet: 1 tab(s) orally once a day (01 Aug 2024 11:04)  ferrous sulfate 325 mg (65 mg elemental iron) oral tablet: 1 tab(s) orally once a day (01 Aug 2024 11:04)  finasteride 5 mg oral tablet: 1 tab(s) orally once a day (01 Aug 2024 11:04)  Fleet Enema 19 g-7 g rectal enema: 133 milliliter(s) rectally as needed for  constipation (01 Aug 2024 11:04)  melatonin 5 mg oral tablet: 1 tab(s) orally once a day (01 Aug 2024 11:04)  Metoprolol Succinate ER 25 mg oral tablet, extended release: 1 tab(s) orally once a day (01 Aug 2024 11:04)  Milk of Magnesia 1200 mg/15 mL oral liquid: 30 milliliter(s) orally once a day as needed for  constipation (01 Aug 2024 11:04)  Multiple Vitamins with Minerals oral tablet: 1 tab(s) orally once a day (01 Aug 2024 11:04)  ondansetron 4 mg oral tablet: 1 tab(s) orally every 8 hours (01 Aug 2024 11:03)  tamsulosin 0.4 mg oral capsule: 1 cap(s) orally once a day (in the evening) (01 Aug 2024 11:04)      MEDICATIONS  (STANDING):  aspirin enteric coated 81 milliGRAM(s) Oral daily  atorvastatin 10 milliGRAM(s) Oral at bedtime  cefTRIAXone   IVPB 1000 milliGRAM(s) IV Intermittent every 24 hours  escitalopram 5 milliGRAM(s) Oral daily  heparin   Injectable 5000 Unit(s) SubCutaneous every 12 hours  metoprolol succinate ER 25 milliGRAM(s) Oral daily  pantoprazole    Tablet 40 milliGRAM(s) Oral before breakfast  sodium chloride 0.45%. 1000 milliLiter(s) (45 mL/Hr) IV Continuous <Continuous>  tamsulosin 0.4 milliGRAM(s) Oral at bedtime    MEDICATIONS  (PRN):  acetaminophen     Tablet .. 650 milliGRAM(s) Oral every 6 hours PRN Temp greater or equal to 38C (100.4F), Mild Pain (1 - 3)  aluminum hydroxide/magnesium hydroxide/simethicone Suspension 30 milliLiter(s) Oral every 4 hours PRN Dyspepsia  melatonin 3 milliGRAM(s) Oral at bedtime PRN Insomnia  ondansetron Injectable 4 milliGRAM(s) IV Push every 8 hours PRN Nausea and/or Vomiting      Diet, DASH/TLC:   Sodium & Cholesterol Restricted (08-01-24 @ 00:07) [Active]          Vital Signs Last 24 Hrs  T(C): 36.4 (02 Aug 2024 04:43), Max: 36.6 (01 Aug 2024 20:11)  T(F): 97.6 (02 Aug 2024 04:43), Max: 97.9 (01 Aug 2024 20:11)  HR: 67 (02 Aug 2024 04:43) (67 - 72)  BP: 134/61 (02 Aug 2024 04:43) (131/66 - 134/61)  BP(mean): --  RR: 18 (02 Aug 2024 04:43) (18 - 18)  SpO2: 95% (02 Aug 2024 04:43) (93% - 95%)    Parameters below as of 02 Aug 2024 04:43  Patient On (Oxygen Delivery Method): room air          08-01-24 @ 07:01  -  08-02-24 @ 07:00  --------------------------------------------------------  IN: 635 mL / OUT: 1700 mL / NET: -1065 mL              LABS:                        9.6    11.78 )-----------( 166      ( 02 Aug 2024 06:45 )             30.0     08-02    142  |  111<H>  |  20  ----------------------------<  91  3.8   |  28  |  0.57    Ca    8.6      02 Aug 2024 06:45  Mg     2.1     08-01    TPro  5.5<L>  /  Alb  2.3<L>  /  TBili  0.6  /  DBili  x   /  AST  16  /  ALT  22  /  AlkPhos  51  08-02      Urinalysis Basic - ( 02 Aug 2024 06:45 )    Color: x / Appearance: x / SG: x / pH: x  Gluc: 91 mg/dL / Ketone: x  / Bili: x / Urobili: x   Blood: x / Protein: x / Nitrite: x   Leuk Esterase: x / RBC: x / WBC x   Sq Epi: x / Non Sq Epi: x / Bacteria: x            WBC:  WBC Count: 11.78 K/uL (08-02 @ 06:45)  WBC Count: 19.54 K/uL (08-01 @ 08:33)  WBC Count: 23.00 K/uL (07-31 @ 21:00)      MICROBIOLOGY:  RECENT CULTURES:  07-31 .Blood Blood-Peripheral Blood Culture PCR   Growth in aerobic bottle: Gram Negative Rods   Growth in aerobic bottle: Proteus mirabilis  Direct identification is available within approximately 3-5  hours either by Blood Panel Multiplexed PCR or Direct  MALDI-TOF. Details: [email or web address]    07-31 .Blood Blood-Peripheral XXXX XXXX   No growth at 24 hours                    Sodium:  Sodium: 142 mmol/L (08-02 @ 06:45)  Sodium: 144 mmol/L (08-01 @ 08:33)  Sodium: 144 mmol/L (07-31 @ 21:00)      0.57 mg/dL 08-02 @ 06:45  0.79 mg/dL 08-01 @ 08:33  1.30 mg/dL 07-31 @ 21:00      Hemoglobin:  Hemoglobin: 9.6 g/dL (08-02 @ 06:45)  Hemoglobin: 11.2 g/dL (08-01 @ 08:33)  Hemoglobin: 12.2 g/dL (07-31 @ 21:00)      Platelets: Platelet Count - Automated: 166 K/uL (08-02 @ 06:45)  Platelet Count - Automated: 206 K/uL (08-01 @ 08:33)  Platelet Count - Automated: 245 K/uL (07-31 @ 21:00)      LIVER FUNCTIONS - ( 02 Aug 2024 06:45 )  Alb: 2.3 g/dL / Pro: 5.5 g/dL / ALK PHOS: 51 U/L / ALT: 22 U/L / AST: 16 U/L / GGT: x             Urinalysis Basic - ( 02 Aug 2024 06:45 )    Color: x / Appearance: x / SG: x / pH: x  Gluc: 91 mg/dL / Ketone: x  / Bili: x / Urobili: x   Blood: x / Protein: x / Nitrite: x   Leuk Esterase: x / RBC: x / WBC x   Sq Epi: x / Non Sq Epi: x / Bacteria: x        RADIOLOGY & ADDITIONAL STUDIES:      MICROBIOLOGY:  RECENT CULTURES:  07-31 .Blood Blood-Peripheral Blood Culture PCR   Growth in aerobic bottle: Gram Negative Rods   Growth in aerobic bottle: Proteus mirabilis  Direct identification is available within approximately 3-5  hours either by Blood Panel Multiplexed PCR or Direct  MALDI-TOF. Details: [email or web address]    07-31 .Blood Blood-Peripheral XXXX XXXX   No growth at 24 hours

## 2024-08-02 NOTE — CASE MANAGEMENT PROGRESS NOTE - NSCMPROGRESSNOTE_GEN_ALL_CORE
Discussed pt in rounds, pt remains acute, receiving IV antibiotics, IVF. SW following for return to [facility].

## 2024-08-02 NOTE — PROGRESS NOTE ADULT - SUBJECTIVE AND OBJECTIVE BOX
Patient is a 89y Male whom presented to the hospital with proteinurea     PAST MEDICAL & SURGICAL HISTORY:  CAD (coronary artery disease)      HTN (hypertension)      HLD (hyperlipidemia)      S/P CABG x 4      History of cholecystectomy          MEDICATIONS  (STANDING):  aspirin enteric coated 81 milliGRAM(s) Oral daily  atorvastatin 10 milliGRAM(s) Oral at bedtime  cefTRIAXone   IVPB 1000 milliGRAM(s) IV Intermittent every 24 hours  escitalopram 5 milliGRAM(s) Oral daily  heparin   Injectable 5000 Unit(s) SubCutaneous every 12 hours  metoprolol succinate ER 25 milliGRAM(s) Oral daily  pantoprazole    Tablet 40 milliGRAM(s) Oral before breakfast  sodium chloride 0.45%. 1000 milliLiter(s) (45 mL/Hr) IV Continuous <Continuous>  tamsulosin 0.4 milliGRAM(s) Oral at bedtime      Allergies    No Known Allergies    Intolerances        SOCIAL HISTORY:  Denies ETOh,Smoking,     FAMILY HISTORY:  No pertinent family history in first degree relatives        REVIEW OF SYSTEMS:    CONSTITUTIONAL: No weakness, fevers or chills  RESPIRATORY: No cough, wheezing, hemoptysis; No shortness of breath  CARDIOVASCULAR: No chest pain or palpitations  GENITOURINARY: No dysuria, frequency or hematuria  NEUROLOGICAL: No numbness or weakness  SKIN: dry                                      9.6    11.78 )-----------( 166      ( 02 Aug 2024 06:45 )             30.0       CBC Full  -  ( 02 Aug 2024 06:45 )  WBC Count : 11.78 K/uL  RBC Count : 2.88 M/uL  Hemoglobin : 9.6 g/dL  Hematocrit : 30.0 %  Platelet Count - Automated : 166 K/uL  Mean Cell Volume : 104.2 fl  Mean Cell Hemoglobin : 33.3 pg  Mean Cell Hemoglobin Concentration : 32.0 gm/dL  Auto Neutrophil # : 9.23 K/uL  Auto Lymphocyte # : 1.16 K/uL  Auto Monocyte # : 1.27 K/uL  Auto Eosinophil # : 0.05 K/uL  Auto Basophil # : 0.02 K/uL  Auto Neutrophil % : 78.4 %  Auto Lymphocyte % : 9.8 %  Auto Monocyte % : 10.8 %  Auto Eosinophil % : 0.4 %  Auto Basophil % : 0.2 %      08-02    142  |  111<H>  |  20  ----------------------------<  91  3.8   |  28  |  0.57    Ca    8.6      02 Aug 2024 06:45  Mg     2.1     08-01    TPro  5.5<L>  /  Alb  2.3<L>  /  TBili  0.6  /  DBili  x   /  AST  16  /  ALT  22  /  AlkPhos  51  08-02      CAPILLARY BLOOD GLUCOSE          Vital Signs Last 24 Hrs  T(C): 37 (02 Aug 2024 11:51), Max: 37 (02 Aug 2024 11:51)  T(F): 98.6 (02 Aug 2024 11:51), Max: 98.6 (02 Aug 2024 11:51)  HR: 58 (02 Aug 2024 11:51) (58 - 72)  BP: 107/60 (02 Aug 2024 11:51) (107/60 - 134/61)  BP(mean): --  RR: 18 (02 Aug 2024 11:51) (18 - 18)  SpO2: 92% (02 Aug 2024 11:51) (92% - 95%)    Parameters below as of 02 Aug 2024 11:51  Patient On (Oxygen Delivery Method): room air        Urinalysis Basic - ( 02 Aug 2024 06:45 )    Color: x / Appearance: x / SG: x / pH: x  Gluc: 91 mg/dL / Ketone: x  / Bili: x / Urobili: x   Blood: x / Protein: x / Nitrite: x   Leuk Esterase: x / RBC: x / WBC x   Sq Epi: x / Non Sq Epi: x / Bacteria: x          PHYSICAL EXAM:    Constitutional: NAD  HEENT: conjunctive   clear   Neck:  No JVD  Respiratory: CTAB  Cardiovascular: S1 and S2  Gastrointestinal: BS+, soft, NT/ND  Extremities: No peripheral edema  : pos  Wright  Skin: dry   Access: Not applicable

## 2024-08-02 NOTE — PROGRESS NOTE ADULT - SUBJECTIVE AND OBJECTIVE BOX
Interval History:    CENTRAL LINE:   [  ] YES       [  ] NO  DAVIS:                 [  ] YES       [  ] NO         REVIEW OF SYSTEMS:  All Systems below were reviewed and are negative [  ]  HEENT:  ID:  Pulmonary:  Cardiac:  GI:  Renal:  Musculoskeletal:  All other systems above were reviewed and are negative   [  ]      MEDICATIONS  (STANDING):  aspirin enteric coated 81 milliGRAM(s) Oral daily  atorvastatin 10 milliGRAM(s) Oral at bedtime  cefTRIAXone   IVPB 1000 milliGRAM(s) IV Intermittent every 24 hours  escitalopram 5 milliGRAM(s) Oral daily  heparin   Injectable 5000 Unit(s) SubCutaneous every 12 hours  metoprolol succinate ER 25 milliGRAM(s) Oral daily  pantoprazole    Tablet 40 milliGRAM(s) Oral before breakfast  sodium chloride 0.45%. 1000 milliLiter(s) (45 mL/Hr) IV Continuous <Continuous>  tamsulosin 0.4 milliGRAM(s) Oral at bedtime    MEDICATIONS  (PRN):  acetaminophen     Tablet .. 650 milliGRAM(s) Oral every 6 hours PRN Temp greater or equal to 38C (100.4F), Mild Pain (1 - 3)  aluminum hydroxide/magnesium hydroxide/simethicone Suspension 30 milliLiter(s) Oral every 4 hours PRN Dyspepsia  melatonin 3 milliGRAM(s) Oral at bedtime PRN Insomnia  ondansetron Injectable 4 milliGRAM(s) IV Push every 8 hours PRN Nausea and/or Vomiting      Vital Signs Last 24 Hrs  T(C): 37 (02 Aug 2024 11:51), Max: 37 (02 Aug 2024 11:51)  T(F): 98.6 (02 Aug 2024 11:51), Max: 98.6 (02 Aug 2024 11:51)  HR: 58 (02 Aug 2024 11:51) (58 - 72)  BP: 107/60 (02 Aug 2024 11:51) (107/60 - 134/61)  BP(mean): --  RR: 18 (02 Aug 2024 11:51) (18 - 18)  SpO2: 92% (02 Aug 2024 11:51) (92% - 95%)    Parameters below as of 02 Aug 2024 11:51  Patient On (Oxygen Delivery Method): room air        I&O's Summary    01 Aug 2024 07:01  -  02 Aug 2024 07:00  --------------------------------------------------------  IN: 635 mL / OUT: 1700 mL / NET: -1065 mL        PHYSICAL EXAM:  HEENT: NC/AT; PERRLA  Neck: Soft; no tenderness  Lungs: CTA bilaterally; no wheezing.   Heart:  Abdomen:  Genital/ Rectal:  Extremities:  Neurologic:  Vascular:      LABORATORY:    CBC Full  -  ( 02 Aug 2024 06:45 )  WBC Count : 11.78 K/uL  RBC Count : 2.88 M/uL  Hemoglobin : 9.6 g/dL  Hematocrit : 30.0 %  Platelet Count - Automated : 166 K/uL  Mean Cell Volume : 104.2 fl  Mean Cell Hemoglobin : 33.3 pg  Mean Cell Hemoglobin Concentration : 32.0 gm/dL  Auto Neutrophil # : 9.23 K/uL  Auto Lymphocyte # : 1.16 K/uL  Auto Monocyte # : 1.27 K/uL  Auto Eosinophil # : 0.05 K/uL  Auto Basophil # : 0.02 K/uL  Auto Neutrophil % : 78.4 %  Auto Lymphocyte % : 9.8 %  Auto Monocyte % : 10.8 %  Auto Eosinophil % : 0.4 %  Auto Basophil % : 0.2 %          08-02    142  |  111<H>  |  20  ----------------------------<  91  3.8   |  28  |  0.57    Ca    8.6      02 Aug 2024 06:45  Mg     2.1     08-01    TPro  5.5<L>  /  Alb  2.3<L>  /  TBili  0.6  /  DBili  x   /  AST  16  /  ALT  22  /  AlkPhos  51  08-02          Assessment and Plan:          Alexys Steven MD   [phone].    He is awake  Comfortable  Feeling better      MEDICATIONS  (STANDING):  aspirin enteric coated 81 milliGRAM(s) Oral daily  atorvastatin 10 milliGRAM(s) Oral at bedtime  cefTRIAXone   IVPB 1000 milliGRAM(s) IV Intermittent every 24 hours  escitalopram 5 milliGRAM(s) Oral daily  heparin   Injectable 5000 Unit(s) SubCutaneous every 12 hours  metoprolol succinate ER 25 milliGRAM(s) Oral daily  pantoprazole    Tablet 40 milliGRAM(s) Oral before breakfast  sodium chloride 0.45%. 1000 milliLiter(s) (45 mL/Hr) IV Continuous <Continuous>  tamsulosin 0.4 milliGRAM(s) Oral at bedtime    MEDICATIONS  (PRN):  acetaminophen     Tablet .. 650 milliGRAM(s) Oral every 6 hours PRN Temp greater or equal to 38C (100.4F), Mild Pain (1 - 3)  aluminum hydroxide/magnesium hydroxide/simethicone Suspension 30 milliLiter(s) Oral every 4 hours PRN Dyspepsia  melatonin 3 milliGRAM(s) Oral at bedtime PRN Insomnia  ondansetron Injectable 4 milliGRAM(s) IV Push every 8 hours PRN Nausea and/or Vomiting      Vital Signs Last 24 Hrs  T(C): 37 (02 Aug 2024 11:51), Max: 37 (02 Aug 2024 11:51)  T(F): 98.6 (02 Aug 2024 11:51), Max: 98.6 (02 Aug 2024 11:51)  HR: 58 (02 Aug 2024 11:51) (58 - 72)  BP: 107/60 (02 Aug 2024 11:51) (107/60 - 134/61)  BP(mean): --  RR: 18 (02 Aug 2024 11:51) (18 - 18)  SpO2: 92% (02 Aug 2024 11:51) (92% - 95%)    Parameters below as of 02 Aug 2024 11:51  Patient On (Oxygen Delivery Method): room air        I&O's Summary    01 Aug 2024 07:01  -  02 Aug 2024 07:00  --------------------------------------------------------  IN: 635 mL / OUT: 1700 mL / NET: -1065 mL        PHYSICAL EXAM:  HEENT: NC/AT; PERRLA  Neck: Soft; no tenderness  Lungs: CTA bilaterally; no wheezing.   Heart:  Abdomen:  Genital/ Rectal:  Extremities:  Neurologic:  Vascular:      LABORATORY:    CBC Full  -  ( 02 Aug 2024 06:45 )  WBC Count : 11.78 K/uL  RBC Count : 2.88 M/uL  Hemoglobin : 9.6 g/dL  Hematocrit : 30.0 %  Platelet Count - Automated : 166 K/uL  Mean Cell Volume : 104.2 fl  Mean Cell Hemoglobin : 33.3 pg  Mean Cell Hemoglobin Concentration : 32.0 gm/dL  Auto Neutrophil # : 9.23 K/uL  Auto Lymphocyte # : 1.16 K/uL  Auto Monocyte # : 1.27 K/uL  Auto Eosinophil # : 0.05 K/uL  Auto Basophil # : 0.02 K/uL  Auto Neutrophil % : 78.4 %  Auto Lymphocyte % : 9.8 %  Auto Monocyte % : 10.8 %  Auto Eosinophil % : 0.4 %  Auto Basophil % : 0.2 %          08-02    142  |  111<H>  |  20  ----------------------------<  91  3.8   |  28  |  0.57    Ca    8.6      02 Aug 2024 06:45  Mg     2.1     08-01    TPro  5.5<L>  /  Alb  2.3<L>  /  TBili  0.6  /  DBili  x   /  AST  16  /  ALT  22  /  AlkPhos  51  08-02          Assessment and Plan:    1. Sepsis due to UTI.  2. Bacteremia with gram negative rods.  3. Chronic urinary retention with indwelling farris  4. Weakness.      . Repeat blood culture  , Continue IV Rocephin for now  . Supportive care,        Alexys Steven MD   [phone].    He is awake  Comfortable  Feeling better      MEDICATIONS  (STANDING):  aspirin enteric coated 81 milliGRAM(s) Oral daily  atorvastatin 10 milliGRAM(s) Oral at bedtime  cefTRIAXone   IVPB 1000 milliGRAM(s) IV Intermittent every 24 hours  escitalopram 5 milliGRAM(s) Oral daily  heparin   Injectable 5000 Unit(s) SubCutaneous every 12 hours  metoprolol succinate ER 25 milliGRAM(s) Oral daily  pantoprazole    Tablet 40 milliGRAM(s) Oral before breakfast  sodium chloride 0.45%. 1000 milliLiter(s) (45 mL/Hr) IV Continuous <Continuous>  tamsulosin 0.4 milliGRAM(s) Oral at bedtime    MEDICATIONS  (PRN):  acetaminophen     Tablet .. 650 milliGRAM(s) Oral every 6 hours PRN Temp greater or equal to 38C (100.4F), Mild Pain (1 - 3)  aluminum hydroxide/magnesium hydroxide/simethicone Suspension 30 milliLiter(s) Oral every 4 hours PRN Dyspepsia  melatonin 3 milliGRAM(s) Oral at bedtime PRN Insomnia  ondansetron Injectable 4 milliGRAM(s) IV Push every 8 hours PRN Nausea and/or Vomiting      Vital Signs Last 24 Hrs  T(C): 37 (02 Aug 2024 11:51), Max: 37 (02 Aug 2024 11:51)  T(F): 98.6 (02 Aug 2024 11:51), Max: 98.6 (02 Aug 2024 11:51)  HR: 58 (02 Aug 2024 11:51) (58 - 72)  BP: 107/60 (02 Aug 2024 11:51) (107/60 - 134/61)  BP(mean): --  RR: 18 (02 Aug 2024 11:51) (18 - 18)  SpO2: 92% (02 Aug 2024 11:51) (92% - 95%)    Parameters below as of 02 Aug 2024 11:51  Patient On (Oxygen Delivery Method): room air        I&O's Summary    01 Aug 2024 07:01  -  02 Aug 2024 07:00  --------------------------------------------------------  IN: 635 mL / OUT: 1700 mL / NET: -1065 mL        PHYSICAL EXAM:  HEENT: NC/AT; PERRLA  Neck: Soft; no tenderness  Lungs: Coarse BS bilaterally; no wheezing.   Heart: RRR, no murmurs.   Abdomen: Soft, no tenderness.   Genital/ Rectal: No farris catheter.   Extremities: No ulcers.   Neurologic: Awake.       LABORATORY:    CBC Full  -  ( 02 Aug 2024 06:45 )  WBC Count : 11.78 K/uL  RBC Count : 2.88 M/uL  Hemoglobin : 9.6 g/dL  Hematocrit : 30.0 %  Platelet Count - Automated : 166 K/uL  Mean Cell Volume : 104.2 fl  Mean Cell Hemoglobin : 33.3 pg  Mean Cell Hemoglobin Concentration : 32.0 gm/dL  Auto Neutrophil # : 9.23 K/uL  Auto Lymphocyte # : 1.16 K/uL  Auto Monocyte # : 1.27 K/uL  Auto Eosinophil # : 0.05 K/uL  Auto Basophil # : 0.02 K/uL  Auto Neutrophil % : 78.4 %  Auto Lymphocyte % : 9.8 %  Auto Monocyte % : 10.8 %  Auto Eosinophil % : 0.4 %  Auto Basophil % : 0.2 %          08-02    142  |  111<H>  |  20  ----------------------------<  91  3.8   |  28  |  0.57    Ca    8.6      02 Aug 2024 06:45  Mg     2.1     08-01    TPro  5.5<L>  /  Alb  2.3<L>  /  TBili  0.6  /  DBili  x   /  AST  16  /  ALT  22  /  AlkPhos  51  08-02          Assessment and Plan:    1. Sepsis due to UTI.  2. Bacteremia with gram negative rods.  3. Chronic urinary retention with indwelling farris  4. Weakness.      . The patient is improving.   , Continue IV Rocephin for now. Waiting for reports of blood and urine cultures.   . Supportive care,        Alexys Steven MD   [phone].

## 2024-08-02 NOTE — PHARMACOTHERAPY INTERVENTION NOTE - COMMENTS
Med rec completed by pharmacy representative. Discussed w/ Dr. Nixon who will review and update as clinically appropriate

## 2024-08-02 NOTE — SWALLOW BEDSIDE ASSESSMENT ADULT - SWALLOW EVAL: DIAGNOSIS
1. Functional oral phase for puree, mildly-thick and thin liquids marked by adequate acceptance and containment, adequate bolus manipulation, adequate oral transit and adequate oral clearance. 2.  Mild oral dysphagia for regular solids marked by mildly reduced mastication, slow oral transit and adequate oral clearance. 3. Functional pharyngeal phase for puree, regular solids and mildly-thick liquids marked by hyolaryngeal excursion present upon palpation and no overt s/s of penetration/aspiration evidenced. 4. Moderate pharyngeal dysphagia for thin liquids marked by hyolaryngeal excursion present upon palpation and presence of overt s/s of reduced airway protection evidenced by cough.

## 2024-08-02 NOTE — PROGRESS NOTE ADULT - SUBJECTIVE AND OBJECTIVE BOX
Patient is a 89y Male with a known history of :  Malfunction of Wright catheter [T83.011A]    Acute UTI [N39.0]    Bacteremia [R78.81]    Personal history of asbestosis [Z87.09]    Prophylactic measure [Z29.9]    CAD (coronary artery disease) [I25.10]    Abnormal CT scan, esophagus [R93.3]    Elevated lactic acid level [R79.89]    Elevated WBCs [D72.829]    Folate deficiency [E53.8]      HPI:  89-year-old male brought in by ambulance from [facility] for urine leaking around Wright as well as nausea and vomiting.  Patient states he started not feeling well yesterday, patient nauseous, several episodes of vomiting and diarrhea.  Associated with abdominal pain.  Denies fever, chills, chest pain, shortness of breath, cough, upper or lower extremity weakness or paresthesias.  Patient with chronic indwelling Wright. Found to have UTI and bacteremia Admitted for septic workup and evaluation ,send blood and urine cx ,serial lactate levels ,monitor vitals closely hydration ,monitor urine output and renal profile ,iv abx initiated Palliative care consult requested ,to discuss advance directives and complete MOLST  (01 Aug 2024 08:11)      REVIEW OF SYSTEMS:    CONSTITUTIONAL: No fever, weight loss, or fatigue  EYES: No eye pain, visual disturbances, or discharge  ENMT:  No difficulty hearing, tinnitus, vertigo; No sinus or throat pain  NECK: No pain or stiffness  BREASTS: No pain, masses, or nipple discharge  RESPIRATORY: No cough, wheezing, chills or hemoptysis; No shortness of breath  CARDIOVASCULAR: No chest pain, palpitations, dizziness, or leg swelling  GASTROINTESTINAL: No abdominal or epigastric pain. No nausea, vomiting, or hematemesis; No diarrhea or constipation. No melena or hematochezia.  GENITOURINARY: No dysuria, frequency, hematuria, or incontinence  NEUROLOGICAL: No headaches, memory loss, loss of strength, numbness, or tremors  SKIN: No itching, burning, rashes, or lesions   LYMPH NODES: No enlarged glands  ENDOCRINE: No heat or cold intolerance; No hair loss  MUSCULOSKELETAL: No joint pain or swelling; No muscle, back, or extremity pain  PSYCHIATRIC: No depression, anxiety, mood swings, or difficulty sleeping  HEME/LYMPH: No easy bruising, or bleeding gums  ALLERGY AND IMMUNOLOGIC: No hives or eczema    MEDICATIONS  (STANDING):  aspirin enteric coated 81 milliGRAM(s) Oral daily  atorvastatin 10 milliGRAM(s) Oral at bedtime  cefTRIAXone   IVPB 1000 milliGRAM(s) IV Intermittent every 24 hours  escitalopram 5 milliGRAM(s) Oral daily  heparin   Injectable 5000 Unit(s) SubCutaneous every 12 hours  metoprolol succinate ER 25 milliGRAM(s) Oral daily  pantoprazole    Tablet 40 milliGRAM(s) Oral before breakfast  sodium chloride 0.45%. 1000 milliLiter(s) (45 mL/Hr) IV Continuous <Continuous>  tamsulosin 0.4 milliGRAM(s) Oral at bedtime    MEDICATIONS  (PRN):  acetaminophen     Tablet .. 650 milliGRAM(s) Oral every 6 hours PRN Temp greater or equal to 38C (100.4F), Mild Pain (1 - 3)  aluminum hydroxide/magnesium hydroxide/simethicone Suspension 30 milliLiter(s) Oral every 4 hours PRN Dyspepsia  melatonin 3 milliGRAM(s) Oral at bedtime PRN Insomnia  ondansetron Injectable 4 milliGRAM(s) IV Push every 8 hours PRN Nausea and/or Vomiting      ALLERGIES: No Known Allergies      FAMILY HISTORY:  No pertinent family history in first degree relatives        PHYSICAL EXAMINATION:  -----------------------------  T(C): 36.4 (08-02-24 @ 04:43), Max: 36.6 (08-01-24 @ 20:11)  HR: 67 (08-02-24 @ 04:43) (67 - 88)  BP: 134/61 (08-02-24 @ 04:43) (124/64 - 134/61)  RR: 18 (08-02-24 @ 04:43) (18 - 18)  SpO2: 95% (08-02-24 @ 04:43) (92% - 95%)  Wt(kg): --    08-01 @ 07:01  -  08-02 @ 07:00  --------------------------------------------------------  IN:    IV PiggyBack: 50 mL    sodium chloride 0.45%: 585 mL  Total IN: 635 mL    OUT:    Indwelling Catheter - Urethral (mL): 1700 mL  Total OUT: 1700 mL    Total NET: -1065 mL            VITALS  T(C): 36.4 (08-02-24 @ 04:43), Max: 36.6 (08-01-24 @ 20:11)  HR: 67 (08-02-24 @ 04:43) (67 - 88)  BP: 134/61 (08-02-24 @ 04:43) (124/64 - 134/61)  RR: 18 (08-02-24 @ 04:43) (18 - 18)  SpO2: 95% (08-02-24 @ 04:43) (92% - 95%)    Constitutional: well developed, normal appearance, well groomed, well nourished, no deformities and no acute distress.   Eyes: the conjunctiva exhibited no abnormalities and the eyelids demonstrated no xanthelasmas.   HEENT: normal oral mucosa, no oral pallor and no oral cyanosis.   Neck: normal jugular venous A waves present, normal jugular venous V waves present and no jugular venous velazquez A waves.   Pulmonary: no respiratory distress, normal respiratory rhythm and effort, no accessory muscle use and lungs were clear to auscultation bilaterally.   Cardiovascular: heart rate and rhythm were normal, normal S1 and S2 and no murmur, gallop, rub, heave or thrill are present.   Abdomen: soft, non-tender, no hepato-splenomegaly and no abdominal mass palpated.   Musculoskeletal: the gait could not be assessed..   Extremities: no clubbing of the fingernails, no localized cyanosis, no petechial hemorrhages and no ischemic changes.   Skin: normal skin color and pigmentation, no rash, no venous stasis, no skin lesions, no skin ulcer and no xanthoma was observed.   Psychiatric: oriented to person, place, and time, the affect was normal, the mood was normal and not feeling anxious.     LABS:   --------  08-02    142  |  111<H>  |  20  ----------------------------<  91  3.8   |  28  |  0.57    Ca    8.6      02 Aug 2024 06:45  Mg     2.1     08-01    TPro  5.5<L>  /  Alb  2.3<L>  /  TBili  0.6  /  DBili  x   /  AST  16  /  ALT  22  /  AlkPhos  51  08-02                         9.6    11.78 )-----------( 166      ( 02 Aug 2024 06:45 )             30.0             Culture Results:   Growth in aerobic bottle: Gram Negative Rods  Direct identification is available within approximately 3-5  hours either by Blood Panel Multiplexed PCR or Direct  MALDI-TOF. Details: [email or web address] *!* (07-31 @ 21:00)  Culture Results:   >100,000 CFU/ml Gram Negative Rods *!* (07-31 @ 21:00)  Culture Results:   No growth at 24 hours (07-31 @ 20:50)      RADIOLOGY:  -----------------    ECG:     ECHO:

## 2024-08-02 NOTE — SWALLOW BEDSIDE ASSESSMENT ADULT - ASR SWALLOW RECOMMEND DIAG
Objective testing is NOT indicated given functional swallow for recommended diet and presence of overt s/s of reduced airway protection with thin liquids

## 2024-08-02 NOTE — PROGRESS NOTE ADULT - SUBJECTIVE AND OBJECTIVE BOX
PROGRESS NOTE  Patient is a 89y old  Male who presents with a chief complaint of leakage around Wright catheter (02 Aug 2024 11:27)    Chart and available morning labs /imaging are reviewed electronically , urgent issues addressed . More information  is being added upon completion of rounds , when more information is collected and management discussed with consultants , medical staff and social service/case management on the floor   OVERNIGHT    No new issues reported by medical staff . All above noted Patient is resting in a bed comfortably .Confused ,poor mentation .No distress noted   HPI:  89-year-old male brought in by ambulance from [facility] for urine leaking around Wright as well as nausea and vomiting.  Patient states he started not feeling well yesterday, patient nauseous, several episodes of vomiting and diarrhea.  Associated with abdominal pain.  Denies fever, chills, chest pain, shortness of breath, cough, upper or lower extremity weakness or paresthesias.  Patient with chronic indwelling Wright. Found to have UTI and bacteremia Admitted for septic workup and evaluation ,send blood and urine cx ,serial lactate levels ,monitor vitals closely hydration ,monitor urine output and renal profile ,iv abx initiated Palliative care consult requested ,to discuss advance directives and complete MOLST  (01 Aug 2024 08:11)    PAST MEDICAL & SURGICAL HISTORY:  CAD (coronary artery disease)      HTN (hypertension)      HLD (hyperlipidemia)      DJD (degenerative joint disease)      Anemia of chronic disease      Enlarged prostate      H/O osteopenia      Urinary retention      Personal history of asbestosis      Hiatal hernia      Hiatal hernia with GERD and esophagitis      Indwelling urinary catheter present      S/P CABG x 4      History of cholecystectomy      H/O kyphoplasty      History of hip surgery      History of appendectomy          MEDICATIONS  (STANDING):  aspirin enteric coated 81 milliGRAM(s) Oral daily  atorvastatin 10 milliGRAM(s) Oral at bedtime  cefTRIAXone   IVPB 1000 milliGRAM(s) IV Intermittent every 24 hours  escitalopram 5 milliGRAM(s) Oral daily  heparin   Injectable 5000 Unit(s) SubCutaneous every 12 hours  metoprolol succinate ER 25 milliGRAM(s) Oral daily  pantoprazole    Tablet 40 milliGRAM(s) Oral before breakfast  sodium chloride 0.45%. 1000 milliLiter(s) (45 mL/Hr) IV Continuous <Continuous>  tamsulosin 0.4 milliGRAM(s) Oral at bedtime    MEDICATIONS  (PRN):  acetaminophen     Tablet .. 650 milliGRAM(s) Oral every 6 hours PRN Temp greater or equal to 38C (100.4F), Mild Pain (1 - 3)  aluminum hydroxide/magnesium hydroxide/simethicone Suspension 30 milliLiter(s) Oral every 4 hours PRN Dyspepsia  melatonin 3 milliGRAM(s) Oral at bedtime PRN Insomnia  ondansetron Injectable 4 milliGRAM(s) IV Push every 8 hours PRN Nausea and/or Vomiting      OBJECTIVE    T(C): 37 (08-02-24 @ 11:51), Max: 37 (08-02-24 @ 11:51)  HR: 58 (08-02-24 @ 11:51) (58 - 72)  BP: 107/60 (08-02-24 @ 11:51) (107/60 - 134/61)  RR: 18 (08-02-24 @ 11:51) (18 - 18)  SpO2: 92% (08-02-24 @ 11:51) (92% - 95%)  Wt(kg): --  I&O's Summary    01 Aug 2024 07:01  -  02 Aug 2024 07:00  --------------------------------------------------------  IN: 635 mL / OUT: 1700 mL / NET: -1065 mL          REVIEW OF SYSTEMS:  CONSTITUTIONAL: No fever, weight loss, or fatigue  EYES: No eye pain, visual disturbances, or discharge  ENMT:   No sinus or throat pain  NECK: No pain or stiffness  RESPIRATORY: No cough, wheezing, chills or hemoptysis; No shortness of breath  CARDIOVASCULAR: No chest pain, palpitations, dizziness, or leg swelling  GASTROINTESTINAL: No abdominal pain. No nausea, vomiting; No diarrhea or constipation. No melena or hematochezia.  GENITOURINARY: No dysuria, frequency, hematuria, or incontinence  NEUROLOGICAL: No headaches, memory loss, loss of strength, numbness, or tremors  SKIN: No itching, burning, rashes, or lesions   MUSCULOSKELETAL: No joint pain or swelling; No muscle, back, or extremity pain    PHYSICAL EXAM:  Appearance: NAD. VS past 24 hrs -as above   HEENT:   Moist oral mucosa. Conjunctiva clear b/l.   Neck : supple  Respiratory: Lungs CTAB.  Gastrointestinal:  Soft, nontender. No rebound. No rigidity. BS present	  Cardiovascular: RRR ,S1S2 present  Neurologic: Non-focal. Moving all extremities.  Extremities: No edema. No erythema. No calf tenderness.  Skin: No rashes, No ecchymoses, No cyanosis.	  wounds ,skin lesions-See skin assesment flow sheet   LABS:                        9.6    11.78 )-----------( 166      ( 02 Aug 2024 06:45 )             30.0     08-02    142  |  111<H>  |  20  ----------------------------<  91  3.8   |  28  |  0.57    Ca    8.6      02 Aug 2024 06:45  Mg     2.1     08-01    TPro  5.5<L>  /  Alb  2.3<L>  /  TBili  0.6  /  DBili  x   /  AST  16  /  ALT  22  /  AlkPhos  51  08-02    CAPILLARY BLOOD GLUCOSE          Urinalysis Basic - ( 02 Aug 2024 06:45 )    Color: x / Appearance: x / SG: x / pH: x  Gluc: 91 mg/dL / Ketone: x  / Bili: x / Urobili: x   Blood: x / Protein: x / Nitrite: x   Leuk Esterase: x / RBC: x / WBC x   Sq Epi: x / Non Sq Epi: x / Bacteria: x        Culture - Blood (collected 31 Jul 2024 21:00)  Source: .Blood Blood-Peripheral  Gram Stain (01 Aug 2024 17:06):    Growth in aerobic bottle: Gram Negative Rods  Preliminary Report (02 Aug 2024 10:14):    Growth in aerobic bottle: Proteus mirabilis    Direct identification is available within approximately 3-5    hours either by Blood Panel Multiplexed PCR or Direct    MALDI-TOF. Details: [email or web address]  Organism: Blood Culture PCR (01 Aug 2024 19:36)  Organism: Blood Culture PCR (01 Aug 2024 19:36)    Culture - Urine (collected 31 Jul 2024 21:00)  Source: Clean Catch Clean Catch (Midstream)  Preliminary Report (02 Aug 2024 08:20):    >100,000 CFU/ml Gram Negative Rods    Culture - Blood (collected 31 Jul 2024 20:50)  Source: .Blood Blood-Peripheral  Preliminary Report (02 Aug 2024 02:02):    No growth at 24 hours      RADIOLOGY & ADDITIONAL TESTS:   reviewed elctronically  ASSESSMENT/PLAN: 	    25 minutes aggregate time was spent on this visit, 50% visit time spent in care co-ordination with other attendings and counselling patient .I have discussed care plan with patient / HCP/family member ,who expressed understanding of problems treatment and their effect and side effects, alternatives in details. I have asked if they have any questions and concerns and appropriately addressed them to best of my ability.

## 2024-08-02 NOTE — SWALLOW BEDSIDE ASSESSMENT ADULT - COMMENTS
As per H&P dated 8/1/24 "89-year-old male brought in by ambulance from [facility] for urine leaking around Wright as well as nausea and vomiting.  Patient states he started not feeling well yesterday, patient nauseous, several episodes of vomiting and diarrhea.  Associated with abdominal pain.  Denies fever, chills, chest pain, shortness of breath, cough, upper or lower extremity weakness or paresthesias.  Patient with chronic indwelling Wright. 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 Please correlate for signs and symptoms. No focal infiltrate appreciated"    Patient visited at bedside for clinical swallow evaluation. Patient presents as awake and alert, able to follow 1-step directives and make basic wants/needs known. Patient denies difficulty swallowing.

## 2024-08-03 LAB
-  AMPICILLIN/SULBACTAM: SIGNIFICANT CHANGE UP
-  AMPICILLIN: SIGNIFICANT CHANGE UP
-  AZTREONAM: SIGNIFICANT CHANGE UP
-  CEFAZOLIN: SIGNIFICANT CHANGE UP
-  CEFEPIME: SIGNIFICANT CHANGE UP
-  CEFOXITIN: SIGNIFICANT CHANGE UP
-  CEFTRIAXONE: SIGNIFICANT CHANGE UP
-  CIPROFLOXACIN: SIGNIFICANT CHANGE UP
-  ERTAPENEM: SIGNIFICANT CHANGE UP
-  GENTAMICIN: SIGNIFICANT CHANGE UP
-  LEVOFLOXACIN: SIGNIFICANT CHANGE UP
-  MEROPENEM: SIGNIFICANT CHANGE UP
-  PIPERACILLIN/TAZOBACTAM: SIGNIFICANT CHANGE UP
-  TOBRAMYCIN: SIGNIFICANT CHANGE UP
-  TRIMETHOPRIM/SULFAMETHOXAZOLE: SIGNIFICANT CHANGE UP
ALBUMIN, RANDOM URINE: 15.5 MG/DL — SIGNIFICANT CHANGE UP
ALBUMIN/CREATININE RATIO (ACR): 144 MG/G — HIGH (ref 0–30)
ANION GAP SERPL CALC-SCNC: 6 MMOL/L — SIGNIFICANT CHANGE UP (ref 5–17)
BUN SERPL-MCNC: 17 MG/DL — SIGNIFICANT CHANGE UP (ref 7–23)
CALCIUM SERPL-MCNC: 8.3 MG/DL — LOW (ref 8.5–10.1)
CHLORIDE SERPL-SCNC: 107 MMOL/L — SIGNIFICANT CHANGE UP (ref 96–108)
CO2 SERPL-SCNC: 29 MMOL/L — SIGNIFICANT CHANGE UP (ref 22–31)
CREAT ?TM UR-MCNC: 108 MG/DL — SIGNIFICANT CHANGE UP
CREAT SERPL-MCNC: 0.57 MG/DL — SIGNIFICANT CHANGE UP (ref 0.5–1.3)
CULTURE RESULTS: ABNORMAL
EGFR: 93 ML/MIN/1.73M2 — SIGNIFICANT CHANGE UP
GLUCOSE SERPL-MCNC: 88 MG/DL — SIGNIFICANT CHANGE UP (ref 70–99)
HCT VFR BLD CALC: 27.8 % — LOW (ref 39–50)
HGB BLD-MCNC: 9 G/DL — LOW (ref 13–17)
MCHC RBC-ENTMCNC: 32.4 GM/DL — SIGNIFICANT CHANGE UP (ref 32–36)
MCHC RBC-ENTMCNC: 33.3 PG — SIGNIFICANT CHANGE UP (ref 27–34)
MCV RBC AUTO: 103 FL — HIGH (ref 80–100)
METHOD TYPE: SIGNIFICANT CHANGE UP
NRBC # BLD: 0 /100 WBCS — SIGNIFICANT CHANGE UP (ref 0–0)
ORGANISM # SPEC MICROSCOPIC CNT: ABNORMAL
ORGANISM # SPEC MICROSCOPIC CNT: ABNORMAL
ORGANISM # SPEC MICROSCOPIC CNT: SIGNIFICANT CHANGE UP
PLATELET # BLD AUTO: 149 K/UL — LOW (ref 150–400)
POTASSIUM SERPL-MCNC: 3.7 MMOL/L — SIGNIFICANT CHANGE UP (ref 3.5–5.3)
POTASSIUM SERPL-SCNC: 3.7 MMOL/L — SIGNIFICANT CHANGE UP (ref 3.5–5.3)
RBC # BLD: 2.7 M/UL — LOW (ref 4.2–5.8)
RBC # FLD: 15.8 % — HIGH (ref 10.3–14.5)
SODIUM SERPL-SCNC: 142 MMOL/L — SIGNIFICANT CHANGE UP (ref 135–145)
SPECIMEN SOURCE: SIGNIFICANT CHANGE UP
WBC # BLD: 6.5 K/UL — SIGNIFICANT CHANGE UP (ref 3.8–10.5)
WBC # FLD AUTO: 6.5 K/UL — SIGNIFICANT CHANGE UP (ref 3.8–10.5)

## 2024-08-03 PROCEDURE — 99233 SBSQ HOSP IP/OBS HIGH 50: CPT

## 2024-08-03 RX ADMIN — Medication 81 MILLIGRAM(S): at 12:13

## 2024-08-03 RX ADMIN — DEXTROSE MONOHYDRATE, SODIUM CHLORIDE, SODIUM LACTATE, CALCIUM CHLORIDE, MAGNESIUM CHLORIDE 45 MILLILITER(S): 1.5; 538; 448; 18.4; 5.08 SOLUTION INTRAPERITONEAL at 13:08

## 2024-08-03 RX ADMIN — DEXTROSE MONOHYDRATE, SODIUM CHLORIDE, SODIUM LACTATE, CALCIUM CHLORIDE, MAGNESIUM CHLORIDE 45 MILLILITER(S): 1.5; 538; 448; 18.4; 5.08 SOLUTION INTRAPERITONEAL at 22:03

## 2024-08-03 RX ADMIN — Medication 100 MILLIGRAM(S): at 22:03

## 2024-08-03 RX ADMIN — ATORVASTATIN CALCIUM 10 MILLIGRAM(S): 40 TABLET, FILM COATED ORAL at 22:03

## 2024-08-03 RX ADMIN — PANTOPRAZOLE SODIUM 40 MILLIGRAM(S): 20 TABLET, DELAYED RELEASE ORAL at 05:44

## 2024-08-03 RX ADMIN — HEPARIN SODIUM 5000 UNIT(S): 1000 INJECTION, SOLUTION INTRAVENOUS; SUBCUTANEOUS at 05:40

## 2024-08-03 RX ADMIN — Medication 25 MILLIGRAM(S): at 05:40

## 2024-08-03 RX ADMIN — Medication 0.4 MILLIGRAM(S): at 22:03

## 2024-08-03 RX ADMIN — HEPARIN SODIUM 5000 UNIT(S): 1000 INJECTION, SOLUTION INTRAVENOUS; SUBCUTANEOUS at 17:24

## 2024-08-03 RX ADMIN — Medication 5 MILLIGRAM(S): at 12:13

## 2024-08-03 NOTE — PROGRESS NOTE ADULT - SUBJECTIVE AND OBJECTIVE BOX
CHIEF COMPLAINT/ REASON FOR VISIT  .. Patient was seen to address the  issue listed under PROBLEM LIST which is located toward bottom of this note     CARISSA CUNNINGHAM    [address]    Allergies    No Known Allergies    Intolerances        PAST MEDICAL & SURGICAL HISTORY:  CAD (coronary artery disease)      HTN (hypertension)      HLD (hyperlipidemia)      DJD (degenerative joint disease)      Anemia of chronic disease      Enlarged prostate      H/O osteopenia      Urinary retention      Personal history of asbestosis      Hiatal hernia      Hiatal hernia with GERD and esophagitis      Indwelling urinary catheter present      S/P CABG x 4      History of cholecystectomy      H/O kyphoplasty      History of hip surgery      History of appendectomy          FAMILY HISTORY:  No pertinent family history in first degree relatives        Home Medications:  ascorbic acid 500 mg oral tablet: 2 tab(s) orally once a day (01 Aug 2024 11:04)  aspirin 81 mg oral delayed release tablet: 1 tab(s) orally once a day (01 Aug 2024 11:04)  atorvastatin 20 mg oral tablet: 1 tab(s) orally once a day (at bedtime) (01 Aug 2024 11:04)  cyanocobalamin 1000 mcg oral tablet: 1 tab(s) orally once a day (01 Aug 2024 11:04)  Dulcolax Laxative 10 mg rectal suppository: 1 suppository(ies) rectally as needed for  constipation (01 Aug 2024 11:04)  escitalopram 10 mg oral tablet: 1 tab(s) orally once a day (01 Aug 2024 11:04)  ferrous sulfate 325 mg (65 mg elemental iron) oral tablet: 1 tab(s) orally once a day (01 Aug 2024 11:04)  finasteride 5 mg oral tablet: 1 tab(s) orally once a day (01 Aug 2024 11:04)  Fleet Enema 19 g-7 g rectal enema: 133 milliliter(s) rectally as needed for  constipation (01 Aug 2024 11:04)  melatonin 5 mg oral tablet: 1 tab(s) orally once a day (01 Aug 2024 11:04)  Metoprolol Succinate ER 25 mg oral tablet, extended release: 1 tab(s) orally once a day (01 Aug 2024 11:04)  Milk of Magnesia 1200 mg/15 mL oral liquid: 30 milliliter(s) orally once a day as needed for  constipation (01 Aug 2024 11:04)  Multiple Vitamins with Minerals oral tablet: 1 tab(s) orally once a day (01 Aug 2024 11:04)  ondansetron 4 mg oral tablet: 1 tab(s) orally every 8 hours (01 Aug 2024 11:03)  tamsulosin 0.4 mg oral capsule: 1 cap(s) orally once a day (in the evening) (01 Aug 2024 11:04)      MEDICATIONS  (STANDING):  aspirin enteric coated 81 milliGRAM(s) Oral daily  atorvastatin 10 milliGRAM(s) Oral at bedtime  cefTRIAXone   IVPB 1000 milliGRAM(s) IV Intermittent every 24 hours  escitalopram 5 milliGRAM(s) Oral daily  heparin   Injectable 5000 Unit(s) SubCutaneous every 12 hours  metoprolol succinate ER 25 milliGRAM(s) Oral daily  pantoprazole    Tablet 40 milliGRAM(s) Oral before breakfast  sodium chloride 0.45%. 1000 milliLiter(s) (45 mL/Hr) IV Continuous <Continuous>  tamsulosin 0.4 milliGRAM(s) Oral at bedtime    MEDICATIONS  (PRN):  acetaminophen     Tablet .. 650 milliGRAM(s) Oral every 6 hours PRN Temp greater or equal to 38C (100.4F), Mild Pain (1 - 3)  aluminum hydroxide/magnesium hydroxide/simethicone Suspension 30 milliLiter(s) Oral every 4 hours PRN Dyspepsia  melatonin 3 milliGRAM(s) Oral at bedtime PRN Insomnia  ondansetron Injectable 4 milliGRAM(s) IV Push every 8 hours PRN Nausea and/or Vomiting              Vital Signs Last 24 Hrs  T(C): 36.6 (03 Aug 2024 04:51), Max: 37 (02 Aug 2024 11:51)  T(F): 97.8 (03 Aug 2024 04:51), Max: 98.6 (02 Aug 2024 11:51)  HR: 60 (03 Aug 2024 04:51) (58 - 66)  BP: 114/64 (03 Aug 2024 04:51) (107/60 - 129/58)  BP(mean): --  RR: 18 (03 Aug 2024 04:51) (18 - 18)  SpO2: 92% (03 Aug 2024 04:51) (90% - 92%)    Parameters below as of 03 Aug 2024 04:51  Patient On (Oxygen Delivery Method): room air          08-02-24 @ 07:01  -  08-03-24 @ 07:00  --------------------------------------------------------  IN: 585 mL / OUT: 0 mL / NET: 585 mL              LABS:                        9.0    6.50  )-----------( 149      ( 03 Aug 2024 06:38 )             27.8     08-03    142  |  107  |  17  ----------------------------<  88  3.7   |  29  |  0.57    Ca    8.3<L>      03 Aug 2024 06:38    TPro  5.5<L>  /  Alb  2.3<L>  /  TBili  0.6  /  DBili  x   /  AST  16  /  ALT  22  /  AlkPhos  51  08-02      Urinalysis Basic - ( 03 Aug 2024 06:38 )    Color: x / Appearance: x / SG: x / pH: x  Gluc: 88 mg/dL / Ketone: x  / Bili: x / Urobili: x   Blood: x / Protein: x / Nitrite: x   Leuk Esterase: x / RBC: x / WBC x   Sq Epi: x / Non Sq Epi: x / Bacteria: x            WBC:  WBC Count: 6.50 K/uL (08-03 @ 06:38)  WBC Count: 11.78 K/uL (08-02 @ 06:45)  WBC Count: 19.54 K/uL (08-01 @ 08:33)  WBC Count: 23.00 K/uL (07-31 @ 21:00)      MICROBIOLOGY:  RECENT CULTURES:  08-02 .Blood Blood XXXX XXXX   No growth at 24 hours    08-02 .Blood Blood XXXX XXXX   No growth at 24 hours    07-31 .Blood Blood-Peripheral Blood Culture PCR  Proteus mirabilis   Growth in aerobic bottle: Gram Negative Rods   Growth in aerobic bottle: Proteus mirabilis  Direct identification is available within approximately 3-5  hours either by Blood Panel Multiplexed PCR or Direct  MALDI-TOF. Details: [email or web address]    07-31 .Blood Blood-Peripheral XXXX XXXX   No growth at 48 Hours                    Sodium:  Sodium: 142 mmol/L (08-03 @ 06:38)  Sodium: 142 mmol/L (08-02 @ 06:45)  Sodium: 144 mmol/L (08-01 @ 08:33)  Sodium: 144 mmol/L (07-31 @ 21:00)      0.57 mg/dL 08-03 @ 06:38  0.57 mg/dL 08-02 @ 06:45  0.79 mg/dL 08-01 @ 08:33  1.30 mg/dL 07-31 @ 21:00      Hemoglobin:  Hemoglobin: 9.0 g/dL (08-03 @ 06:38)  Hemoglobin: 9.6 g/dL (08-02 @ 06:45)  Hemoglobin: 11.2 g/dL (08-01 @ 08:33)  Hemoglobin: 12.2 g/dL (07-31 @ 21:00)      Platelets: Platelet Count - Automated: 149 K/uL (08-03 @ 06:38)  Platelet Count - Automated: 166 K/uL (08-02 @ 06:45)  Platelet Count - Automated: 206 K/uL (08-01 @ 08:33)  Platelet Count - Automated: 245 K/uL (07-31 @ 21:00)      LIVER FUNCTIONS - ( 02 Aug 2024 06:45 )  Alb: 2.3 g/dL / Pro: 5.5 g/dL / ALK PHOS: 51 U/L / ALT: 22 U/L / AST: 16 U/L / GGT: x             Urinalysis Basic - ( 03 Aug 2024 06:38 )    Color: x / Appearance: x / SG: x / pH: x  Gluc: 88 mg/dL / Ketone: x  / Bili: x / Urobili: x   Blood: x / Protein: x / Nitrite: x   Leuk Esterase: x / RBC: x / WBC x   Sq Epi: x / Non Sq Epi: x / Bacteria: x        RADIOLOGY & ADDITIONAL STUDIES:      MICROBIOLOGY:  RECENT CULTURES:  08-02 .Blood Blood XXXX XXXX   No growth at 24 hours    08-02 .Blood Blood XXXX XXXX   No growth at 24 hours    07-31 .Blood Blood-Peripheral Blood Culture PCR  Proteus mirabilis   Growth in aerobic bottle: Gram Negative Rods   Growth in aerobic bottle: Proteus mirabilis  Direct identification is available within approximately 3-5  hours either by Blood Panel Multiplexed PCR or Direct  MALDI-TOF. Details: [email or web address]    07-31 .Blood Blood-Peripheral XXXX XXXX   No growth at 48 Hours

## 2024-08-03 NOTE — DIETITIAN INITIAL EVALUATION ADULT - NSICDXPASTSURGICALHX_GEN_ALL_CORE_FT
PAST SURGICAL HISTORY:  H/O kyphoplasty     History of appendectomy     History of cholecystectomy     History of hip surgery     S/P CABG x 4

## 2024-08-03 NOTE — DIETITIAN INITIAL EVALUATION ADULT - PERTINENT LABORATORY DATA
08-03    142  |  107  |  17  ----------------------------<  88  3.7   |  29  |  0.57    Ca    8.3<L>      03 Aug 2024 06:38    TPro  5.5<L>  /  Alb  2.3<L>  /  TBili  0.6  /  DBili  x   /  AST  16  /  ALT  22  /  AlkPhos  51  08-02  A1C with Estimated Average Glucose Result: 5.7 % (08-01-24 @ 08:33)

## 2024-08-03 NOTE — PROGRESS NOTE ADULT - SUBJECTIVE AND OBJECTIVE BOX
Patient is a 89y Male with a known history of :  Malfunction of Wright catheter [T83.011A]    Acute UTI [N39.0]    Bacteremia [R78.81]    Personal history of asbestosis [Z87.09]    Prophylactic measure [Z29.9]    CAD (coronary artery disease) [I25.10]    Abnormal CT scan, esophagus [R93.3]    Elevated lactic acid level [R79.89]    Elevated WBCs [D72.829]    Folate deficiency [E53.8]      HPI:  89-year-old male brought in by ambulance from [facility] for urine leaking around Wright as well as nausea and vomiting.  Patient states he started not feeling well yesterday, patient nauseous, several episodes of vomiting and diarrhea.  Associated with abdominal pain.  Denies fever, chills, chest pain, shortness of breath, cough, upper or lower extremity weakness or paresthesias.  Patient with chronic indwelling Wright. Found to have UTI and bacteremia Admitted for septic workup and evaluation ,send blood and urine cx ,serial lactate levels ,monitor vitals closely hydration ,monitor urine output and renal profile ,iv abx initiated Palliative care consult requested ,to discuss advance directives and complete MOLST  (01 Aug 2024 08:11)      REVIEW OF SYSTEMS:    CONSTITUTIONAL: No fever, weight loss, or fatigue  EYES: No eye pain, visual disturbances, or discharge  ENMT:  No difficulty hearing, tinnitus, vertigo; No sinus or throat pain  NECK: No pain or stiffness  BREASTS: No pain, masses, or nipple discharge  RESPIRATORY: No cough, wheezing, chills or hemoptysis; No shortness of breath  CARDIOVASCULAR: No chest pain, palpitations, dizziness, or leg swelling  GASTROINTESTINAL: No abdominal or epigastric pain. No nausea, vomiting, or hematemesis; No diarrhea or constipation. No melena or hematochezia.  GENITOURINARY: No dysuria, frequency, hematuria, or incontinence  NEUROLOGICAL: No headaches, memory loss, loss of strength, numbness, or tremors  SKIN: No itching, burning, rashes, or lesions   LYMPH NODES: No enlarged glands  ENDOCRINE: No heat or cold intolerance; No hair loss  MUSCULOSKELETAL: No joint pain or swelling; No muscle, back, or extremity pain  PSYCHIATRIC: No depression, anxiety, mood swings, or difficulty sleeping  HEME/LYMPH: No easy bruising, or bleeding gums  ALLERGY AND IMMUNOLOGIC: No hives or eczema    MEDICATIONS  (STANDING):  aspirin enteric coated 81 milliGRAM(s) Oral daily  atorvastatin 10 milliGRAM(s) Oral at bedtime  cefTRIAXone   IVPB 1000 milliGRAM(s) IV Intermittent every 24 hours  escitalopram 5 milliGRAM(s) Oral daily  heparin   Injectable 5000 Unit(s) SubCutaneous every 12 hours  metoprolol succinate ER 25 milliGRAM(s) Oral daily  pantoprazole    Tablet 40 milliGRAM(s) Oral before breakfast  sodium chloride 0.45%. 1000 milliLiter(s) (45 mL/Hr) IV Continuous <Continuous>  tamsulosin 0.4 milliGRAM(s) Oral at bedtime    MEDICATIONS  (PRN):  acetaminophen     Tablet .. 650 milliGRAM(s) Oral every 6 hours PRN Temp greater or equal to 38C (100.4F), Mild Pain (1 - 3)  aluminum hydroxide/magnesium hydroxide/simethicone Suspension 30 milliLiter(s) Oral every 4 hours PRN Dyspepsia  melatonin 3 milliGRAM(s) Oral at bedtime PRN Insomnia  ondansetron Injectable 4 milliGRAM(s) IV Push every 8 hours PRN Nausea and/or Vomiting      ALLERGIES: No Known Allergies      FAMILY HISTORY:  No pertinent family history in first degree relatives        PHYSICAL EXAMINATION:  -----------------------------  T(C): 36.6 (08-03-24 @ 04:51), Max: 37 (08-02-24 @ 11:51)  HR: 60 (08-03-24 @ 04:51) (58 - 66)  BP: 114/64 (08-03-24 @ 04:51) (107/60 - 129/58)  RR: 18 (08-03-24 @ 04:51) (18 - 18)  SpO2: 92% (08-03-24 @ 04:51) (90% - 92%)  Wt(kg): --    08-02 @ 07:01  -  08-03 @ 07:00  --------------------------------------------------------  IN:    sodium chloride 0.45%: 585 mL  Total IN: 585 mL    OUT:  Total OUT: 0 mL    Total NET: 585 mL            VITALS  T(C): 36.6 (08-03-24 @ 04:51), Max: 37 (08-02-24 @ 11:51)  HR: 60 (08-03-24 @ 04:51) (58 - 66)  BP: 114/64 (08-03-24 @ 04:51) (107/60 - 129/58)  RR: 18 (08-03-24 @ 04:51) (18 - 18)  SpO2: 92% (08-03-24 @ 04:51) (90% - 92%)    Constitutional: well developed, normal appearance, well groomed, well nourished, no deformities and no acute distress.   Eyes: the conjunctiva exhibited no abnormalities and the eyelids demonstrated no xanthelasmas.   HEENT: normal oral mucosa, no oral pallor and no oral cyanosis.   Neck: normal jugular venous A waves present, normal jugular venous V waves present and no jugular venous velazquez A waves.   Pulmonary: no respiratory distress, normal respiratory rhythm and effort, no accessory muscle use and lungs were clear to auscultation bilaterally.   Cardiovascular: heart rate and rhythm were normal, normal S1 and S2 and no murmur, gallop, rub, heave or thrill are present.   Abdomen: soft, non-tender, no hepato-splenomegaly and no abdominal mass palpated.   Musculoskeletal: the gait could not be assessed..   Extremities: no clubbing of the fingernails, no localized cyanosis, no petechial hemorrhages and no ischemic changes.   Skin: normal skin color and pigmentation, no rash, no venous stasis, no skin lesions, no skin ulcer and no xanthoma was observed.   Psychiatric: oriented to person, place, and time, the affect was normal, the mood was normal and not feeling anxious.     LABS:   --------  08-02    142  |  111<H>  |  20  ----------------------------<  91  3.8   |  28  |  0.57    Ca    8.6      02 Aug 2024 06:45  Mg     2.1     08-01    TPro  5.5<L>  /  Alb  2.3<L>  /  TBili  0.6  /  DBili  x   /  AST  16  /  ALT  22  /  AlkPhos  51  08-02                         9.6    11.78 )-----------( 166      ( 02 Aug 2024 06:45 )             30.0                 RADIOLOGY:  -----------------    ECG:     ECHO:

## 2024-08-03 NOTE — DIETITIAN INITIAL EVALUATION ADULT - NSICDXPASTMEDICALHX_GEN_ALL_CORE_FT
PAST MEDICAL HISTORY:  Anemia of chronic disease     CAD (coronary artery disease)     DJD (degenerative joint disease)     Enlarged prostate     H/O osteopenia     Hiatal hernia     Hiatal hernia with GERD and esophagitis     HLD (hyperlipidemia)     HTN (hypertension)     Indwelling urinary catheter present     Personal history of asbestosis     Urinary retention

## 2024-08-03 NOTE — DIETITIAN INITIAL EVALUATION ADULT - REASON FOR ADMISSION
Urinary tract infection  per H&P: Reason for Admission: leakage around Wright catheter  History of Present Illness:   89-year-old male brought in by ambulance from [facility] for urine leaking around Wright as well as nausea and vomiting.  Patient states he started not feeling well yesterday, patient nauseous, several episodes of vomiting and diarrhea.  Associated with abdominal pain.  Denies fever, chills, chest pain, shortness of breath, cough, upper or lower extremity weakness or paresthesias.  Patient with chronic indwelling Wright. Found to have UTI and bacteremia Admitted for septic workup and evaluation ,send blood and urine cx ,serial lactate levels ,monitor vitals closely hydration ,monitor urine output and renal profile ,iv abx initiated Palliative care consult requested ,to discuss advance directives and complete MOLST

## 2024-08-03 NOTE — PROGRESS NOTE ADULT - PROBLEM SELECTOR PLAN 2
1. Sepsis due to UTI 2/2 to chronic indwelling farris cath poa .  2. Bacteremia with gram negative rods.  3. Chronic urinary retention with indwelling farris
Repeat blood cx , ID cons informed , patient is on ceftriaxone ,follow cx to completion .Patient was ruled in for sepsis 2/2 to acute uti 2/2 to chronic indwelling Wright catheter , poa - repeat blood cx , ID cons informed , patient is on ceftriaxone

## 2024-08-03 NOTE — PROGRESS NOTE ADULT - PROBLEM SELECTOR PLAN 4
Admitted for septic workup and evaluation ,send blood and urine cx ,serial lactate levels ,monitor vitals closely ,ivfs hydration ,monitor urine output and renal profile ,iv abx initiated Patient was ruled in for sepsis 2/2 to acute uti 2/2 to chronic indwelling Wright catheter , poa - repeat blood cx , ID cons informed , patient is on ceftriaxone
1. Sepsis due to UTI 2/2 to chronic indwelling farris cath poa .  2. Bacteremia with gram negative rods.  3. Chronic urinary retention with indwelling farris

## 2024-08-03 NOTE — PROGRESS NOTE ADULT - PROBLEM SELECTOR PLAN 3
Patient was ruled in for sepsis 2/2 to acute uti 2/2 to chronic indwelling Wright catheter , poa - repeat blood cx , ID cons informed , patient is on ceftriaxone
Patient was ruled in for sepsis 2/2 to acute uti 2/2 to chronic indwelling Farris catheter , poa - repeat blood cx , ID cons informed , patient is on ceftriaxone 1. Sepsis due to UTI 2/2 to chronic indwelling farris cath poa .  2. Bacteremia with gram negative rods.  3. Chronic urinary retention with indwelling farris

## 2024-08-03 NOTE — PROGRESS NOTE ADULT - PROBLEM SELECTOR PLAN 1
Patient found to have wbc of 23 and lac 2.8, fluids and abx added 07/31 . Wright was attempted to flush by RN, unable to flush. Wright changed by ER provider , gross pyuria in Wright.  and ID consults called , iv abx started , septic workup sent , iv hydration ,serial lactate .Patient was ruled in for sepsis 2/2 to acute uti 2/2 to chronic indwelling Wright catheter , poa - repeat blood cx , ID cons informed , patient is on ceftriaxone
Patient found to have wbc of 23 and lac 2.8, fluids and abx added 07/31 . Wright was attempted to flush by RN, unable to flush. Wright changed by ER provider , gross pyuria in Wright.  and ID consults called , iv abx started , septic workup sent , iv hydration ,serial lactate .Patient was ruled in for sepsis 2/2 to acute uti 2/2 to chronic indwelling Wright catheter , poa - repeat blood cx , ID cons informed , patient is on ceftriaxone

## 2024-08-03 NOTE — DIETITIAN INITIAL EVALUATION ADULT - PERTINENT MEDS FT
MEDICATIONS  (STANDING):  aspirin enteric coated 81 milliGRAM(s) Oral daily  atorvastatin 10 milliGRAM(s) Oral at bedtime  cefTRIAXone   IVPB 1000 milliGRAM(s) IV Intermittent every 24 hours  escitalopram 5 milliGRAM(s) Oral daily  heparin   Injectable 5000 Unit(s) SubCutaneous every 12 hours  metoprolol succinate ER 25 milliGRAM(s) Oral daily  pantoprazole    Tablet 40 milliGRAM(s) Oral before breakfast  sodium chloride 0.45%. 1000 milliLiter(s) (45 mL/Hr) IV Continuous <Continuous>  tamsulosin 0.4 milliGRAM(s) Oral at bedtime    MEDICATIONS  (PRN):  acetaminophen     Tablet .. 650 milliGRAM(s) Oral every 6 hours PRN Temp greater or equal to 38C (100.4F), Mild Pain (1 - 3)  aluminum hydroxide/magnesium hydroxide/simethicone Suspension 30 milliLiter(s) Oral every 4 hours PRN Dyspepsia  melatonin 3 milliGRAM(s) Oral at bedtime PRN Insomnia  ondansetron Injectable 4 milliGRAM(s) IV Push every 8 hours PRN Nausea and/or Vomiting

## 2024-08-03 NOTE — DIETITIAN INITIAL EVALUATION ADULT - ORAL NUTRITION SUPPLEMENTS
Magic cup  2x/day to optimize PO intake and provide an additional 290kcal, 9g protein per serving   mighty shake twice daily optimize PO intake and provide additional 220 calories, 6 g protein per serving

## 2024-08-03 NOTE — PROGRESS NOTE ADULT - PROBLEM SELECTOR PROBLEM 3
Bacteremia
Bacteremia
Normal contour; nontender; liver palpable < 2 cm below rib margin, with sharp edge; adequate bowel sound pattern for age; no bruits; spleen tip absent or slightly below rib margin; kidney size and shape, if palpable is acceptable; abdominal distention and masses absent; abdominal wall defects absent; scaphoid abdomen absent; umbilicus with 3 vessels, normal color size, and texture.

## 2024-08-03 NOTE — PROGRESS NOTE ADULT - SUBJECTIVE AND OBJECTIVE BOX
PROGRESS NOTE  Patient is a 89y old  Male who presents with a chief complaint of leakage around Wright catheter (03 Aug 2024 07:35)      OVERNIGHT      HPI:  89-year-old male brought in by ambulance from [facility] for urine leaking around Wright as well as nausea and vomiting.  Patient states he started not feeling well yesterday, patient nauseous, several episodes of vomiting and diarrhea.  Associated with abdominal pain.  Denies fever, chills, chest pain, shortness of breath, cough, upper or lower extremity weakness or paresthesias.  Patient with chronic indwelling Wright. Found to have UTI and bacteremia Admitted for septic workup and evaluation ,send blood and urine cx ,serial lactate levels ,monitor vitals closely hydration ,monitor urine output and renal profile ,iv abx initiated Palliative care consult requested ,to discuss advance directives and complete MOLST  (01 Aug 2024 08:11)    PAST MEDICAL & SURGICAL HISTORY:  CAD (coronary artery disease)      HTN (hypertension)      HLD (hyperlipidemia)      DJD (degenerative joint disease)      Anemia of chronic disease      Enlarged prostate      H/O osteopenia      Urinary retention      Personal history of asbestosis      Hiatal hernia      Hiatal hernia with GERD and esophagitis      Indwelling urinary catheter present      S/P CABG x 4      History of cholecystectomy      H/O kyphoplasty      History of hip surgery      History of appendectomy          MEDICATIONS  (STANDING):  aspirin enteric coated 81 milliGRAM(s) Oral daily  atorvastatin 10 milliGRAM(s) Oral at bedtime  cefTRIAXone   IVPB 1000 milliGRAM(s) IV Intermittent every 24 hours  escitalopram 5 milliGRAM(s) Oral daily  heparin   Injectable 5000 Unit(s) SubCutaneous every 12 hours  metoprolol succinate ER 25 milliGRAM(s) Oral daily  pantoprazole    Tablet 40 milliGRAM(s) Oral before breakfast  sodium chloride 0.45%. 1000 milliLiter(s) (45 mL/Hr) IV Continuous <Continuous>  tamsulosin 0.4 milliGRAM(s) Oral at bedtime    MEDICATIONS  (PRN):  acetaminophen     Tablet .. 650 milliGRAM(s) Oral every 6 hours PRN Temp greater or equal to 38C (100.4F), Mild Pain (1 - 3)  aluminum hydroxide/magnesium hydroxide/simethicone Suspension 30 milliLiter(s) Oral every 4 hours PRN Dyspepsia  melatonin 3 milliGRAM(s) Oral at bedtime PRN Insomnia  ondansetron Injectable 4 milliGRAM(s) IV Push every 8 hours PRN Nausea and/or Vomiting      OBJECTIVE    T(C): 36.6 (08-03-24 @ 04:51), Max: 37 (08-02-24 @ 11:51)  HR: 60 (08-03-24 @ 04:51) (58 - 66)  BP: 114/64 (08-03-24 @ 04:51) (107/60 - 129/58)  RR: 18 (08-03-24 @ 04:51) (18 - 18)  SpO2: 92% (08-03-24 @ 04:51) (90% - 92%)  Wt(kg): --  I&O's Summary    02 Aug 2024 07:01  -  03 Aug 2024 07:00  --------------------------------------------------------  IN: 585 mL / OUT: 0 mL / NET: 585 mL          REVIEW OF SYSTEMS:  CONSTITUTIONAL: No fever, weight loss, or fatigue  EYES: No eye pain, visual disturbances, or discharge  ENMT:   No sinus or throat pain  NECK: No pain or stiffness  RESPIRATORY: No cough, wheezing, chills or hemoptysis; No shortness of breath  CARDIOVASCULAR: No chest pain, palpitations, dizziness, or leg swelling  GASTROINTESTINAL: No abdominal pain. No nausea, vomiting; No diarrhea or constipation. No melena or hematochezia.  GENITOURINARY: No dysuria, frequency, hematuria, or incontinence  NEUROLOGICAL: No headaches, memory loss, loss of strength, numbness, or tremors  SKIN: No itching, burning, rashes, or lesions   MUSCULOSKELETAL: No joint pain or swelling; No muscle, back, or extremity pain    PHYSICAL EXAM:  Appearance: NAD. VS past 24 hrs -as above   HEENT:   Moist oral mucosa. Conjunctiva clear b/l.   Neck : supple  Respiratory: Lungs CTAB.  Gastrointestinal:  Soft, nontender. No rebound. No rigidity. BS present	  Cardiovascular: RRR ,S1S2 present  Neurologic: Non-focal. Moving all extremities.  Extremities: No edema. No erythema. No calf tenderness.  Skin: No rashes, No ecchymoses, No cyanosis.	  wounds ,skin lesions-See skin assesment flow sheet   LABS:                        9.6    11.78 )-----------( 166      ( 02 Aug 2024 06:45 )             30.0     08-02    142  |  111<H>  |  20  ----------------------------<  91  3.8   |  28  |  0.57    Ca    8.6      02 Aug 2024 06:45  Mg     2.1     08-01    TPro  5.5<L>  /  Alb  2.3<L>  /  TBili  0.6  /  DBili  x   /  AST  16  /  ALT  22  /  AlkPhos  51  08-02    CAPILLARY BLOOD GLUCOSE          Urinalysis Basic - ( 02 Aug 2024 06:45 )    Color: x / Appearance: x / SG: x / pH: x  Gluc: 91 mg/dL / Ketone: x  / Bili: x / Urobili: x   Blood: x / Protein: x / Nitrite: x   Leuk Esterase: x / RBC: x / WBC x   Sq Epi: x / Non Sq Epi: x / Bacteria: x        Culture - Blood (collected 31 Jul 2024 21:00)  Source: .Blood Blood-Peripheral  Gram Stain (01 Aug 2024 17:06):    Growth in aerobic bottle: Gram Negative Rods  Preliminary Report (02 Aug 2024 10:14):    Growth in aerobic bottle: Proteus mirabilis    Direct identification is available within approximately 3-5    hours either by Blood Panel Multiplexed PCR or Direct    MALDI-TOF. Details: [email or web address]  Organism: Blood Culture PCR (01 Aug 2024 19:36)  Organism: Blood Culture PCR (01 Aug 2024 19:36)    Culture - Urine (collected 31 Jul 2024 21:00)  Source: Clean Catch Clean Catch (Midstream)  Preliminary Report (02 Aug 2024 08:20):    >100,000 CFU/ml Gram Negative Rods    Culture - Blood (collected 31 Jul 2024 20:50)  Source: .Blood Blood-Peripheral  Preliminary Report (03 Aug 2024 02:02):    No growth at 48 Hours      RADIOLOGY & ADDITIONAL TESTS:   reviewed elctronically  ASSESSMENT/PLAN: 	     PROGRESS NOTE  Patient is a 89y old  Male who presents with a chief complaint of leakage around Wright catheter (03 Aug 2024 07:35)  Chart and available morning labs /imaging are reviewed electronically , urgent issues addressed . More information  is being added upon completion of rounds , when more information is collected and management discussed with consultants , medical staff and social service/case management on the floor     OVERNIGHT    No new issues reported by medical staff . All above noted Patient is resting in a bed comfortably .Confused ,poor mentation .No distress noted   HPI:  89-year-old male brought in by ambulance from [facility] for urine leaking around Wright as well as nausea and vomiting.  Patient states he started not feeling well yesterday, patient nauseous, several episodes of vomiting and diarrhea.  Associated with abdominal pain.  Denies fever, chills, chest pain, shortness of breath, cough, upper or lower extremity weakness or paresthesias.  Patient with chronic indwelling Wright. Found to have UTI and bacteremia Admitted for septic workup and evaluation ,send blood and urine cx ,serial lactate levels ,monitor vitals closely hydration ,monitor urine output and renal profile ,iv abx initiated Palliative care consult requested ,to discuss advance directives and complete MOLST  (01 Aug 2024 08:11)    PAST MEDICAL & SURGICAL HISTORY:  CAD (coronary artery disease)      HTN (hypertension)      HLD (hyperlipidemia)      DJD (degenerative joint disease)      Anemia of chronic disease      Enlarged prostate      H/O osteopenia      Urinary retention      Personal history of asbestosis      Hiatal hernia      Hiatal hernia with GERD and esophagitis      Indwelling urinary catheter present      S/P CABG x 4      History of cholecystectomy      H/O kyphoplasty      History of hip surgery      History of appendectomy          MEDICATIONS  (STANDING):  aspirin enteric coated 81 milliGRAM(s) Oral daily  atorvastatin 10 milliGRAM(s) Oral at bedtime  cefTRIAXone   IVPB 1000 milliGRAM(s) IV Intermittent every 24 hours  escitalopram 5 milliGRAM(s) Oral daily  heparin   Injectable 5000 Unit(s) SubCutaneous every 12 hours  metoprolol succinate ER 25 milliGRAM(s) Oral daily  pantoprazole    Tablet 40 milliGRAM(s) Oral before breakfast  sodium chloride 0.45%. 1000 milliLiter(s) (45 mL/Hr) IV Continuous <Continuous>  tamsulosin 0.4 milliGRAM(s) Oral at bedtime    MEDICATIONS  (PRN):  acetaminophen     Tablet .. 650 milliGRAM(s) Oral every 6 hours PRN Temp greater or equal to 38C (100.4F), Mild Pain (1 - 3)  aluminum hydroxide/magnesium hydroxide/simethicone Suspension 30 milliLiter(s) Oral every 4 hours PRN Dyspepsia  melatonin 3 milliGRAM(s) Oral at bedtime PRN Insomnia  ondansetron Injectable 4 milliGRAM(s) IV Push every 8 hours PRN Nausea and/or Vomiting      OBJECTIVE    T(C): 36.6 (08-03-24 @ 04:51), Max: 37 (08-02-24 @ 11:51)  HR: 60 (08-03-24 @ 04:51) (58 - 66)  BP: 114/64 (08-03-24 @ 04:51) (107/60 - 129/58)  RR: 18 (08-03-24 @ 04:51) (18 - 18)  SpO2: 92% (08-03-24 @ 04:51) (90% - 92%)  Wt(kg): --  I&O's Summary    02 Aug 2024 07:01  -  03 Aug 2024 07:00  --------------------------------------------------------  IN: 585 mL / OUT: 0 mL / NET: 585 mL          REVIEW OF SYSTEMS:  CONSTITUTIONAL: No fever, weight loss, or fatigue  EYES: No eye pain, visual disturbances, or discharge  ENMT:   No sinus or throat pain  NECK: No pain or stiffness  RESPIRATORY: No cough, wheezing, chills or hemoptysis; No shortness of breath  CARDIOVASCULAR: No chest pain, palpitations, dizziness, or leg swelling  GASTROINTESTINAL: No abdominal pain. No nausea, vomiting; No diarrhea or constipation. No melena or hematochezia.  GENITOURINARY: No dysuria, frequency, hematuria, or incontinence  NEUROLOGICAL: No headaches, memory loss, loss of strength, numbness, or tremors  SKIN: No itching, burning, rashes, or lesions   MUSCULOSKELETAL: No joint pain or swelling; No muscle, back, or extremity pain    PHYSICAL EXAM:  Appearance: NAD. VS past 24 hrs -as above   HEENT:   Moist oral mucosa. Conjunctiva clear b/l.   Neck : supple  Respiratory: Lungs CTAB.  Gastrointestinal:  Soft, nontender. No rebound. No rigidity. BS present	  Cardiovascular: RRR ,S1S2 present  Neurologic: Non-focal. Moving all extremities.  Extremities: No edema. No erythema. No calf tenderness.  Skin: No rashes, No ecchymoses, No cyanosis.	  wounds ,skin lesions-See skin assesment flow sheet   LABS:                        9.6    11.78 )-----------( 166      ( 02 Aug 2024 06:45 )             30.0     08-02    142  |  111<H>  |  20  ----------------------------<  91  3.8   |  28  |  0.57    Ca    8.6      02 Aug 2024 06:45  Mg     2.1     08-01    TPro  5.5<L>  /  Alb  2.3<L>  /  TBili  0.6  /  DBili  x   /  AST  16  /  ALT  22  /  AlkPhos  51  08-02    CAPILLARY BLOOD GLUCOSE          Urinalysis Basic - ( 02 Aug 2024 06:45 )    Color: x / Appearance: x / SG: x / pH: x  Gluc: 91 mg/dL / Ketone: x  / Bili: x / Urobili: x   Blood: x / Protein: x / Nitrite: x   Leuk Esterase: x / RBC: x / WBC x   Sq Epi: x / Non Sq Epi: x / Bacteria: x        Culture - Blood (collected 31 Jul 2024 21:00)  Source: .Blood Blood-Peripheral  Gram Stain (01 Aug 2024 17:06):    Growth in aerobic bottle: Gram Negative Rods  Preliminary Report (02 Aug 2024 10:14):    Growth in aerobic bottle: Proteus mirabilis    Direct identification is available within approximately 3-5    hours either by Blood Panel Multiplexed PCR or Direct    MALDI-TOF. Details: [email or web address]  Organism: Blood Culture PCR (01 Aug 2024 19:36)  Organism: Blood Culture PCR (01 Aug 2024 19:36)    Culture - Urine (collected 31 Jul 2024 21:00)  Source: Clean Catch Clean Catch (Midstream)  Preliminary Report (02 Aug 2024 08:20):    >100,000 CFU/ml Gram Negative Rods    Culture - Blood (collected 31 Jul 2024 20:50)  Source: .Blood Blood-Peripheral  Preliminary Report (03 Aug 2024 02:02):    No growth at 48 Hours      RADIOLOGY & ADDITIONAL TESTS:   reviewed elctronically  ASSESSMENT/PLAN: 	  25 minutes aggregate time was spent on this visit, 50% visit time spent in care co-ordination with other attendings and counselling patient .I have discussed care plan with patient / HCP/family member ,who expressed understanding of problems treatment and their effect and side effects, alternatives in details. I have asked if they have any questions and concerns and appropriately addressed them to best of my ability.

## 2024-08-04 LAB
ANION GAP SERPL CALC-SCNC: 5 MMOL/L — SIGNIFICANT CHANGE UP (ref 5–17)
BUN SERPL-MCNC: 12 MG/DL — SIGNIFICANT CHANGE UP (ref 7–23)
CALCIUM SERPL-MCNC: 8.4 MG/DL — LOW (ref 8.5–10.1)
CHLORIDE SERPL-SCNC: 108 MMOL/L — SIGNIFICANT CHANGE UP (ref 96–108)
CO2 SERPL-SCNC: 29 MMOL/L — SIGNIFICANT CHANGE UP (ref 22–31)
CREAT SERPL-MCNC: 0.55 MG/DL — SIGNIFICANT CHANGE UP (ref 0.5–1.3)
EGFR: 94 ML/MIN/1.73M2 — SIGNIFICANT CHANGE UP
GLUCOSE SERPL-MCNC: 93 MG/DL — SIGNIFICANT CHANGE UP (ref 70–99)
HCT VFR BLD CALC: 28.1 % — LOW (ref 39–50)
HGB BLD-MCNC: 9.4 G/DL — LOW (ref 13–17)
MCHC RBC-ENTMCNC: 33.5 GM/DL — SIGNIFICANT CHANGE UP (ref 32–36)
MCHC RBC-ENTMCNC: 33.9 PG — SIGNIFICANT CHANGE UP (ref 27–34)
MCV RBC AUTO: 101.4 FL — HIGH (ref 80–100)
NRBC # BLD: 0 /100 WBCS — SIGNIFICANT CHANGE UP (ref 0–0)
PLATELET # BLD AUTO: 161 K/UL — SIGNIFICANT CHANGE UP (ref 150–400)
POTASSIUM SERPL-MCNC: 3.6 MMOL/L — SIGNIFICANT CHANGE UP (ref 3.5–5.3)
POTASSIUM SERPL-SCNC: 3.6 MMOL/L — SIGNIFICANT CHANGE UP (ref 3.5–5.3)
RBC # BLD: 2.77 M/UL — LOW (ref 4.2–5.8)
RBC # FLD: 15.4 % — HIGH (ref 10.3–14.5)
SODIUM SERPL-SCNC: 142 MMOL/L — SIGNIFICANT CHANGE UP (ref 135–145)
WBC # BLD: 7.35 K/UL — SIGNIFICANT CHANGE UP (ref 3.8–10.5)
WBC # FLD AUTO: 7.35 K/UL — SIGNIFICANT CHANGE UP (ref 3.8–10.5)

## 2024-08-04 PROCEDURE — 99233 SBSQ HOSP IP/OBS HIGH 50: CPT

## 2024-08-04 RX ORDER — CEFPODOXIME PROXETIL 100 MG
200 TABLET ORAL EVERY 12 HOURS
Refills: 0 | Status: DISCONTINUED | OUTPATIENT
Start: 2024-08-05 | End: 2024-08-05

## 2024-08-04 RX ADMIN — Medication 5 MILLIGRAM(S): at 12:22

## 2024-08-04 RX ADMIN — HEPARIN SODIUM 5000 UNIT(S): 1000 INJECTION, SOLUTION INTRAVENOUS; SUBCUTANEOUS at 18:41

## 2024-08-04 RX ADMIN — ATORVASTATIN CALCIUM 10 MILLIGRAM(S): 40 TABLET, FILM COATED ORAL at 21:52

## 2024-08-04 RX ADMIN — HEPARIN SODIUM 5000 UNIT(S): 1000 INJECTION, SOLUTION INTRAVENOUS; SUBCUTANEOUS at 05:07

## 2024-08-04 RX ADMIN — DEXTROSE MONOHYDRATE, SODIUM CHLORIDE, SODIUM LACTATE, CALCIUM CHLORIDE, MAGNESIUM CHLORIDE 45 MILLILITER(S): 1.5; 538; 448; 18.4; 5.08 SOLUTION INTRAPERITONEAL at 21:52

## 2024-08-04 RX ADMIN — DEXTROSE MONOHYDRATE, SODIUM CHLORIDE, SODIUM LACTATE, CALCIUM CHLORIDE, MAGNESIUM CHLORIDE 45 MILLILITER(S): 1.5; 538; 448; 18.4; 5.08 SOLUTION INTRAPERITONEAL at 12:22

## 2024-08-04 RX ADMIN — Medication 25 MILLIGRAM(S): at 05:07

## 2024-08-04 RX ADMIN — PANTOPRAZOLE SODIUM 40 MILLIGRAM(S): 20 TABLET, DELAYED RELEASE ORAL at 05:07

## 2024-08-04 RX ADMIN — Medication 0.4 MILLIGRAM(S): at 21:52

## 2024-08-04 RX ADMIN — Medication 81 MILLIGRAM(S): at 12:22

## 2024-08-04 NOTE — PROGRESS NOTE ADULT - SUBJECTIVE AND OBJECTIVE BOX
Patient is a 89y Male with a known history of :  Malfunction of Wright catheter [T83.011A]    Acute UTI [N39.0]    Bacteremia [R78.81]    Personal history of asbestosis [Z87.09]    Prophylactic measure [Z29.9]    CAD (coronary artery disease) [I25.10]    Abnormal CT scan, esophagus [R93.3]    Elevated lactic acid level [R79.89]    Elevated WBCs [D72.829]    Folate deficiency [E53.8]      HPI:  89-year-old male brought in by ambulance from [facility] for urine leaking around Wright as well as nausea and vomiting.  Patient states he started not feeling well yesterday, patient nauseous, several episodes of vomiting and diarrhea.  Associated with abdominal pain.  Denies fever, chills, chest pain, shortness of breath, cough, upper or lower extremity weakness or paresthesias.  Patient with chronic indwelling Wright. Found to have UTI and bacteremia Admitted for septic workup and evaluation ,send blood and urine cx ,serial lactate levels ,monitor vitals closely hydration ,monitor urine output and renal profile ,iv abx initiated Palliative care consult requested ,to discuss advance directives and complete MOLST  (01 Aug 2024 08:11)      REVIEW OF SYSTEMS:    CONSTITUTIONAL: No fever, weight loss, or fatigue  EYES: No eye pain, visual disturbances, or discharge  ENMT:  No difficulty hearing, tinnitus, vertigo; No sinus or throat pain  NECK: No pain or stiffness  BREASTS: No pain, masses, or nipple discharge  RESPIRATORY: No cough, wheezing, chills or hemoptysis; No shortness of breath  CARDIOVASCULAR: No chest pain, palpitations, dizziness, or leg swelling  GASTROINTESTINAL: No abdominal or epigastric pain. No nausea, vomiting, or hematemesis; No diarrhea or constipation. No melena or hematochezia.  GENITOURINARY: No dysuria, frequency, hematuria, or incontinence  NEUROLOGICAL: No headaches, memory loss, loss of strength, numbness, or tremors  SKIN: No itching, burning, rashes, or lesions   LYMPH NODES: No enlarged glands  ENDOCRINE: No heat or cold intolerance; No hair loss  MUSCULOSKELETAL: No joint pain or swelling; No muscle, back, or extremity pain  PSYCHIATRIC: No depression, anxiety, mood swings, or difficulty sleeping  HEME/LYMPH: No easy bruising, or bleeding gums  ALLERGY AND IMMUNOLOGIC: No hives or eczema    MEDICATIONS  (STANDING):  aspirin enteric coated 81 milliGRAM(s) Oral daily  atorvastatin 10 milliGRAM(s) Oral at bedtime  cefTRIAXone   IVPB 1000 milliGRAM(s) IV Intermittent every 24 hours  escitalopram 5 milliGRAM(s) Oral daily  heparin   Injectable 5000 Unit(s) SubCutaneous every 12 hours  metoprolol succinate ER 25 milliGRAM(s) Oral daily  pantoprazole    Tablet 40 milliGRAM(s) Oral before breakfast  sodium chloride 0.45%. 1000 milliLiter(s) (45 mL/Hr) IV Continuous <Continuous>  tamsulosin 0.4 milliGRAM(s) Oral at bedtime    MEDICATIONS  (PRN):  acetaminophen     Tablet .. 650 milliGRAM(s) Oral every 6 hours PRN Temp greater or equal to 38C (100.4F), Mild Pain (1 - 3)  aluminum hydroxide/magnesium hydroxide/simethicone Suspension 30 milliLiter(s) Oral every 4 hours PRN Dyspepsia  melatonin 3 milliGRAM(s) Oral at bedtime PRN Insomnia  ondansetron Injectable 4 milliGRAM(s) IV Push every 8 hours PRN Nausea and/or Vomiting      ALLERGIES: No Known Allergies      FAMILY HISTORY:  No pertinent family history in first degree relatives        PHYSICAL EXAMINATION:  -----------------------------  T(C): 36.7 (08-04-24 @ 05:13), Max: 36.8 (08-03-24 @ 11:50)  HR: 58 (08-04-24 @ 05:13) (58 - 68)  BP: 121/66 (08-04-24 @ 05:13) (121/66 - 133/73)  RR: 18 (08-04-24 @ 05:13) (18 - 18)  SpO2: 90% (08-04-24 @ 05:13) (90% - 92%)  Wt(kg): --    08-03 @ 07:01  -  08-04 @ 07:00  --------------------------------------------------------  IN:  Total IN: 0 mL    OUT:    Indwelling Catheter - Urethral (mL): 600 mL  Total OUT: 600 mL    Total NET: -600 mL            VITALS  T(C): 36.7 (08-04-24 @ 05:13), Max: 36.8 (08-03-24 @ 11:50)  HR: 58 (08-04-24 @ 05:13) (58 - 68)  BP: 121/66 (08-04-24 @ 05:13) (121/66 - 133/73)  RR: 18 (08-04-24 @ 05:13) (18 - 18)  SpO2: 90% (08-04-24 @ 05:13) (90% - 92%)    Constitutional: well developed, normal appearance, well groomed, well nourished, no deformities and no acute distress.   Eyes: the conjunctiva exhibited no abnormalities and the eyelids demonstrated no xanthelasmas.   HEENT: normal oral mucosa, no oral pallor and no oral cyanosis.   Neck: normal jugular venous A waves present, normal jugular venous V waves present and no jugular venous velazquez A waves.   Pulmonary: no respiratory distress, normal respiratory rhythm and effort, no accessory muscle use and lungs were clear to auscultation bilaterally.   Cardiovascular: heart rate and rhythm were normal, normal S1 and S2 and no murmur, gallop, rub, heave or thrill are present.   Abdomen: soft, non-tender, no hepato-splenomegaly and no abdominal mass palpated.   Musculoskeletal: the gait could not be assessed..   Extremities: no clubbing of the fingernails, no localized cyanosis, no petechial hemorrhages and no ischemic changes.   Skin: normal skin color and pigmentation, no rash, no venous stasis, no skin lesions, no skin ulcer and no xanthoma was observed.   Psychiatric: oriented to person, place, and time, the affect was normal, the mood was normal and not feeling anxious.     LABS:   --------  08-03    142  |  107  |  17  ----------------------------<  88  3.7   |  29  |  0.57    Ca    8.3<L>      03 Aug 2024 06:38                           9.4    7.35  )-----------( 161      ( 04 Aug 2024 06:54 )             28.1                 RADIOLOGY:  -----------------    ECG:     ECHO:

## 2024-08-04 NOTE — PROGRESS NOTE ADULT - NUTRITIONAL ASSESSMENT
MEDICATIONS  (STANDING):  aspirin enteric coated 81 milliGRAM(s) Oral daily  atorvastatin 10 milliGRAM(s) Oral at bedtime  cefTRIAXone   IVPB 1000 milliGRAM(s) IV Intermittent every 24 hours  escitalopram 5 milliGRAM(s) Oral daily  heparin   Injectable 5000 Unit(s) SubCutaneous every 12 hours  metoprolol succinate ER 25 milliGRAM(s) Oral daily  pantoprazole    Tablet 40 milliGRAM(s) Oral before breakfast  sodium chloride 0.45%. 1000 milliLiter(s) (45 mL/Hr) IV Continuous <Continuous>  tamsulosin 0.4 milliGRAM(s) Oral at bedtime

## 2024-08-04 NOTE — PROGRESS NOTE ADULT - SUBJECTIVE AND OBJECTIVE BOX
Interval History:    CENTRAL LINE:   [  ] YES       [  ] NO  DAVIS:                 [  ] YES       [  ] NO         REVIEW OF SYSTEMS:  All Systems below were reviewed and are negative [  ]  HEENT:  ID:  Pulmonary:  Cardiac:  GI:  Renal:  Musculoskeletal:  All other systems above were reviewed and are negative   [  ]      MEDICATIONS  (STANDING):  aspirin enteric coated 81 milliGRAM(s) Oral daily  atorvastatin 10 milliGRAM(s) Oral at bedtime  cefTRIAXone   IVPB 1000 milliGRAM(s) IV Intermittent every 24 hours  escitalopram 5 milliGRAM(s) Oral daily  heparin   Injectable 5000 Unit(s) SubCutaneous every 12 hours  metoprolol succinate ER 25 milliGRAM(s) Oral daily  pantoprazole    Tablet 40 milliGRAM(s) Oral before breakfast  sodium chloride 0.45%. 1000 milliLiter(s) (45 mL/Hr) IV Continuous <Continuous>  tamsulosin 0.4 milliGRAM(s) Oral at bedtime    MEDICATIONS  (PRN):  acetaminophen     Tablet .. 650 milliGRAM(s) Oral every 6 hours PRN Temp greater or equal to 38C (100.4F), Mild Pain (1 - 3)  aluminum hydroxide/magnesium hydroxide/simethicone Suspension 30 milliLiter(s) Oral every 4 hours PRN Dyspepsia  melatonin 3 milliGRAM(s) Oral at bedtime PRN Insomnia  ondansetron Injectable 4 milliGRAM(s) IV Push every 8 hours PRN Nausea and/or Vomiting      Vital Signs Last 24 Hrs  T(C): 36.6 (04 Aug 2024 11:16), Max: 36.8 (03 Aug 2024 20:48)  T(F): 97.8 (04 Aug 2024 11:16), Max: 98.2 (03 Aug 2024 20:48)  HR: 62 (04 Aug 2024 18:58) (58 - 69)  BP: 106/54 (04 Aug 2024 18:58) (106/54 - 142/63)  BP(mean): --  RR: 19 (04 Aug 2024 11:16) (18 - 19)  SpO2: 95% (04 Aug 2024 11:16) (90% - 95%)    Parameters below as of 04 Aug 2024 11:16  Patient On (Oxygen Delivery Method): room air        I&O's Summary    03 Aug 2024 07:01  -  04 Aug 2024 07:00  --------------------------------------------------------  IN: 0 mL / OUT: 600 mL / NET: -600 mL        PHYSICAL EXAM:  HEENT: NC/AT; PERRLA  Neck: Soft; no tenderness  Lungs: CTA bilaterally; no wheezing.   Heart:  Abdomen:  Genital/ Rectal:  Extremities:  Neurologic:  Vascular:      LABORATORY:    CBC Full  -  ( 04 Aug 2024 06:54 )  WBC Count : 7.35 K/uL  RBC Count : 2.77 M/uL  Hemoglobin : 9.4 g/dL  Hematocrit : 28.1 %  Platelet Count - Automated : 161 K/uL  Mean Cell Volume : 101.4 fl  Mean Cell Hemoglobin : 33.9 pg  Mean Cell Hemoglobin Concentration : 33.5 gm/dL  Auto Neutrophil # : x  Auto Lymphocyte # : x  Auto Monocyte # : x  Auto Eosinophil # : x  Auto Basophil # : x  Auto Neutrophil % : x  Auto Lymphocyte % : x  Auto Monocyte % : x  Auto Eosinophil % : x  Auto Basophil % : x          08-04    142  |  108  |  12  ----------------------------<  93  3.6   |  29  |  0.55    Ca    8.4<L>      04 Aug 2024 06:54            Assessment and Plan:          Alexys Steven MD   [phone].    He is afebrile  Comfortable      MEDICATIONS  (STANDING):  aspirin enteric coated 81 milliGRAM(s) Oral daily  atorvastatin 10 milliGRAM(s) Oral at bedtime  cefTRIAXone   IVPB 1000 milliGRAM(s) IV Intermittent every 24 hours  escitalopram 5 milliGRAM(s) Oral daily  heparin   Injectable 5000 Unit(s) SubCutaneous every 12 hours  metoprolol succinate ER 25 milliGRAM(s) Oral daily  pantoprazole    Tablet 40 milliGRAM(s) Oral before breakfast  sodium chloride 0.45%. 1000 milliLiter(s) (45 mL/Hr) IV Continuous <Continuous>  tamsulosin 0.4 milliGRAM(s) Oral at bedtime    MEDICATIONS  (PRN):  acetaminophen     Tablet .. 650 milliGRAM(s) Oral every 6 hours PRN Temp greater or equal to 38C (100.4F), Mild Pain (1 - 3)  aluminum hydroxide/magnesium hydroxide/simethicone Suspension 30 milliLiter(s) Oral every 4 hours PRN Dyspepsia  melatonin 3 milliGRAM(s) Oral at bedtime PRN Insomnia  ondansetron Injectable 4 milliGRAM(s) IV Push every 8 hours PRN Nausea and/or Vomiting      Vital Signs Last 24 Hrs  T(C): 36.6 (04 Aug 2024 11:16), Max: 36.8 (03 Aug 2024 20:48)  T(F): 97.8 (04 Aug 2024 11:16), Max: 98.2 (03 Aug 2024 20:48)  HR: 62 (04 Aug 2024 18:58) (58 - 69)  BP: 106/54 (04 Aug 2024 18:58) (106/54 - 142/63)  BP(mean): --  RR: 19 (04 Aug 2024 11:16) (18 - 19)  SpO2: 95% (04 Aug 2024 11:16) (90% - 95%)    Parameters below as of 04 Aug 2024 11:16  Patient On (Oxygen Delivery Method): room air        I&O's Summary    03 Aug 2024 07:01  -  04 Aug 2024 07:00  --------------------------------------------------------  IN: 0 mL / OUT: 600 mL / NET: -600 mL        PHYSICAL EXAM:  HEENT: NC/AT; PERRLA  Neck: Soft; no tenderness  Lungs: CTA bilaterally; no wheezing.   Heart:  Abdomen:  Genital/ Rectal:  Extremities:  Neurologic:  Vascular:      LABORATORY:    CBC Full  -  ( 04 Aug 2024 06:54 )  WBC Count : 7.35 K/uL  RBC Count : 2.77 M/uL  Hemoglobin : 9.4 g/dL  Hematocrit : 28.1 %  Platelet Count - Automated : 161 K/uL  Mean Cell Volume : 101.4 fl  Mean Cell Hemoglobin : 33.9 pg  Mean Cell Hemoglobin Concentration : 33.5 gm/dL  Auto Neutrophil # : x  Auto Lymphocyte # : x  Auto Monocyte # : x  Auto Eosinophil # : x  Auto Basophil # : x  Auto Neutrophil % : x  Auto Lymphocyte % : x  Auto Monocyte % : x  Auto Eosinophil % : x  Auto Basophil % : x          08-04    142  |  108  |  12  ----------------------------<  93  3.6   |  29  |  0.55    Ca    8.4<L>      04 Aug 2024 06:54      Assessment and Plan:    1. Sepsis due to UTI.  2. Bacteremia with gram negative rods.  3. Chronic urinary retention with indwelling farris  4. Weakness.      . The patient is improving.   , Blood culture grew proteus mirabilis, Repeated blood cultures are all negative.   , Discontinue IV Rocephin. Add po Vantin 200 mg bid for 7 days.   . Supportive care,        Alexys Steven MD   [phone].    He is afebrile  Comfortable      MEDICATIONS  (STANDING):  aspirin enteric coated 81 milliGRAM(s) Oral daily  atorvastatin 10 milliGRAM(s) Oral at bedtime  cefTRIAXone   IVPB 1000 milliGRAM(s) IV Intermittent every 24 hours  escitalopram 5 milliGRAM(s) Oral daily  heparin   Injectable 5000 Unit(s) SubCutaneous every 12 hours  metoprolol succinate ER 25 milliGRAM(s) Oral daily  pantoprazole    Tablet 40 milliGRAM(s) Oral before breakfast  sodium chloride 0.45%. 1000 milliLiter(s) (45 mL/Hr) IV Continuous <Continuous>  tamsulosin 0.4 milliGRAM(s) Oral at bedtime    MEDICATIONS  (PRN):  acetaminophen     Tablet .. 650 milliGRAM(s) Oral every 6 hours PRN Temp greater or equal to 38C (100.4F), Mild Pain (1 - 3)  aluminum hydroxide/magnesium hydroxide/simethicone Suspension 30 milliLiter(s) Oral every 4 hours PRN Dyspepsia  melatonin 3 milliGRAM(s) Oral at bedtime PRN Insomnia  ondansetron Injectable 4 milliGRAM(s) IV Push every 8 hours PRN Nausea and/or Vomiting      Vital Signs Last 24 Hrs  T(C): 36.6 (04 Aug 2024 11:16), Max: 36.8 (03 Aug 2024 20:48)  T(F): 97.8 (04 Aug 2024 11:16), Max: 98.2 (03 Aug 2024 20:48)  HR: 62 (04 Aug 2024 18:58) (58 - 69)  BP: 106/54 (04 Aug 2024 18:58) (106/54 - 142/63)  BP(mean): --  RR: 19 (04 Aug 2024 11:16) (18 - 19)  SpO2: 95% (04 Aug 2024 11:16) (90% - 95%)    Parameters below as of 04 Aug 2024 11:16  Patient On (Oxygen Delivery Method): room air        I&O's Summary    03 Aug 2024 07:01  -  04 Aug 2024 07:00  --------------------------------------------------------  IN: 0 mL / OUT: 600 mL / NET: -600 mL    PHYSICAL EXAM:  HEENT: NC/AT; PERRLA  Neck: Soft; no tenderness  Lungs: Coarse BS bilaterally; no wheezing.   Heart: RRR, no murmurs.   Abdomen: Soft, no tenderness.   Genital/ Rectal: Farris with clear urine.   Extremities: No ulcers.   Neurologic: Awake.       LABORATORY:    CBC Full  -  ( 04 Aug 2024 06:54 )  WBC Count : 7.35 K/uL  RBC Count : 2.77 M/uL  Hemoglobin : 9.4 g/dL  Hematocrit : 28.1 %  Platelet Count - Automated : 161 K/uL  Mean Cell Volume : 101.4 fl  Mean Cell Hemoglobin : 33.9 pg  Mean Cell Hemoglobin Concentration : 33.5 gm/dL  Auto Neutrophil # : x  Auto Lymphocyte # : x  Auto Monocyte # : x  Auto Eosinophil # : x  Auto Basophil # : x  Auto Neutrophil % : x  Auto Lymphocyte % : x  Auto Monocyte % : x  Auto Eosinophil % : x  Auto Basophil % : x          08-04    142  |  108  |  12  ----------------------------<  93  3.6   |  29  |  0.55    Ca    8.4<L>      04 Aug 2024 06:54      Assessment and Plan:    1. Sepsis due to UTI.  2. Bacteremia with gram negative rods.  3. Chronic urinary retention with indwelling farris  4. Weakness.      . The patient is improving.   , Blood culture grew proteus mirabilis, Repeated blood cultures are all negative.   , Discontinue IV Rocephin. Add po Vantin 200 mg bid for 7 days.   . Supportive care,        Alexys Steven MD   [phone].

## 2024-08-04 NOTE — PROGRESS NOTE ADULT - SUBJECTIVE AND OBJECTIVE BOX
CHIEF COMPLAINT/ REASON FOR VISIT  .. Patient was seen to address the  issue listed under PROBLEM LIST which is located toward bottom of this note     CARISSA CUNNINGHAM    [facility] [address]    Allergies    No Known Allergies    Intolerances        PAST MEDICAL & SURGICAL HISTORY:  CAD (coronary artery disease)      HTN (hypertension)      HLD (hyperlipidemia)      DJD (degenerative joint disease)      Anemia of chronic disease      Enlarged prostate      H/O osteopenia      Urinary retention      Personal history of asbestosis      Hiatal hernia      Hiatal hernia with GERD and esophagitis      Indwelling urinary catheter present      S/P CABG x 4      History of cholecystectomy      H/O kyphoplasty      History of hip surgery      History of appendectomy          FAMILY HISTORY:  No pertinent family history in first degree relatives        Home Medications:  ascorbic acid 500 mg oral tablet: 2 tab(s) orally once a day (01 Aug 2024 11:04)  aspirin 81 mg oral delayed release tablet: 1 tab(s) orally once a day (01 Aug 2024 11:04)  atorvastatin 20 mg oral tablet: 1 tab(s) orally once a day (at bedtime) (01 Aug 2024 11:04)  cyanocobalamin 1000 mcg oral tablet: 1 tab(s) orally once a day (01 Aug 2024 11:04)  Dulcolax Laxative 10 mg rectal suppository: 1 suppository(ies) rectally as needed for  constipation (01 Aug 2024 11:04)  escitalopram 10 mg oral tablet: 1 tab(s) orally once a day (01 Aug 2024 11:04)  ferrous sulfate 325 mg (65 mg elemental iron) oral tablet: 1 tab(s) orally once a day (01 Aug 2024 11:04)  finasteride 5 mg oral tablet: 1 tab(s) orally once a day (01 Aug 2024 11:04)  Fleet Enema 19 g-7 g rectal enema: 133 milliliter(s) rectally as needed for  constipation (01 Aug 2024 11:04)  melatonin 5 mg oral tablet: 1 tab(s) orally once a day (01 Aug 2024 11:04)  Metoprolol Succinate ER 25 mg oral tablet, extended release: 1 tab(s) orally once a day (01 Aug 2024 11:04)  Milk of Magnesia 1200 mg/15 mL oral liquid: 30 milliliter(s) orally once a day as needed for  constipation (01 Aug 2024 11:04)  Multiple Vitamins with Minerals oral tablet: 1 tab(s) orally once a day (01 Aug 2024 11:04)  ondansetron 4 mg oral tablet: 1 tab(s) orally every 8 hours (01 Aug 2024 11:03)  tamsulosin 0.4 mg oral capsule: 1 cap(s) orally once a day (in the evening) (01 Aug 2024 11:04)      MEDICATIONS  (STANDING):  aspirin enteric coated 81 milliGRAM(s) Oral daily  atorvastatin 10 milliGRAM(s) Oral at bedtime  cefTRIAXone   IVPB 1000 milliGRAM(s) IV Intermittent every 24 hours  escitalopram 5 milliGRAM(s) Oral daily  heparin   Injectable 5000 Unit(s) SubCutaneous every 12 hours  metoprolol succinate ER 25 milliGRAM(s) Oral daily  pantoprazole    Tablet 40 milliGRAM(s) Oral before breakfast  sodium chloride 0.45%. 1000 milliLiter(s) (45 mL/Hr) IV Continuous <Continuous>  tamsulosin 0.4 milliGRAM(s) Oral at bedtime    MEDICATIONS  (PRN):  acetaminophen     Tablet .. 650 milliGRAM(s) Oral every 6 hours PRN Temp greater or equal to 38C (100.4F), Mild Pain (1 - 3)  aluminum hydroxide/magnesium hydroxide/simethicone Suspension 30 milliLiter(s) Oral every 4 hours PRN Dyspepsia  melatonin 3 milliGRAM(s) Oral at bedtime PRN Insomnia  ondansetron Injectable 4 milliGRAM(s) IV Push every 8 hours PRN Nausea and/or Vomiting              Vital Signs Last 24 Hrs  T(C): 36.7 (04 Aug 2024 05:13), Max: 36.8 (03 Aug 2024 11:50)  T(F): 98 (04 Aug 2024 05:13), Max: 98.3 (03 Aug 2024 11:50)  HR: 58 (04 Aug 2024 05:13) (58 - 68)  BP: 121/66 (04 Aug 2024 05:13) (121/66 - 133/73)  BP(mean): --  RR: 18 (04 Aug 2024 05:13) (18 - 18)  SpO2: 90% (04 Aug 2024 05:13) (90% - 92%)    Parameters below as of 04 Aug 2024 05:13  Patient On (Oxygen Delivery Method): room air          08-03-24 @ 07:01  -  08-04-24 @ 07:00  --------------------------------------------------------  IN: 0 mL / OUT: 600 mL / NET: -600 mL              LABS:                        9.0    6.50  )-----------( 149      ( 03 Aug 2024 06:38 )             27.8     08-03    142  |  107  |  17  ----------------------------<  88  3.7   |  29  |  0.57    Ca    8.3<L>      03 Aug 2024 06:38        Urinalysis Basic - ( 03 Aug 2024 06:38 )    Color: x / Appearance: x / SG: x / pH: x  Gluc: 88 mg/dL / Ketone: x  / Bili: x / Urobili: x   Blood: x / Protein: x / Nitrite: x   Leuk Esterase: x / RBC: x / WBC x   Sq Epi: x / Non Sq Epi: x / Bacteria: x            WBC:  WBC Count: 6.50 K/uL (08-03 @ 06:38)  WBC Count: 11.78 K/uL (08-02 @ 06:45)  WBC Count: 19.54 K/uL (08-01 @ 08:33)  WBC Count: 23.00 K/uL (07-31 @ 21:00)      MICROBIOLOGY:  RECENT CULTURES:  08-02 .Blood Blood XXXX XXXX   No growth at 48 Hours    08-02 .Blood Blood XXXX XXXX   No growth at 48 Hours    07-31 .Blood Blood-Peripheral Blood Culture PCR  Proteus mirabilis   Growth in aerobic bottle: Gram Negative Rods   Growth in aerobic bottle: Proteus mirabilis  Direct identification is available within approximately 3-5  hours either by Blood Panel Multiplexed PCR or Direct  MALDI-TOF. Details: [email or web address]    07-31 .Blood Blood-Peripheral XXXX XXXX   No growth at 72 Hours                    Sodium:  Sodium: 142 mmol/L (08-03 @ 06:38)  Sodium: 142 mmol/L (08-02 @ 06:45)  Sodium: 144 mmol/L (08-01 @ 08:33)  Sodium: 144 mmol/L (07-31 @ 21:00)      0.57 mg/dL 08-03 @ 06:38  0.57 mg/dL 08-02 @ 06:45  0.79 mg/dL 08-01 @ 08:33  1.30 mg/dL 07-31 @ 21:00      Hemoglobin:  Hemoglobin: 9.0 g/dL (08-03 @ 06:38)  Hemoglobin: 9.6 g/dL (08-02 @ 06:45)  Hemoglobin: 11.2 g/dL (08-01 @ 08:33)  Hemoglobin: 12.2 g/dL (07-31 @ 21:00)      Platelets: Platelet Count - Automated: 149 K/uL (08-03 @ 06:38)  Platelet Count - Automated: 166 K/uL (08-02 @ 06:45)  Platelet Count - Automated: 206 K/uL (08-01 @ 08:33)  Platelet Count - Automated: 245 K/uL (07-31 @ 21:00)          Urinalysis Basic - ( 03 Aug 2024 06:38 )    Color: x / Appearance: x / SG: x / pH: x  Gluc: 88 mg/dL / Ketone: x  / Bili: x / Urobili: x   Blood: x / Protein: x / Nitrite: x   Leuk Esterase: x / RBC: x / WBC x   Sq Epi: x / Non Sq Epi: x / Bacteria: x        RADIOLOGY & ADDITIONAL STUDIES:      MICROBIOLOGY:  RECENT CULTURES:  08-02 .Blood Blood XXXX XXXX   No growth at 48 Hours    08-02 .Blood Blood XXXX XXXX   No growth at 48 Hours    07-31 .Blood Blood-Peripheral Blood Culture PCR  Proteus mirabilis   Growth in aerobic bottle: Gram Negative Rods   Growth in aerobic bottle: Proteus mirabilis  Direct identification is available within approximately 3-5  hours either by Blood Panel Multiplexed PCR or Direct  MALDI-TOF. Details: [email or web address]    07-31 .Blood Blood-Peripheral XXXX XXXX   No growth at 72 Hours

## 2024-08-04 NOTE — PROGRESS NOTE ADULT - SUBJECTIVE AND OBJECTIVE BOX
Patient is a 89y Male whom presented to the hospital with proteinurea     PAST MEDICAL & SURGICAL HISTORY:  CAD (coronary artery disease)      HTN (hypertension)      HLD (hyperlipidemia)      S/P CABG x 4      History of cholecystectomy          MEDICATIONS  (STANDING):  aspirin enteric coated 81 milliGRAM(s) Oral daily  atorvastatin 10 milliGRAM(s) Oral at bedtime  cefTRIAXone   IVPB 1000 milliGRAM(s) IV Intermittent every 24 hours  escitalopram 5 milliGRAM(s) Oral daily  heparin   Injectable 5000 Unit(s) SubCutaneous every 12 hours  metoprolol succinate ER 25 milliGRAM(s) Oral daily  pantoprazole    Tablet 40 milliGRAM(s) Oral before breakfast  sodium chloride 0.45%. 1000 milliLiter(s) (45 mL/Hr) IV Continuous <Continuous>  tamsulosin 0.4 milliGRAM(s) Oral at bedtime      Allergies    No Known Allergies    Intolerances        SOCIAL HISTORY:  Denies ETOh,Smoking,     FAMILY HISTORY:  No pertinent family history in first degree relatives        REVIEW OF SYSTEMS:    CONSTITUTIONAL: No weakness, fevers or chills  RESPIRATORY: No cough, wheezing, hemoptysis; No shortness of breath  CARDIOVASCULAR: No chest pain or palpitations                            9.4    7.35  )-----------( 161      ( 04 Aug 2024 06:54 )             28.1       CBC Full  -  ( 04 Aug 2024 06:54 )  WBC Count : 7.35 K/uL  RBC Count : 2.77 M/uL  Hemoglobin : 9.4 g/dL  Hematocrit : 28.1 %  Platelet Count - Automated : 161 K/uL  Mean Cell Volume : 101.4 fl  Mean Cell Hemoglobin : 33.9 pg  Mean Cell Hemoglobin Concentration : 33.5 gm/dL  Auto Neutrophil # : x  Auto Lymphocyte # : x  Auto Monocyte # : x  Auto Eosinophil # : x  Auto Basophil # : x  Auto Neutrophil % : x  Auto Lymphocyte % : x  Auto Monocyte % : x  Auto Eosinophil % : x  Auto Basophil % : x      08-04    142  |  108  |  12  ----------------------------<  93  3.6   |  29  |  0.55    Ca    8.4<L>      04 Aug 2024 06:54        CAPILLARY BLOOD GLUCOSE          Vital Signs Last 24 Hrs  T(C): 36.6 (04 Aug 2024 11:16), Max: 36.8 (03 Aug 2024 20:48)  T(F): 97.8 (04 Aug 2024 11:16), Max: 98.2 (03 Aug 2024 20:48)  HR: 69 (04 Aug 2024 11:16) (58 - 69)  BP: 142/63 (04 Aug 2024 11:16) (121/66 - 142/63)  BP(mean): --  RR: 19 (04 Aug 2024 11:16) (18 - 19)  SpO2: 95% (04 Aug 2024 11:16) (90% - 95%)    Parameters below as of 04 Aug 2024 11:16  Patient On (Oxygen Delivery Method): room air        Urinalysis Basic - ( 04 Aug 2024 06:54 )    Color: x / Appearance: x / SG: x / pH: x  Gluc: 93 mg/dL / Ketone: x  / Bili: x / Urobili: x   Blood: x / Protein: x / Nitrite: x   Leuk Esterase: x / RBC: x / WBC x   Sq Epi: x / Non Sq Epi: x / Bacteria: x              PHYSICAL EXAM:    Constitutional: NAD  Neck:  No JVD  Respiratory: CTAB  Cardiovascular: S1 and S2  Gastrointestinal: BS+, soft, NT/ND  Extremities: No peripheral edema

## 2024-08-05 VITALS
TEMPERATURE: 98 F | HEART RATE: 66 BPM | DIASTOLIC BLOOD PRESSURE: 66 MMHG | OXYGEN SATURATION: 93 % | RESPIRATION RATE: 18 BRPM | SYSTOLIC BLOOD PRESSURE: 144 MMHG

## 2024-08-05 LAB
ANION GAP SERPL CALC-SCNC: 4 MMOL/L — LOW (ref 5–17)
BUN SERPL-MCNC: 9 MG/DL — SIGNIFICANT CHANGE UP (ref 7–23)
CALCIUM SERPL-MCNC: 8.4 MG/DL — LOW (ref 8.5–10.1)
CHLORIDE SERPL-SCNC: 109 MMOL/L — HIGH (ref 96–108)
CO2 SERPL-SCNC: 29 MMOL/L — SIGNIFICANT CHANGE UP (ref 22–31)
CREAT SERPL-MCNC: 0.64 MG/DL — SIGNIFICANT CHANGE UP (ref 0.5–1.3)
EGFR: 90 ML/MIN/1.73M2 — SIGNIFICANT CHANGE UP
GLUCOSE SERPL-MCNC: 96 MG/DL — SIGNIFICANT CHANGE UP (ref 70–99)
HCT VFR BLD CALC: 28.8 % — LOW (ref 39–50)
HGB BLD-MCNC: 9.5 G/DL — LOW (ref 13–17)
MCHC RBC-ENTMCNC: 33 GM/DL — SIGNIFICANT CHANGE UP (ref 32–36)
MCHC RBC-ENTMCNC: 33.7 PG — SIGNIFICANT CHANGE UP (ref 27–34)
MCV RBC AUTO: 102.1 FL — HIGH (ref 80–100)
NRBC # BLD: 0 /100 WBCS — SIGNIFICANT CHANGE UP (ref 0–0)
PLATELET # BLD AUTO: 153 K/UL — SIGNIFICANT CHANGE UP (ref 150–400)
POTASSIUM SERPL-MCNC: 3.8 MMOL/L — SIGNIFICANT CHANGE UP (ref 3.5–5.3)
POTASSIUM SERPL-SCNC: 3.8 MMOL/L — SIGNIFICANT CHANGE UP (ref 3.5–5.3)
RBC # BLD: 2.82 M/UL — LOW (ref 4.2–5.8)
RBC # FLD: 15.5 % — HIGH (ref 10.3–14.5)
SODIUM SERPL-SCNC: 142 MMOL/L — SIGNIFICANT CHANGE UP (ref 135–145)
WBC # BLD: 6.83 K/UL — SIGNIFICANT CHANGE UP (ref 3.8–10.5)
WBC # FLD AUTO: 6.83 K/UL — SIGNIFICANT CHANGE UP (ref 3.8–10.5)

## 2024-08-05 PROCEDURE — 81001 URINALYSIS AUTO W/SCOPE: CPT

## 2024-08-05 PROCEDURE — 71250 CT THORAX DX C-: CPT | Mod: MC

## 2024-08-05 PROCEDURE — 96374 THER/PROPH/DIAG INJ IV PUSH: CPT

## 2024-08-05 PROCEDURE — 82728 ASSAY OF FERRITIN: CPT

## 2024-08-05 PROCEDURE — 83605 ASSAY OF LACTIC ACID: CPT

## 2024-08-05 PROCEDURE — 83540 ASSAY OF IRON: CPT

## 2024-08-05 PROCEDURE — 99285 EMERGENCY DEPT VISIT HI MDM: CPT

## 2024-08-05 PROCEDURE — 87086 URINE CULTURE/COLONY COUNT: CPT

## 2024-08-05 PROCEDURE — 83690 ASSAY OF LIPASE: CPT

## 2024-08-05 PROCEDURE — 87186 SC STD MICRODIL/AGAR DIL: CPT

## 2024-08-05 PROCEDURE — 74177 CT ABD & PELVIS W/CONTRAST: CPT | Mod: MC

## 2024-08-05 PROCEDURE — 82746 ASSAY OF FOLIC ACID SERUM: CPT

## 2024-08-05 PROCEDURE — 93306 TTE W/DOPPLER COMPLETE: CPT

## 2024-08-05 PROCEDURE — 85027 COMPLETE CBC AUTOMATED: CPT

## 2024-08-05 PROCEDURE — 82043 UR ALBUMIN QUANTITATIVE: CPT

## 2024-08-05 PROCEDURE — 99233 SBSQ HOSP IP/OBS HIGH 50: CPT

## 2024-08-05 PROCEDURE — 80076 HEPATIC FUNCTION PANEL: CPT

## 2024-08-05 PROCEDURE — 96375 TX/PRO/DX INJ NEW DRUG ADDON: CPT

## 2024-08-05 PROCEDURE — 87077 CULTURE AEROBIC IDENTIFY: CPT

## 2024-08-05 PROCEDURE — 87150 DNA/RNA AMPLIFIED PROBE: CPT

## 2024-08-05 PROCEDURE — 84550 ASSAY OF BLOOD/URIC ACID: CPT

## 2024-08-05 PROCEDURE — 84443 ASSAY THYROID STIM HORMONE: CPT

## 2024-08-05 PROCEDURE — 87040 BLOOD CULTURE FOR BACTERIA: CPT

## 2024-08-05 PROCEDURE — 85025 COMPLETE CBC W/AUTO DIFF WBC: CPT

## 2024-08-05 PROCEDURE — 71045 X-RAY EXAM CHEST 1 VIEW: CPT

## 2024-08-05 PROCEDURE — 80048 BASIC METABOLIC PNL TOTAL CA: CPT

## 2024-08-05 PROCEDURE — 97161 PT EVAL LOW COMPLEX 20 MIN: CPT

## 2024-08-05 PROCEDURE — 83735 ASSAY OF MAGNESIUM: CPT

## 2024-08-05 PROCEDURE — 83550 IRON BINDING TEST: CPT

## 2024-08-05 PROCEDURE — 82607 VITAMIN B-12: CPT

## 2024-08-05 PROCEDURE — 93005 ELECTROCARDIOGRAM TRACING: CPT

## 2024-08-05 PROCEDURE — 83036 HEMOGLOBIN GLYCOSYLATED A1C: CPT

## 2024-08-05 PROCEDURE — 92610 EVALUATE SWALLOWING FUNCTION: CPT

## 2024-08-05 PROCEDURE — 80053 COMPREHEN METABOLIC PANEL: CPT

## 2024-08-05 PROCEDURE — 36415 COLL VENOUS BLD VENIPUNCTURE: CPT

## 2024-08-05 RX ORDER — CEFPODOXIME PROXETIL 100 MG
1 TABLET ORAL
Qty: 0 | Refills: 0 | DISCHARGE
Start: 2024-08-05

## 2024-08-05 RX ORDER — ACETAMINOPHEN 500 MG
2 TABLET ORAL
Qty: 0 | Refills: 0 | DISCHARGE
Start: 2024-08-05

## 2024-08-05 RX ORDER — PANTOPRAZOLE SODIUM 20 MG/1
1 TABLET, DELAYED RELEASE ORAL
Qty: 0 | Refills: 0 | DISCHARGE
Start: 2024-08-05

## 2024-08-05 RX ADMIN — Medication 25 MILLIGRAM(S): at 06:21

## 2024-08-05 RX ADMIN — HEPARIN SODIUM 5000 UNIT(S): 1000 INJECTION, SOLUTION INTRAVENOUS; SUBCUTANEOUS at 06:21

## 2024-08-05 RX ADMIN — Medication 5 MILLIGRAM(S): at 12:37

## 2024-08-05 RX ADMIN — PANTOPRAZOLE SODIUM 40 MILLIGRAM(S): 20 TABLET, DELAYED RELEASE ORAL at 06:21

## 2024-08-05 RX ADMIN — Medication 200 MILLIGRAM(S): at 06:21

## 2024-08-05 RX ADMIN — Medication 81 MILLIGRAM(S): at 12:37

## 2024-08-05 NOTE — PHYSICAL THERAPY INITIAL EVALUATION ADULT - LEVEL OF INDEPENDENCE: SIT/STAND, REHAB EVAL
Unable to attain full, upright standing/moderate assist (50% patients effort)/maximum assist (25% patients effort)

## 2024-08-05 NOTE — DISCHARGE NOTE PROVIDER - NSDCCPCAREPLAN_GEN_ALL_CORE_FT
PRINCIPAL DISCHARGE DIAGNOSIS  Diagnosis: Acute UTI  Assessment and Plan of Treatment: 2/2 to chronic indwelling farris cathter poa      SECONDARY DISCHARGE DIAGNOSES  Diagnosis: Sepsis secondary to UTI  Assessment and Plan of Treatment:     Diagnosis: Malfunction of Farris catheter  Assessment and Plan of Treatment:     Diagnosis: Elevated lactic acid level  Assessment and Plan of Treatment:     Diagnosis: Bacteremia  Assessment and Plan of Treatment:     Diagnosis: Abnormal CT scan, esophagus  Assessment and Plan of Treatment:     Diagnosis: CAD (coronary artery disease)  Assessment and Plan of Treatment:     Diagnosis: Folate deficiency  Assessment and Plan of Treatment:     Diagnosis: Personal history of asbestosis  Assessment and Plan of Treatment:

## 2024-08-05 NOTE — DISCHARGE NOTE PROVIDER - HOSPITAL COURSE
Problem/Plan - 1:  ·  Problem: Malfunction of Farris catheter.   ·  Plan: Patient found to have wbc of 23 and lac 2.8, fluids and abx added 07/31 . Farris was attempted to flush by RN, unable to flush. Farris changed by ER provider , gross pyuria in Farris.  and ID consults called , iv abx started , septic workup sent , iv hydration ,serial lactate .Patient was ruled in for sepsis 2/2 to acute uti 2/2 to chronic indwelling Farris catheter , poa - repeat blood cx , ID cons informed , patient is on ceftriaxone.    Problem/Plan - 2:  ·  Problem: Acute UTI.   ·  Plan: 1. Sepsis due to UTI 2/2 to chronic indwelling farris cath poa .  2. Bacteremia with gram negative rods.  3. Chronic urinary retention with indwelling farris.    Problem/Plan - 3:  ·  Problem: Bacteremia.   ·  Plan: Patient was ruled in for sepsis 2/2 to acute uti 2/2 to chronic indwelling Farris catheter , poa - repeat blood cx , ID cons informed , patient is on ceftriaxone 1. Sepsis due to UTI 2/2 to chronic indwelling farris cath poa .  2. Bacteremia with gram negative rods.  3. Chronic urinary retention with indwelling farris.    Problem/Plan - 4:  ·  Problem: Elevated lactic acid level.   ·  Plan: 1. Sepsis due to UTI 2/2 to chronic indwelling farris cath poa .  2. Bacteremia with gram negative rods.  3. Chronic urinary retention with indwelling farris.    Problem/Plan - 5:  ·  Problem: Elevated WBCs.   ·  Plan: Admitted for septic workup and evaluation ,send blood and urine cx ,serial lactate levels ,monitor vitals closely hydration ,monitor urine output and renal profile, iv abx initiated Patient was ruled in for sepsis 2/2 to acute uti 2/2 to chronic indwelling Farris catheter , poa - repeat blood cx , ID cons informed , patient is on ceftriaxone.    Problem/Plan - 6:  ·  Problem: Abnormal CT scan, esophagus.   ·  Plan: likely 2/2 to esophagitis 2/2 to GERD & Hiatal hernia.    Problem/Plan - 7:  ·  Problem: CAD (coronary artery disease).   ·  Plan: continue home medications.    Problem/Plan - 8:  ·  Problem: Folate deficiency.   ·  Plan: supplement , serial cbc.    Problem/Plan - 9:  ·  Problem: Personal history of asbestosis.   ·  Plan: seen by pulm ,oxygen supply , nebulized bd.    Problem/Plan - 10:  ·  Problem: Prophylactic measure.   ·  Plan; Gastrointestinal stress ulcer prophylaxis and DVT prophylaxis administered.

## 2024-08-05 NOTE — PROGRESS NOTE ADULT - REASON FOR ADMISSION
leakage around Wright catheter
proteinurea
leakage around Wright catheter

## 2024-08-05 NOTE — PROGRESS NOTE ADULT - SUBJECTIVE AND OBJECTIVE BOX
PROGRESS NOTE  Patient is a 89y old  Male who presents with a chief complaint of leakage around Wright catheter (05 Aug 2024 12:22)      OVERNIGHT      HPI:  89-year-old male brought in by ambulance from [facility] for urine leaking around Wright as well as nausea and vomiting.  Patient states he started not feeling well yesterday, patient nauseous, several episodes of vomiting and diarrhea.  Associated with abdominal pain.  Denies fever, chills, chest pain, shortness of breath, cough, upper or lower extremity weakness or paresthesias.  Patient with chronic indwelling Wright. Found to have UTI and bacteremia Admitted for septic workup and evaluation ,send blood and urine cx ,serial lactate levels ,monitor vitals closely hydration ,monitor urine output and renal profile ,iv abx initiated Palliative care consult requested ,to discuss advance directives and complete MOLST  (01 Aug 2024 08:11)    PAST MEDICAL & SURGICAL HISTORY:  CAD (coronary artery disease)      HTN (hypertension)      HLD (hyperlipidemia)      DJD (degenerative joint disease)      Anemia of chronic disease      Enlarged prostate      H/O osteopenia      Urinary retention      Personal history of asbestosis      Hiatal hernia      Hiatal hernia with GERD and esophagitis      Indwelling urinary catheter present      S/P CABG x 4      History of cholecystectomy      H/O kyphoplasty      History of hip surgery      History of appendectomy          MEDICATIONS  (STANDING):  aspirin enteric coated 81 milliGRAM(s) Oral daily  atorvastatin 10 milliGRAM(s) Oral at bedtime  cefpodoxime 200 milliGRAM(s) Oral every 12 hours  escitalopram 5 milliGRAM(s) Oral daily  heparin   Injectable 5000 Unit(s) SubCutaneous every 12 hours  metoprolol succinate ER 25 milliGRAM(s) Oral daily  pantoprazole    Tablet 40 milliGRAM(s) Oral before breakfast  tamsulosin 0.4 milliGRAM(s) Oral at bedtime    MEDICATIONS  (PRN):  acetaminophen     Tablet .. 650 milliGRAM(s) Oral every 6 hours PRN Temp greater or equal to 38C (100.4F), Mild Pain (1 - 3)  aluminum hydroxide/magnesium hydroxide/simethicone Suspension 30 milliLiter(s) Oral every 4 hours PRN Dyspepsia  melatonin 3 milliGRAM(s) Oral at bedtime PRN Insomnia  ondansetron Injectable 4 milliGRAM(s) IV Push every 8 hours PRN Nausea and/or Vomiting      OBJECTIVE    T(C): 36.6 (08-05-24 @ 11:47), Max: 36.6 (08-05-24 @ 11:47)  HR: 59 (08-05-24 @ 11:47) (59 - 68)  BP: 109/60 (08-05-24 @ 11:47) (106/54 - 134/80)  RR: 18 (08-05-24 @ 11:47) (18 - 18)  SpO2: 93% (08-05-24 @ 11:47) (93% - 98%)  Wt(kg): --  I&O's Summary    04 Aug 2024 07:01  -  05 Aug 2024 07:00  --------------------------------------------------------  IN: 0 mL / OUT: 740 mL / NET: -740 mL          REVIEW OF SYSTEMS:  CONSTITUTIONAL: No fever, weight loss, or fatigue  EYES: No eye pain, visual disturbances, or discharge  ENMT:   No sinus or throat pain  NECK: No pain or stiffness  RESPIRATORY: No cough, wheezing, chills or hemoptysis; No shortness of breath  CARDIOVASCULAR: No chest pain, palpitations, dizziness, or leg swelling  GASTROINTESTINAL: No abdominal pain. No nausea, vomiting; No diarrhea or constipation. No melena or hematochezia.  GENITOURINARY: No dysuria, frequency, hematuria, or incontinence  NEUROLOGICAL: No headaches, memory loss, loss of strength, numbness, or tremors  SKIN: No itching, burning, rashes, or lesions   MUSCULOSKELETAL: No joint pain or swelling; No muscle, back, or extremity pain    PHYSICAL EXAM:  Appearance: NAD. VS past 24 hrs -as above   HEENT:   Moist oral mucosa. Conjunctiva clear b/l.   Neck : supple  Respiratory: Lungs CTAB.  Gastrointestinal:  Soft, nontender. No rebound. No rigidity. BS present	  Cardiovascular: RRR ,S1S2 present  Neurologic: Non-focal. Moving all extremities.  Extremities: No edema. No erythema. No calf tenderness.  Skin: No rashes, No ecchymoses, No cyanosis.	  wounds ,skin lesions-See skin assesment flow sheet   LABS:                        9.5    6.83  )-----------( 153      ( 05 Aug 2024 06:50 )             28.8     08-05    142  |  109<H>  |  9   ----------------------------<  96  3.8   |  29  |  0.64    Ca    8.4<L>      05 Aug 2024 06:50      CAPILLARY BLOOD GLUCOSE          Urinalysis Basic - ( 05 Aug 2024 06:50 )    Color: x / Appearance: x / SG: x / pH: x  Gluc: 96 mg/dL / Ketone: x  / Bili: x / Urobili: x   Blood: x / Protein: x / Nitrite: x   Leuk Esterase: x / RBC: x / WBC x   Sq Epi: x / Non Sq Epi: x / Bacteria: x        Culture - Blood (collected 02 Aug 2024 06:50)  Source: .Blood Blood  Preliminary Report (05 Aug 2024 09:01):    No growth at 72 Hours    Culture - Blood (collected 02 Aug 2024 06:45)  Source: .Blood Blood  Preliminary Report (05 Aug 2024 09:01):    No growth at 72 Hours    Culture - Blood (collected 31 Jul 2024 21:00)  Source: .Blood Blood-Peripheral  Gram Stain (01 Aug 2024 17:06):    Growth in aerobic bottle: Gram Negative Rods  Final Report (03 Aug 2024 10:17):    Growth in aerobic bottle: Proteus mirabilis    Direct identification is available within approximately 3-5    hours either by Blood Panel Multiplexed PCR or Direct    MALDI-TOF. Details: [email or web address]  Organism: Blood Culture PCR  Proteus mirabilis (03 Aug 2024 10:17)  Organism: Proteus mirabilis (03 Aug 2024 10:17)  Organism: Blood Culture PCR (03 Aug 2024 10:17)    Culture - Urine (collected 31 Jul 2024 21:00)  Source: Clean Catch Clean Catch (Midstream)  Preliminary Report (02 Aug 2024 08:20):    >100,000 CFU/ml Gram Negative Rods    Culture - Blood (collected 31 Jul 2024 20:50)  Source: .Blood Blood-Peripheral  Preliminary Report (05 Aug 2024 02:01):    No growth at 4 days      RADIOLOGY & ADDITIONAL TESTS:   reviewed elctronically  ASSESSMENT/PLAN: 	  Chart and available morning labs /imaging are reviewed electronically , urgent issues addressed . More information  is being added upon completion of rounds , when more information is collected and management discussed with consultants , medical staff and social service/case management on the floor No new issues reported by medical staff . All above noted   Patient was seen and examined on a day of discharge . Plan of care , discharge medications and recommendations discussed with consultants and clearance for discharge obtained .Social service , case management  and medical staff are aware of plan. Family is notified. Discharge summary  is  prepared electronically-see separate document prepared by me .75minutes spent on this visit, 50% visit time spent in care co-ordination with other attendings and counselling patient  I have discussed care plan with patient and HCP,expressed understanding of problems treatment and their effect and side effects, alternatives in detail,I have asked if they have any questions and concerns and appropriately addressed them to best of my ability

## 2024-08-05 NOTE — PHYSICAL THERAPY INITIAL EVALUATION ADULT - PERTINENT HX OF CURRENT PROBLEM, REHAB EVAL
As per H&P, patient was brought in by ambulance from [facility] for urine leaking around Wright as well as nausea and vomiting.  Patient states he started not feeling well yesterday, patient nauseous, several episodes of vomiting and diarrhea.  Associated with abdominal pain. Patient with chronic indwelling Wright.

## 2024-08-05 NOTE — DISCHARGE NOTE PROVIDER - CARE PROVIDER_API CALL
Philip Ramirez  Infectious Disease  [address] [place] [address]  Phone: [phone]  Fax: [phone]  Follow Up Time: 1 week    Danica Mary  Internal Medicine  [address] [place] [address]  Phone: [phone]  Fax: [phone]  Follow Up Time: 1-3 days    Erich Smith)  Pulmonary Disease  [address] [place] [id number]  Phone: [phone]  Fax: [phone]  Follow Up Time: 2 weeks

## 2024-08-05 NOTE — DISCHARGE NOTE NURSING/CASE MANAGEMENT/SOCIAL WORK - PATIENT PORTAL LINK FT
You can access the FollowMyHealth Patient Portal offered by [facility] by registering at the following website: [email or web address]. By joining [employer]’s FollowMyHealth portal, you will also be able to view your health information using other applications (apps) compatible with our system.

## 2024-08-05 NOTE — PROGRESS NOTE ADULT - ASSESSMENT
89-year-old male brought in by ambulance from [facility] for urine leaking around Wright as well as nausea and vomiting.  Patient states he started not feeling well yesterday, patient nauseous, several episodes of vomiting and diarrhea.  Associated with abdominal pain.  Denies fever, chills, chest pain, shortness of breath, cough, upper or lower extremity weakness or paresthesias.  Patient with chronic indwelling Wright.       proteiurea   will check ua , urine osmolality , urine sodium , urine uric acid , serum sodium , serum osmolality , serum uric acid , f/u with hyponatremia work up , f/u with bmp , monitor i and o    Admit for septic workup and ID evaluation,send blood and urine cx,serial lactate levels,monitor vitals closley,ivfs hydration,monitor urine output and renal profile,iv abx as per id cons  cefTRIAXone   IVPB 1000 milliGRAM(s) IV Intermittent every 24 hours  sodium chloride 0.45%. 1000 milliLiter(s) (45 mL/Hr) IV Continuous     BP monitoring,continue current antihypertensive meds, low salt diet,followup with PMD in 1-2 weeks  metoprolol succinate ER 25 milliGRAM(s) Oral daily    dvt heparin   Injectable 5000 Unit(s) SubCutaneous every 12 hours
89-year-old male brought in by ambulance from [facility] for urine leaking around Wright as well as nausea and vomiting.  Patient states he started not feeling well yesterday, patient nauseous, several episodes of vomiting and diarrhea.  Associated with abdominal pain.  Denies fever, chills, chest pain, shortness of breath, cough, upper or lower extremity weakness or paresthesias.  Patient with chronic indwelling Wright.       proteiurea   will check ua , urine osmolality , urine sodium , urine uric acid , serum sodium , serum osmolality , serum uric acid , f/u with hyponatremia work up , f/u with bmp , monitor i and o    Admit for septic workup and ID evaluation,send blood and urine cx,serial lactate levels,monitor vitals closley,ivfs hydration,monitor urine output and renal profile,iv abx as per id cons  cefTRIAXone   IVPB 1000 milliGRAM(s) IV Intermittent every 24 hours  sodium chloride 0.45%. 1000 milliLiter(s) (45 mL/Hr) IV Continuous     BP monitoring,continue current antihypertensive meds, low salt diet,followup with PMD in 1-2 weeks  metoprolol succinate ER 25 milliGRAM(s) Oral daily    dvt heparin   Injectable 5000 Unit(s) SubCutaneous every 12 hours
uti -= on rocephin  foleys cath   ashd   s/p cabg  hypertension  dyslipidemia  depression  
uti -= on rocephin  foleys cath   ashd   s/p cabg  hypertension  dyslipidemia  depression  
   REASON FOR VISIT  .. Management of problems listed below      ROS  . Patient unable to give ROS     PHYSICAL EXAM    HEENT Unremarkable  atraumatic   RESP Fair air entry  Harsh breath sound   CARDIAC S1 S2 No S3     NO JVD    ABDOMEN No hepatosplenomegaly   PEDAL EDEMA present No calf tenderness  NO rash       XXXXXXXXXXXXXXXXXXXXXXXXXXXXX  BEST PRACTICE ISSUES.  . HOB ELEVATN.    .... Yes  . DIET.   .... 8/1/2024 dash   . IV FLUIDS.  .... 8/1/2024 1/2 ns 45  . DVT PPLX.    .... 8/1/2024 hpsc   . STRESS ULCER PPLX.   .... 8/1/2024 PROTONIX 40   . INFECTION PPLX.   ....     XXXXXXXXXXXXXXXXXXXXXXXXX  GENERAL DATA .   [facility].    ..  8/1/2024 full code   ICU STAY.    .. no   COVID.   ..   ALLGY.   ..     nka   WT.   ..  8/1/2024 86  BMI.  .. 8/1/2024 26   ISOLATION.        XXXXXXXXXXXXXXXXXXXXXXX  VITALS/GAS EXCHANGE/DRIPS  ABGS.   .  VS/ PO/IO/ VENT/ DRIPS.  8/4/2024 afeb 62 100/50   8/4/2024 ra 95%    . HPI.  .... 8/1/2024  89 m from  [facility] for urine leaking around Davis as well as nausea and vomiting.   Associated with abdominal pain. Patient with chronic indwelling Davis.  .... PMH CAD CABG Hytn IDF   .... 8/1/2024 CT abd did not show obstrn Pt hadleukocytosis and left basal pleural calcifications and Pulm consulted     XXXXXXXXXXXXXXXXXXXXXXXXXXXXXXXXXXX  PROBLEM ASSESSMENT/RECOMMN/PLAN.  RESP.   . GAS EXCHANGE   .... target po 90-95%    . PLEURAL PLAQUE   .... No ho asbestos exposure per datr   .... 8/1/2024 ctap   ....... left basal pleural plaques   .... no intervention needed      . COUGH  .... 8/1/2024 robitiussin     INFECTION.  . CAUTI   .... w 8/1-8/2/2024 w 19 - 11   .... ua 7/31 w 45 r 11 n (+) le (+)   .... ctap ic 8/1/2024 cw 6/5/2023   ........ sternotomy   ........ coronary artery calcification   ....... left basal pleural plaques   ........ urinary bladder collapsed aroun davis   ........ no bowel obstrn   ....... kpp l4 l1 t12   .... bc 8/2 (-)  .... bc 7/31 proteus   .... uc 7/31 100k gnr   .... 8/1/2024 ROCEPHIN -> 8/4/2024 cefpodoxime   .... Follow cs     . PROTEUS BACTEREMIA 7/31     CARDIAC.  . LACTICEMIA   .... la 7/31-7/31 la 2.8-1.7    . CAD  .... 8/1 asa 81   .... 8/1/2024 ATORVASTAT 10  .... 8/1/2024 METOPROLOL 25     . DYSPHAGIA EVAL   .... 8/2/2024 speech surya easy chew mild thick    . VOMITING  .... 8/1/2024ONDASETRON      HEMAT.  .... Hb 8/1-8/2/2024 Hb 11.2 - 9.6   .... plt 8/1/2024 plt 206   .... monitor    GI.  .... LFTS 8/1/2024   ........ AP 65  ........ AST 18  ....... alt 26   .... monitor    . VOMITING  .... 8/1/2024 CTAP no obstrn     RENAL.  .... Na 8/1/2024 na 144   .... K 8/1/2024 K 4.5   .... CO2 8/1/2024 CO2 27   .... Cr 8/1/2024 Cr .7   .... monitor     . PHIMOSIS   .... 8/1/2024 Perdat needs dorsal slit surgery   .... 8/1/2024 davis needed to reelieve meatal obstrn several weeks ago  .... 8/2/2024 dw  to be managed after dc     . BPH  ....8/1/2024 TAMSULOSIN .4     ENDO.  .... tsh 8/1/2024 tsh 1.72     NEURO.  .... 8/1/2024 ESCITALOPRAM 5   .... 8/1/2024 MELATONIN 3 P     XXXXXXXXXXXXXXXXXXXXXX  OVERALL   . PROBLEMS 7/29/2024  PRESENTATION  .  . NAUSEA VOMITING 8/1/2024     . PROBLEMS 7/29/2024  HOSPITALIZATION  .  . CAUTI 8/1/2024  .... 8/1/2024 ROCEPHIN -> 8/4/2024 cefpodoxime   . BACTEREMIA PROTEUS 7/31   . CALCIFIED PLEURAL PLAQUE LEFT BASE 8/1/2024 CT   . VOMITING 8/1/2024   . PHIMOSIS   .... 8/1/2024 Perdat needs dorsal slit surgery   .... 8/1/2024 davis needed to reelieve meatal obstrn several weeks ago    . CHRONIC/POST DISCHARGE PROBLEMS  .  . INDWELLING DAVIS POA 7/31/2024   . CAD SP CABG       HOME O2 ELIGIBILITY.     . To be determined at time of discharge   . Will need home oxygen if ra rest or exertion pulse ox droops below 88    TIME SPENT.  . Over 36 minutes aggregate care time spent on encounter; activities included   direct patient care, counseling and/or coordinating care reviewing notes, lab data/ imaging , discussion with multidisciplinary team/ patient  /family and explaining in detail risks, benefits, alternatives  of the recommendations     SENIOR CARISSA ROJAS 89 m 7/31/2024 12/21/1933 DR MULUGETA PA   
   REASON FOR VISIT  .. Management of problems listed below      ROS  . Patient unable to give ROS     PHYSICAL EXAM    HEENT Unremarkable  atraumatic   RESP Fair air entry  Harsh breath sound   CARDIAC S1 S2 No S3     NO JVD    ABDOMEN No hepatosplenomegaly   PEDAL EDEMA present No calf tenderness  NO rash       XXXXXXXXXXXXXXXXXXXXXXXXXXXXX  BEST PRACTICE ISSUES.  . HOB ELEVATN.    .... Yes  . DIET.   .... 8/1/2024 dash   . IV FLUIDS.  .... 8/1/2024 1/2 ns 45  . DVT PPLX.    .... 8/1/2024 hpsc   . STRESS ULCER PPLX.   .... 8/1/2024 PROTONIX 40   . INFECTION PPLX.   ....     XXXXXXXXXXXXXXXXXXXXXXXXX  GENERAL DATA .   [facility].    ..  8/1/2024 full code   ICU STAY.    .. no   COVID.   ..   ALLGY.   ..     nka   WT.   ..  8/1/2024 86  BMI.  .. 8/1/2024 26   ISOLATION.        XXXXXXXXXXXXXXXXXXXXXXX  VITALS/GAS EXCHANGE/DRIPS  ABGS.   .  VS/ PO/IO/ VENT/ DRIPS.  8/2/2024 afeb 67 130/60   8/2/2024 ra 95%      . HPI.  .... 8/1/2024  89 m from  [facility] for urine leaking around Davis as well as nausea and vomiting.   Associated with abdominal pain. Patient with chronic indwelling Davis.  .... PMH CAD CABG Hytn IDF   .... 8/1/2024 CT abd did not show obstrn Pt hadleukocytosis and left basal pleural calcifications and Pulm consulted     XXXXXXXXXXXXXXXXXXXXXXXXXXXXXXXXXXX  PROBLEM ASSESSMENT/RECOMMN/PLAN.  RESP.   . GAS EXCHANGE   .... target po 90-95%    . PLEURAL PLAQUE   .... No ho asbestos exposure per datr   .... 8/1/2024 ctap   ....... left basal pleural plaques   .... no intervention needed      . COUGH  .... 8/1/2024 robitiussin     INFECTION.  . CAUTI   .... w 8/1-8/2/2024 w 19 - 11   .... ua 7/31 w 45 r 11 n (+) le (+)   .... ctap ic 8/1/2024 cw 6/5/2023   ........ sternotomy   ........ coronary artery calcification   ....... left basal pleural plaques   ........ urinary bladder collapsed aroun davis   ........ no bowel obstrn   ....... kpp l4 l1 t12   .... 8/1/2024 ROCEPHIN  .... Follow cs     . PROTEUS BACTEREMIA 7/31     CARDIAC.  . LACTICEMIA   .... la 7/31-7/31 la 2.8-1.7    . CAD  .... 8/1/2024 ATORVASTAT 10  .... 8/1/2024 METOPROLOL 25     . DYSPHAGIA EVAL   .... 8/2/2024 speech surya easy chew mild thick    . VOMITING  .... 8/1/2024ONDASETRON      HEMAT.  .... Hb 8/1-8/2/2024 Hb 11.2 - 9.6   .... plt 8/1/2024 plt 206   .... monitor    GI.  .... LFTS 8/1/2024   ........ AP 65  ........ AST 18  ....... alt 26   .... monitor    . VOMITING  .... 8/1/2024 CTAP no obstrn     RENAL.  .... Na 8/1/2024 na 144   .... K 8/1/2024 K 4.5   .... CO2 8/1/2024 CO2 27   .... Cr 8/1/2024 Cr .7   .... monitor     . PHIMOSIS   .... 8/1/2024 Perdat needs dorsal slit surgery   .... 8/1/2024 kaleigh needed to reelieve meatal obstrn several weeks ago  .... 8/2/2024 dw  to be managed after dc     . BPH  ....8/1/2024 TAMSULOSIN .4     ENDO.  .... tsh 8/1/2024 tsh 1.72     NEURO.  .... 8/1/2024 ESCITALOPRAM 5   .... 8/1/2024 MELATONIN 3 P     XXXXXXXXXXXXXXXXXXXXXX  OVERALL   . PROBLEMS 7/29/2024  PRESENTATION  .  . NAUSEA VOMITING 8/1/2024     . PROBLEMS 7/29/2024  HOSPITALIZATION  .  . CAUTI 8/1/2024  .... 8/1/2024 Rocephin   . BACTEREMIA PROTEUS 7/31   . CALCIFIED PLEURAL PLAQUE LEFT BASE 8/1/2024 CT   . VOMITING 8/1/2024   . PHIMOSIS   .... 8/1/2024 Perdat needs dorsal slit surgery   .... 8/1/2024 davis needed to reelieve meatal obstrn several weeks ago    . CHRONIC/POST DISCHARGE PROBLEMS  .  . INDWELLING DAVIS POA 7/31/2024   . CAD SP CABG       HOME O2 ELIGIBILITY.     . To be determined at time of discharge   . Will need home oxygen if ra rest or exertion pulse ox droops below 88    TIME SPENT.  . Over 36 minutes aggregate care time spent on encounter; activities included   direct patient care, counseling and/or coordinating care reviewing notes, lab data/ imaging , discussion with multidisciplinary team/ patient  /family and explaining in detail risks, benefits, alternatives  of the recommendations     SENIOR CARISSA Danielson m 7/31/2024 12/21/1933 DR MULUGETA PA .  
   REASON FOR VISIT  .. Management of problems listed below      ROS  . Patient unable to give ROS     PHYSICAL EXAM    HEENT Unremarkable  atraumatic   RESP Fair air entry  Harsh breath sound   CARDIAC S1 S2 No S3     NO JVD    ABDOMEN No hepatosplenomegaly   PEDAL EDEMA present No calf tenderness  NO rash       XXXXXXXXXXXXXXXXXXXXXXXXXXXXX  BEST PRACTICE ISSUES.  . HOB ELEVATN.    .... Yes  . DIET.   .... 8/1/2024 dash   . IV FLUIDS.  .... 8/1/2024 1/2 ns 45  . DVT PPLX.    .... 8/1/2024 hpsc   . STRESS ULCER PPLX.   .... 8/1/2024 PROTONIX 40   . INFECTION PPLX.   ....     XXXXXXXXXXXXXXXXXXXXXXXXX  GENERAL DATA .   [facility].    ..  8/1/2024 full code   ICU STAY.    .. no   COVID.   ..   ALLGY.   ..     nka   WT.   ..  8/1/2024 86  BMI.  .. 8/1/2024 26   ISOLATION.        XXXXXXXXXXXXXXXXXXXXXXX  VITALS/GAS EXCHANGE/DRIPS  ABGS.   .  VS/ PO/IO/ VENT/ DRIPS.  8/3/2024 afeb 65 130/80   8/3/2024 ra 90%     . HPI.  .... 8/1/2024  89 m from  [facility] for urine leaking around Davis as well as nausea and vomiting.   Associated with abdominal pain. Patient with chronic indwelling Davis.  .... PMH CAD CABG Hytn IDF   .... 8/1/2024 CT abd did not show obstrn Pt hadleukocytosis and left basal pleural calcifications and Pulm consulted     XXXXXXXXXXXXXXXXXXXXXXXXXXXXXXXXXXX  PROBLEM ASSESSMENT/RECOMMN/PLAN.  RESP.   . GAS EXCHANGE   .... target po 90-95%    . PLEURAL PLAQUE   .... No ho asbestos exposure per datr   .... 8/1/2024 ctap   ....... left basal pleural plaques   .... no intervention needed      . COUGH  .... 8/1/2024 robitiussin     INFECTION.  . CAUTI   .... w 8/1-8/2/2024 w 19 - 11   .... ua 7/31 w 45 r 11 n (+) le (+)   .... ctap ic 8/1/2024 cw 6/5/2023   ........ sternotomy   ........ coronary artery calcification   ....... left basal pleural plaques   ........ urinary bladder collapsed aroun davis   ........ no bowel obstrn   ....... kpp l4 l1 t12   .... bc 8/2 (-)  .... bc 7/31vproteus   .... uc 7/31 100k gnr   .... 8/1/2024 ROCEPHIN  .... Follow cs     . PROTEUS BACTEREMIA 7/31     CARDIAC.  . LACTICEMIA   .... la 7/31-7/31 la 2.8-1.7    . CAD  .... 8/1 asa 81   .... 8/1/2024 ATORVASTAT 10  .... 8/1/2024 METOPROLOL 25     . DYSPHAGIA EVAL   .... 8/2/2024 speech surya easy chew mild thick    . VOMITING  .... 8/1/2024ONDASETRON      HEMAT.  .... Hb 8/1-8/2/2024 Hb 11.2 - 9.6   .... plt 8/1/2024 plt 206   .... monitor    GI.  .... LFTS 8/1/2024   ........ AP 65  ........ AST 18  ....... alt 26   .... monitor    . VOMITING  .... 8/1/2024 CTAP no obstrn     RENAL.  .... Na 8/1/2024 na 144   .... K 8/1/2024 K 4.5   .... CO2 8/1/2024 CO2 27   .... Cr 8/1/2024 Cr .7   .... monitor     . PHIMOSIS   .... 8/1/2024 Perdat needs dorsal slit surgery   .... 8/1/2024 davis needed to reelieve meatal obstrn several weeks ago  .... 8/2/2024 dw  to be managed after dc     . BPH  ....8/1/2024 TAMSULOSIN .4     ENDO.  .... tsh 8/1/2024 tsh 1.72     NEURO.  .... 8/1/2024 ESCITALOPRAM 5   .... 8/1/2024 MELATONIN 3 P     XXXXXXXXXXXXXXXXXXXXXX  OVERALL   . PROBLEMS 7/29/2024  PRESENTATION  .  . NAUSEA VOMITING 8/1/2024     . PROBLEMS 7/29/2024  HOSPITALIZATION  .  . CAUTI 8/1/2024  .... 8/1/2024 Rocephin   . BACTEREMIA PROTEUS 7/31   . CALCIFIED PLEURAL PLAQUE LEFT BASE 8/1/2024 CT   . VOMITING 8/1/2024   . PHIMOSIS   .... 8/1/2024 Perdat needs dorsal slit surgery   .... 8/1/2024 davis needed to reelieve meatal obstrn several weeks ago    . CHRONIC/POST DISCHARGE PROBLEMS  .  . INDWELLING DAVIS POA 7/31/2024   . CAD SP CABG       HOME O2 ELIGIBILITY.     . To be determined at time of discharge   . Will need home oxygen if ra rest or exertion pulse ox droops below 88    TIME SPENT.  . Over 36 minutes aggregate care time spent on encounter; activities included   direct patient care, counseling and/or coordinating care reviewing notes, lab data/ imaging , discussion with multidisciplinary team/ patient  /family and explaining in detail risks, benefits, alternatives  of the recommendations     SENIOR CARISSA ROJAS 89 m 7/31/2024 12/21/1933 DR MULUGETA PA   
   REASON FOR VISIT  .. Management of problems listed below      ROS  . Patient unable to give ROS     PHYSICAL EXAM    HEENT Unremarkable  atraumatic   RESP Fair air entry  Harsh breath sound   CARDIAC S1 S2 No S3     NO JVD    ABDOMEN No hepatosplenomegaly   PEDAL EDEMA present No calf tenderness  NO rash       XXXXXXXXXXXXXXXXXXXXXXXXXXXXX  BEST PRACTICE ISSUES.  . HOB ELEVATN.    .... Yes  . DIET.   .... 8/1/2024 dash   . IV FLUIDS.  .... 8/1/2024 1/2 ns 45  . DVT PPLX.    .... 8/1/2024 hpsc   . STRESS ULCER PPLX.   .... 8/1/2024 PROTONIX 40   . INFECTION PPLX.   ....     XXXXXXXXXXXXXXXXXXXXXXXXX  GENERAL DATA .   [facility].    ..  8/1/2024 full code   ICU STAY.    .. no   COVID.   ..   ALLGY.   ..     nka   WT.   ..  8/1/2024 86  BMI.  .. 8/1/2024 26   ISOLATION.        . HPI.  .... 8/1/2024  89 m from  [facility] for urine leaking around Davis as well as nausea and vomiting.   Associated with abdominal pain. Patient with chronic indwelling Davis.  .... PMH CAD CABG Hytn IDF   .... 8/1/2024 CT abd did not show obstrn Pt hadleukocytosis and left basal pleural calcifications and Pulm consulted     XXXXXXXXXXXXXXXXXXXXXXXXXXXXXXXXXXX  PROBLEM ASSESSMENT/RECOMMN/PLAN.  RESP.   . GAS EXCHANGE   .... target po 90-95%    . PLEURAL PLAQUE   .... No ho asbestos exposure per datr   .... 8/1/2024 ctap   ....... left basal pleural plaques   .... no intervention needed      . COUGH  .... 8/1/2024 robitiussin     INFECTION.  . CAUTI   .... w 8/1-8/2/2024 w 19 - 11   .... ua 7/31 w 45 r 11 n (+) le (+)   .... ctap ic 8/1/2024 cw 6/5/2023   ........ sternotomy   ........ coronary artery calcification   ....... left basal pleural plaques   ........ urinary bladder collapsed aroun davis   ........ no bowel obstrn   ....... kpp l4 l1 t12   .... bc 8/2 (-)  .... bc 7/31 proteus   .... uc 7/31 100k gnr   .... 8/1/2024 ROCEPHIN -> 8/4/2024 cefpodoxime   .... Follow cs     . PROTEUS BACTEREMIA 7/31     CARDIAC.  . LACTICEMIA   .... la 7/31-7/31 la 2.8-1.7    . CAD  .... 8/1 asa 81   .... 8/1/2024 ATORVASTAT 10  .... 8/1/2024 METOPROLOL 25     . DYSPHAGIA EVAL   .... 8/2/2024 speech surya easy chew mild thick    . VOMITING  .... 8/1/2024ONDASETRON      HEMAT.  .... Hb 8/1-8/2/2024 Hb 11.2 - 9.6   .... plt 8/1/2024 plt 206   .... monitor    GI.  .... LFTS 8/1/2024   ........ AP 65  ........ AST 18  ....... alt 26   .... monitor    . VOMITING  .... 8/1/2024 CTAP no obstrn     RENAL.  .... Na 8/1/2024 na 144   .... K 8/1/2024 K 4.5   .... CO2 8/1/2024 CO2 27   .... Cr 8/1/2024 Cr .7   .... monitor     . PHIMOSIS   .... 8/1/2024 Perdat needs dorsal slit surgery   .... 8/1/2024 davis needed to reelieve meatal obstrn several weeks ago  .... 8/2/2024 dw  to be managed after dc     . BPH  ....8/1/2024 TAMSULOSIN .4     ENDO.  .... tsh 8/1/2024 tsh 1.72     NEURO.  .... 8/1/2024 ESCITALOPRAM 5   .... 8/1/2024 MELATONIN 3 P     XXXXXXXXXXXXXXXXXXXXXX  OVERALL   . PROBLEMS 7/29/2024  PRESENTATION  .  . NAUSEA VOMITING 8/1/2024     . PROBLEMS 7/29/2024  HOSPITALIZATION  .  . CAUTI 8/1/2024  .... 8/1/2024 ROCEPHIN -> 8/4/2024 cefpodoxime   . BACTEREMIA PROTEUS 7/31   . CALCIFIED PLEURAL PLAQUE LEFT BASE 8/1/2024 CT   . VOMITING 8/1/2024   . PHIMOSIS   .... 8/1/2024 Perdat needs dorsal slit surgery   .... 8/1/2024 davis needed to reelieve meatal obstrn several weeks ago    . CHRONIC/POST DISCHARGE PROBLEMS  .  . INDWELLING DAVIS POA 7/31/2024   . CAD SP CABG       HOME O2 ELIGIBILITY.     . To be determined at time of discharge   . Will need home oxygen if ra rest or exertion pulse ox droops below 88    TIME SPENT.  . Over 36 minutes aggregate care time spent on encounter; activities included   direct patient care, counseling and/or coordinating care reviewing notes, lab data/ imaging , discussion with multidisciplinary team/ patient  /family and explaining in detail risks, benefits, alternatives  of the recommendations     SENIOR CARISSA escobar 7/31[birth year]2024 12/21/1933 DR MULUGETA PA   
89-year-old male brought in by ambulance from [facility] for urine leaking around Wright as well as nausea and vomiting.  Patient states he started not feeling well yesterday, patient nauseous, several episodes of vomiting and diarrhea.  Associated with abdominal pain.  Denies fever, chills, chest pain, shortness of breath, cough, upper or lower extremity weakness or paresthesias.  Patient with chronic indwelling Wright.       proteiurea   will check ua , urine osmolality , urine sodium , urine uric acid , serum sodium , serum osmolality , serum uric acid , f/u with proteinurea work up , f/u with bmp , monitor i and o  sodium chloride 0.45%. 1000 milliLiter(s) (45 mL/Hr) IV Continuous <Continuous>  tamsulosin 0.4 milliGRAM(s) Oral at bedtime    Admit for septic workup and ID evaluation,send blood and urine cx,serial lactate levels,monitor vitals closley,ivfs hydration,monitor urine output and renal profile,iv abx as per id cons  cefTRIAXone   IVPB 1000 milliGRAM(s) IV Intermittent every 24 hours  sodium chloride 0.45%. 1000 milliLiter(s) (45 mL/Hr) IV Continuous     BP monitoring,continue current antihypertensive meds, low salt diet,followup with PMD in 1-2 weeks  metoprolol succinate ER 25 milliGRAM(s) Oral daily    dvt heparin   Injectable 5000 Unit(s) SubCutaneous every 12 hours
89-year-old male brought in by ambulance from [facility] for urine leaking around Wright as well as nausea and vomiting.  Patient states he started not feeling well yesterday, patient nauseous, several episodes of vomiting and diarrhea.  Associated with abdominal pain.  Denies fever, chills, chest pain, shortness of breath, cough, upper or lower extremity weakness or paresthesias.  Patient with chronic indwelling Wright.       proteiurea   will check ua , urine osmolality , urine sodium , urine uric acid , serum sodium , serum osmolality , serum uric acid , f/u with hyponatremia work up , f/u with bmp , monitor i and o    Admit for septic workup and ID evaluation,send blood and urine cx,serial lactate levels,monitor vitals closley,ivfs hydration,monitor urine output and renal profile,iv abx as per id cons  cefTRIAXone   IVPB 1000 milliGRAM(s) IV Intermittent every 24 hours  sodium chloride 0.45%. 1000 milliLiter(s) (45 mL/Hr) IV Continuous     BP monitoring,continue current antihypertensive meds, low salt diet,followup with PMD in 1-2 weeks  metoprolol succinate ER 25 milliGRAM(s) Oral daily    dvt heparin   Injectable 5000 Unit(s) SubCutaneous every 12 hours
uti -= on rocephin  foleys cath   ashd   s/p cabg  hypertension  dyslipidemia  depression  
uti -= on rocephin  foleys cath   ashd   s/p cabg  hypertension  dyslipidemia  depression  
89-year-old male brought in by ambulance from [facility] for urine leaking around Wright as well as nausea and vomiting.  Patient states he started not feeling well yesterday, patient nauseous, several episodes of vomiting and diarrhea.  Associated with abdominal pain.  Denies fever, chills, chest pain, shortness of breath, cough, upper or lower extremity weakness or paresthesias.  Patient with chronic indwelling Wright. Found to have UTI and bacteremia Admitted for septic workup and evaluation ,send blood and urine cx ,serial lactate levels ,monitor vitals closely hydration ,monitor urine output and renal profile ,iv abx initiated Palliative care consult requested ,to discuss advance directives and complete MOLST 
89-year-old male brought in by ambulance from [facility] for urine leaking around Wright as well as nausea and vomiting.  Patient states he started not feeling well yesterday, patient nauseous, several episodes of vomiting and diarrhea.  Associated with abdominal pain.  Denies fever, chills, chest pain, shortness of breath, cough, upper or lower extremity weakness or paresthesias.  Patient with chronic indwelling Wright. Found to have UTI and bacteremia Admitted for septic workup and evaluation ,send blood and urine cx ,serial lactate levels ,monitor vitals closely hydration ,monitor urine output and renal profile ,iv abx initiated Palliative care consult requested ,to discuss advance directives and complete MOLST

## 2024-08-05 NOTE — DISCHARGE NOTE PROVIDER - NSDCMRMEDTOKEN_GEN_ALL_CORE_FT
acetaminophen 325 mg oral tablet: 2 tab(s) orally every 6 hours As needed Temp greater or equal to 38C (100.4F), Mild Pain (1 - 3)  ascorbic acid 500 mg oral tablet: 2 tab(s) orally once a day  aspirin 81 mg oral delayed release tablet: 1 tab(s) orally once a day  atorvastatin 20 mg oral tablet: 1 tab(s) orally once a day (at bedtime)  cefpodoxime 200 mg oral tablet: 1 tab(s) orally every 12 hours x 7 days  cyanocobalamin 1000 mcg oral tablet: 1 tab(s) orally once a day  Dulcolax Laxative 10 mg rectal suppository: 1 suppository(ies) rectally as needed for  constipation  escitalopram 10 mg oral tablet: 1 tab(s) orally once a day  ferrous sulfate 325 mg (65 mg elemental iron) oral tablet: 1 tab(s) orally once a day  finasteride 5 mg oral tablet: 1 tab(s) orally once a day  Fleet Enema 19 g-7 g rectal enema: 133 milliliter(s) rectally as needed for  constipation  melatonin 5 mg oral tablet: 1 tab(s) orally once a day  Metoprolol Succinate ER 25 mg oral tablet, extended release: 1 tab(s) orally once a day  Milk of Magnesia 1200 mg/15 mL oral liquid: 30 milliliter(s) orally once a day as needed for  constipation  Multiple Vitamins with Minerals oral tablet: 1 tab(s) orally once a day  ondansetron 4 mg oral tablet: 1 tab(s) orally every 8 hours  pantoprazole 40 mg oral delayed release tablet: 1 tab(s) orally once a day (before a meal)  tamsulosin 0.4 mg oral capsule: 1 cap(s) orally once a day (in the evening)

## 2024-08-05 NOTE — PHYSICAL THERAPY INITIAL EVALUATION ADULT - DIAGNOSIS, PT EVAL
UTI; Bacteremia; Esophagitis; R/O Sepsis; decreased functional mobility due to generalized weakness and deconditioning; loss of  balance

## 2024-08-05 NOTE — SOCIAL WORK PROGRESS NOTE - NSSWPROGRESSNOTE_GEN_ALL_CORE
Discussed today at rounds and with MD who is clearing patient for d/c back to SNF today. Referral sent to [facility] and I called and confirned they can accept patient back today. Requested ambulance thru [facility] [phone] for 3PM today. Nurse on unit aware of d/c time. I spoke with wife Jacklyn by phone and she is in agreement with d/c today.

## 2024-08-05 NOTE — PROGRESS NOTE ADULT - SUBJECTIVE AND OBJECTIVE BOX
Patient is a 89y Male with a known history of :  Malfunction of Wright catheter [T83.011A]    Acute UTI [N39.0]    Bacteremia [R78.81]    Personal history of asbestosis [Z87.09]    Prophylactic measure [Z29.9]    CAD (coronary artery disease) [I25.10]    Abnormal CT scan, esophagus [R93.3]    Elevated lactic acid level [R79.89]    Elevated WBCs [D72.829]    Folate deficiency [E53.8]      HPI:  89-year-old male brought in by ambulance from [facility] for urine leaking around Wright as well as nausea and vomiting.  Patient states he started not feeling well yesterday, patient nauseous, several episodes of vomiting and diarrhea.  Associated with abdominal pain.  Denies fever, chills, chest pain, shortness of breath, cough, upper or lower extremity weakness or paresthesias.  Patient with chronic indwelling Wright. Found to have UTI and bacteremia Admitted for septic workup and evaluation ,send blood and urine cx ,serial lactate levels ,monitor vitals closely hydration ,monitor urine output and renal profile ,iv abx initiated Palliative care consult requested ,to discuss advance directives and complete MOLST  (01 Aug 2024 08:11)      REVIEW OF SYSTEMS:    CONSTITUTIONAL: No fever, weight loss, or fatigue  EYES: No eye pain, visual disturbances, or discharge  ENMT:  No difficulty hearing, tinnitus, vertigo; No sinus or throat pain  NECK: No pain or stiffness  BREASTS: No pain, masses, or nipple discharge  RESPIRATORY: No cough, wheezing, chills or hemoptysis; No shortness of breath  CARDIOVASCULAR: No chest pain, palpitations, dizziness, or leg swelling  GASTROINTESTINAL: No abdominal or epigastric pain. No nausea, vomiting, or hematemesis; No diarrhea or constipation. No melena or hematochezia.  GENITOURINARY: No dysuria, frequency, hematuria, or incontinence  NEUROLOGICAL: No headaches, memory loss, loss of strength, numbness, or tremors  SKIN: No itching, burning, rashes, or lesions   LYMPH NODES: No enlarged glands  ENDOCRINE: No heat or cold intolerance; No hair loss  MUSCULOSKELETAL: No joint pain or swelling; No muscle, back, or extremity pain  PSYCHIATRIC: No depression, anxiety, mood swings, or difficulty sleeping  HEME/LYMPH: No easy bruising, or bleeding gums  ALLERGY AND IMMUNOLOGIC: No hives or eczema    MEDICATIONS  (STANDING):  aspirin enteric coated 81 milliGRAM(s) Oral daily  atorvastatin 10 milliGRAM(s) Oral at bedtime  cefpodoxime 200 milliGRAM(s) Oral every 12 hours  escitalopram 5 milliGRAM(s) Oral daily  heparin   Injectable 5000 Unit(s) SubCutaneous every 12 hours  metoprolol succinate ER 25 milliGRAM(s) Oral daily  pantoprazole    Tablet 40 milliGRAM(s) Oral before breakfast  sodium chloride 0.45%. 1000 milliLiter(s) (45 mL/Hr) IV Continuous <Continuous>  tamsulosin 0.4 milliGRAM(s) Oral at bedtime    MEDICATIONS  (PRN):  acetaminophen     Tablet .. 650 milliGRAM(s) Oral every 6 hours PRN Temp greater or equal to 38C (100.4F), Mild Pain (1 - 3)  aluminum hydroxide/magnesium hydroxide/simethicone Suspension 30 milliLiter(s) Oral every 4 hours PRN Dyspepsia  melatonin 3 milliGRAM(s) Oral at bedtime PRN Insomnia  ondansetron Injectable 4 milliGRAM(s) IV Push every 8 hours PRN Nausea and/or Vomiting      ALLERGIES: No Known Allergies      FAMILY HISTORY:  No pertinent family history in first degree relatives        PHYSICAL EXAMINATION:  -----------------------------  T(C): 36.4 (08-05-24 @ 04:02), Max: 36.6 (08-04-24 @ 11:16)  HR: 68 (08-05-24 @ 04:02) (62 - 69)  BP: 134/80 (08-05-24 @ 04:02) (106/54 - 142/63)  RR: 18 (08-05-24 @ 04:02) (18 - 19)  SpO2: 98% (08-05-24 @ 04:02) (93% - 98%)  Wt(kg): --    08-04 @ 07:01  -  08-05 @ 07:00  --------------------------------------------------------  IN:  Total IN: 0 mL    OUT:    Indwelling Catheter - Urethral (mL): 740 mL  Total OUT: 740 mL    Total NET: -740 mL            VITALS  T(C): 36.4 (08-05-24 @ 04:02), Max: 36.6 (08-04-24 @ 11:16)  HR: 68 (08-05-24 @ 04:02) (62 - 69)  BP: 134/80 (08-05-24 @ 04:02) (106/54 - 142/63)  RR: 18 (08-05-24 @ 04:02) (18 - 19)  SpO2: 98% (08-05-24 @ 04:02) (93% - 98%)    Constitutional: well developed, normal appearance, well groomed, well nourished, no deformities and no acute distress.   Eyes: the conjunctiva exhibited no abnormalities and the eyelids demonstrated no xanthelasmas.   HEENT: normal oral mucosa, no oral pallor and no oral cyanosis.   Neck: normal jugular venous A waves present, normal jugular venous V waves present and no jugular venous velazquez A waves.   Pulmonary: no respiratory distress, normal respiratory rhythm and effort, no accessory muscle use and lungs were clear to auscultation bilaterally.   Cardiovascular: heart rate and rhythm were normal, normal S1 and S2 and no murmur, gallop, rub, heave or thrill are present.   Abdomen: soft, non-tender, no hepato-splenomegaly and no abdominal mass palpated.   Musculoskeletal: the gait could not be assessed..   Extremities: no clubbing of the fingernails, no localized cyanosis, no petechial hemorrhages and no ischemic changes.   Skin: normal skin color and pigmentation, no rash, no venous stasis, no skin lesions, no skin ulcer and no xanthoma was observed.   Psychiatric: oriented to person, place, and time, the affect was normal, the mood was normal and not feeling anxious.     LABS:   --------  08-05    142  |  109<H>  |  9   ----------------------------<  96  3.8   |  29  |  0.64    Ca    8.4<L>      05 Aug 2024 06:50                           9.5    6.83  )-----------( 153      ( 05 Aug 2024 06:50 )             28.8                 RADIOLOGY:  -----------------    ECG:     ECHO:

## 2024-08-05 NOTE — PROGRESS NOTE ADULT - SUBJECTIVE AND OBJECTIVE BOX
CHIEF COMPLAINT/ REASON FOR VISIT  .. Patient was seen to address the  issue listed under PROBLEM LIST which is located toward bottom of this note     CARISSA CUNNINGHAM    PLV [facility]EL[facility] [address]    Allergies    No Known Allergies    Intolerances        PAST MEDICAL & SURGICAL HISTORY:  CAD (coronary artery disease)      HTN (hypertension)      HLD (hyperlipidemia)      DJD (degenerative joint disease)      Anemia of chronic disease      Enlarged prostate      H/O osteopenia      Urinary retention      Personal history of asbestosis      Hiatal hernia      Hiatal hernia with GERD and esophagitis      Indwelling urinary catheter present      S/P CABG x 4      History of cholecystectomy      H/O kyphoplasty      History of hip surgery      History of appendectomy          FAMILY HISTORY:  No pertinent family history in first degree relatives        Home Medications:  ascorbic acid 500 mg oral tablet: 2 tab(s) orally once a day (01 Aug 2024 11:04)  aspirin 81 mg oral delayed release tablet: 1 tab(s) orally once a day (01 Aug 2024 11:04)  atorvastatin 20 mg oral tablet: 1 tab(s) orally once a day (at bedtime) (01 Aug 2024 11:04)  cyanocobalamin 1000 mcg oral tablet: 1 tab(s) orally once a day (01 Aug 2024 11:04)  Dulcolax Laxative 10 mg rectal suppository: 1 suppository(ies) rectally as needed for  constipation (01 Aug 2024 11:04)  escitalopram 10 mg oral tablet: 1 tab(s) orally once a day (01 Aug 2024 11:04)  ferrous sulfate 325 mg (65 mg elemental iron) oral tablet: 1 tab(s) orally once a day (01 Aug 2024 11:04)  finasteride 5 mg oral tablet: 1 tab(s) orally once a day (01 Aug 2024 11:04)  Fleet Enema 19 g-7 g rectal enema: 133 milliliter(s) rectally as needed for  constipation (01 Aug 2024 11:04)  melatonin 5 mg oral tablet: 1 tab(s) orally once a day (01 Aug 2024 11:04)  Metoprolol Succinate ER 25 mg oral tablet, extended release: 1 tab(s) orally once a day (01 Aug 2024 11:04)  Milk of Magnesia 1200 mg/15 mL oral liquid: 30 milliliter(s) orally once a day as needed for  constipation (01 Aug 2024 11:04)  Multiple Vitamins with Minerals oral tablet: 1 tab(s) orally once a day (01 Aug 2024 11:04)  ondansetron 4 mg oral tablet: 1 tab(s) orally every 8 hours (01 Aug 2024 11:03)  tamsulosin 0.4 mg oral capsule: 1 cap(s) orally once a day (in the evening) (01 Aug 2024 11:04)      MEDICATIONS  (STANDING):  aspirin enteric coated 81 milliGRAM(s) Oral daily  atorvastatin 10 milliGRAM(s) Oral at bedtime  cefpodoxime 200 milliGRAM(s) Oral every 12 hours  escitalopram 5 milliGRAM(s) Oral daily  heparin   Injectable 5000 Unit(s) SubCutaneous every 12 hours  metoprolol succinate ER 25 milliGRAM(s) Oral daily  pantoprazole    Tablet 40 milliGRAM(s) Oral before breakfast  sodium chloride 0.45%. 1000 milliLiter(s) (45 mL/Hr) IV Continuous <Continuous>  tamsulosin 0.4 milliGRAM(s) Oral at bedtime    MEDICATIONS  (PRN):  acetaminophen     Tablet .. 650 milliGRAM(s) Oral every 6 hours PRN Temp greater or equal to 38C (100.4F), Mild Pain (1 - 3)  aluminum hydroxide/magnesium hydroxide/simethicone Suspension 30 milliLiter(s) Oral every 4 hours PRN Dyspepsia  melatonin 3 milliGRAM(s) Oral at bedtime PRN Insomnia  ondansetron Injectable 4 milliGRAM(s) IV Push every 8 hours PRN Nausea and/or Vomiting              Vital Signs Last 24 Hrs  T(C): 36.4 (05 Aug 2024 04:02), Max: 36.6 (04 Aug 2024 11:16)  T(F): 97.6 (05 Aug 2024 04:02), Max: 97.8 (04 Aug 2024 11:16)  HR: 68 (05 Aug 2024 04:02) (62 - 69)  BP: 134/80 (05 Aug 2024 04:02) (106/54 - 142/63)  BP(mean): --  RR: 18 (05 Aug 2024 04:02) (18 - 19)  SpO2: 98% (05 Aug 2024 04:02) (93% - 98%)    Parameters below as of 05 Aug 2024 04:02  Patient On (Oxygen Delivery Method): room air          08-04-24 @ 07:01  -  08-05-24 @ 07:00  --------------------------------------------------------  IN: 0 mL / OUT: 740 mL / NET: -740 mL              LABS:                        9.5    6.83  )-----------( 153      ( 05 Aug 2024 06:50 )             28.8     08-05    142  |  109<H>  |  9   ----------------------------<  96  3.8   |  29  |  0.64    Ca    8.4<L>      05 Aug 2024 06:50        Urinalysis Basic - ( 05 Aug 2024 06:50 )    Color: x / Appearance: x / SG: x / pH: x  Gluc: 96 mg/dL / Ketone: x  / Bili: x / Urobili: x   Blood: x / Protein: x / Nitrite: x   Leuk Esterase: x / RBC: x / WBC x   Sq Epi: x / Non Sq Epi: x / Bacteria: x            WBC:  WBC Count: 6.83 K/uL (08-05 @ 06:50)  WBC Count: 7.35 K/uL (08-04 @ 06:54)  WBC Count: 6.50 K/uL (08-03 @ 06:38)  WBC Count: 11.78 K/uL (08-02 @ 06:45)  WBC Count: 19.54 K/uL (08-01 @ 08:33)      MICROBIOLOGY:  RECENT CULTURES:  08-02 .Blood Blood XXXX XXXX   No growth at 48 Hours    08-02 .Blood Blood XXXX XXXX   No growth at 48 Hours    07-31 .Blood Blood-Peripheral Blood Culture PCR  Proteus mirabilis   Growth in aerobic bottle: Gram Negative Rods   Growth in aerobic bottle: Proteus mirabilis  Direct identification is available within approximately 3-5  hours either by Blood Panel Multiplexed PCR or Direct  MALDI-TOF. Details: [email or web address]    07-31 .Blood Blood-Peripheral XXXX XXXX   No growth at 4 days                    Sodium:  Sodium: 142 mmol/L (08-05 @ 06:50)  Sodium: 142 mmol/L (08-04 @ 06:54)  Sodium: 142 mmol/L (08-03 @ 06:38)  Sodium: 142 mmol/L (08-02 @ 06:45)  Sodium: 144 mmol/L (08-01 @ 08:33)      0.64 mg/dL 08-05 @ 06:50  0.55 mg/dL 08-04 @ 06:54  0.57 mg/dL 08-03 @ 06:38  0.57 mg/dL 08-02 @ 06:45  0.79 mg/dL 08-01 @ 08:33      Hemoglobin:  Hemoglobin: 9.5 g/dL (08-05 @ 06:50)  Hemoglobin: 9.4 g/dL (08-04 @ 06:54)  Hemoglobin: 9.0 g/dL (08-03 @ 06:38)  Hemoglobin: 9.6 g/dL (08-02 @ 06:45)  Hemoglobin: 11.2 g/dL (08-01 @ 08:33)      Platelets: Platelet Count - Automated: 153 K/uL (08-05 @ 06:50)  Platelet Count - Automated: 161 K/uL (08-04 @ 06:54)  Platelet Count - Automated: 149 K/uL (08-03 @ 06:38)  Platelet Count - Automated: 166 K/uL (08-02 @ 06:45)  Platelet Count - Automated: 206 K/uL (08-01 @ 08:33)          Urinalysis Basic - ( 05 Aug 2024 06:50 )    Color: x / Appearance: x / SG: x / pH: x  Gluc: 96 mg/dL / Ketone: x  / Bili: x / Urobili: x   Blood: x / Protein: x / Nitrite: x   Leuk Esterase: x / RBC: x / WBC x   Sq Epi: x / Non Sq Epi: x / Bacteria: x        RADIOLOGY & ADDITIONAL STUDIES:      MICROBIOLOGY:  RECENT CULTURES:  08-02 .Blood Blood XXXX XXXX   No growth at 48 Hours    08-02 .Blood Blood XXXX XXXX   No growth at 48 Hours    07-31 .Blood Blood-Peripheral Blood Culture PCR  Proteus mirabilis   Growth in aerobic bottle: Gram Negative Rods   Growth in aerobic bottle: Proteus mirabilis  Direct identification is available within approximately 3-5  hours either by Blood Panel Multiplexed PCR or Direct  MALDI-TOF. Details: [email or web address]    07-31 .Blood Blood-Peripheral XXXX XXXX   No growth at 4 days

## 2024-08-05 NOTE — DISCHARGE NOTE PROVIDER - CARE PROVIDERS DIRECT ADDRESSES
[email or web address]

## 2024-08-05 NOTE — PHYSICAL THERAPY INITIAL EVALUATION ADULT - GENERAL OBSERVATIONS, REHAB EVAL
Patient was received and left supine with head of bed elevated, call bell in reach & bed alarm activated.

## 2024-08-31 NOTE — CARE COORDINATION ASSESSMENT. - OTHER PERTINENT DISCHARGE PLANNING INFORMATION:
PACU
pta, pt was living w wife and was using waker to ambulate but according to wife has been walking less and less over past few weeks. Wife is supportive and is still working, pt has been retired appox 15 years. Pt seen by PT who is recc [facility]. Pt and spouse in agreement, first choice is [facility], second choice [facility]. Sw requested additional facility choices. Wife to review packet w dtr. Sw to request [facility], plan now remains for [facility].

## 2024-11-21 PROBLEM — N40.0 BENIGN PROSTATIC HYPERPLASIA WITHOUT LOWER URINARY TRACT SYMPTOMS: Chronic | Status: ACTIVE | Noted: 2024-01-01

## 2024-11-21 PROBLEM — Z96.0 PRESENCE OF UROGENITAL IMPLANTS: Chronic | Status: ACTIVE | Noted: 2024-01-01

## 2024-11-21 PROBLEM — K44.9 DIAPHRAGMATIC HERNIA WITHOUT OBSTRUCTION OR GANGRENE: Chronic | Status: ACTIVE | Noted: 2024-01-01

## 2024-11-21 PROBLEM — M19.90 UNSPECIFIED OSTEOARTHRITIS, UNSPECIFIED SITE: Chronic | Status: ACTIVE | Noted: 2024-01-01

## 2024-11-21 PROBLEM — Z87.39 PERSONAL HISTORY OF OTHER DISEASES OF THE MUSCULOSKELETAL SYSTEM AND CONNECTIVE TISSUE: Chronic | Status: ACTIVE | Noted: 2024-01-01

## 2024-11-21 PROBLEM — Z87.09 PERSONAL HISTORY OF OTHER DISEASES OF THE RESPIRATORY SYSTEM: Chronic | Status: ACTIVE | Noted: 2024-01-01

## 2024-11-21 PROBLEM — R33.9 RETENTION OF URINE, UNSPECIFIED: Chronic | Status: ACTIVE | Noted: 2024-01-01

## 2024-11-21 PROBLEM — D63.8 ANEMIA IN OTHER CHRONIC DISEASES CLASSIFIED ELSEWHERE: Chronic | Status: ACTIVE | Noted: 2024-01-01

## 2024-11-21 LAB
GRAM STN FLD: ABNORMAL
METHOD TYPE: SIGNIFICANT CHANGE UP
PROTEUS SP DNA BLD POS QL NAA+NON-PROBE: SIGNIFICANT CHANGE UP
SPECIMEN SOURCE: SIGNIFICANT CHANGE UP
SPECIMEN SOURCE: SIGNIFICANT CHANGE UP

## 2024-11-23 LAB
-  AMPICILLIN/SULBACTAM: SIGNIFICANT CHANGE UP
-  AMPICILLIN: SIGNIFICANT CHANGE UP
-  AZTREONAM: SIGNIFICANT CHANGE UP
-  CEFAZOLIN: SIGNIFICANT CHANGE UP
-  CEFEPIME: SIGNIFICANT CHANGE UP
-  CEFOXITIN: SIGNIFICANT CHANGE UP
-  CEFTRIAXONE: SIGNIFICANT CHANGE UP
-  CIPROFLOXACIN: SIGNIFICANT CHANGE UP
-  ERTAPENEM: SIGNIFICANT CHANGE UP
-  GENTAMICIN: SIGNIFICANT CHANGE UP
-  LEVOFLOXACIN: SIGNIFICANT CHANGE UP
-  MEROPENEM: SIGNIFICANT CHANGE UP
-  PIPERACILLIN/TAZOBACTAM: SIGNIFICANT CHANGE UP
-  TOBRAMYCIN: SIGNIFICANT CHANGE UP
-  TRIMETHOPRIM/SULFAMETHOXAZOLE: SIGNIFICANT CHANGE UP
CULTURE RESULTS: ABNORMAL
CULTURE RESULTS: ABNORMAL
METHOD TYPE: SIGNIFICANT CHANGE UP
ORGANISM # SPEC MICROSCOPIC CNT: ABNORMAL
ORGANISM # SPEC MICROSCOPIC CNT: ABNORMAL
ORGANISM # SPEC MICROSCOPIC CNT: SIGNIFICANT CHANGE UP
SPECIMEN SOURCE: SIGNIFICANT CHANGE UP
SPECIMEN SOURCE: SIGNIFICANT CHANGE UP

## 2024-12-26 NOTE — ED PROVIDER NOTE - NS ED ATTENDING STATEMENT MOD
This was a shared visit with the RANULFO. I reviewed and verified the documentation and independently performed the documented: There are no Wet Read(s) to document.

## 2025-01-22 NOTE — ED ADULT NURSE NOTE - NSFALLRISKFACTORS_ED_ALL_ED
Dr Rhodes   [phone] Age: 85 years old or older/Coagulation: Bleeding disorder either through use of anticoagulants or underlying clinical condition(s)

## 2025-03-21 NOTE — PRE-OP CHECKLIST - WAS PATIENT ON BETA BLOCKER?
Requested Prescriptions     Pending Prescriptions Disp Refills    LUBIPROSTONE 24 MCG Oral Cap [Pharmacy Med Name: LUBIPROSTONE 24 MCG CAPSULE] 60 capsule 0     Sig: TAKE 1 CAPSULE BY MOUTH 2 TIMES DAILY WITH MEALS.       LOV   1/16/2025  LR  2/07/2025    em   No

## (undated) DEVICE — DRILL BIT STRYKER ORTHO 4.2 X 340MM

## (undated) DEVICE — SUT POLYSORB 2-0 30" GS-11 UNDYED

## (undated) DEVICE — DRSG COBAN 4"

## (undated) DEVICE — SUT POLYSORB 0 30" GS-21 UNDYED

## (undated) DEVICE — PREP CHLORAPREP ORANGE 2PCT 26ML

## (undated) DEVICE — SUT POLYSORB 0 30" GS-12 UNDYED

## (undated) DEVICE — BLANKET WARMER UPPER ADULT

## (undated) DEVICE — DRAPE C ARM UNIVERSAL

## (undated) DEVICE — FOLEY TRAY 16FR SURESTEP LTX BG

## (undated) DEVICE — POSITIONER FOAM HEAD CRADLE

## (undated) DEVICE — DRSG ACE BANDAGE 6"

## (undated) DEVICE — SOL IRR POUR H2O 1500ML

## (undated) DEVICE — DRAPE ISOLATION 125X83"

## (undated) DEVICE — CANISTER SUCTION LID GUARD 3000CC

## (undated) DEVICE — SOL IRR POUR NS 0.9% 500ML

## (undated) DEVICE — WRAP COMPRESSION CALF LG

## (undated) DEVICE — SUT POLYSORB 2-0 30" GS-21 UNDYED

## (undated) DEVICE — SOLIDIFIER CANN EXPRESS 3K

## (undated) DEVICE — SUT POLYSORB 1 36" GS-21 UNDYED

## (undated) DEVICE — SYM-STRYKER SYSTEM 7: Type: DURABLE MEDICAL EQUIPMENT

## (undated) DEVICE — STAPLER SKIN VISI-STAT 35 WIDE

## (undated) DEVICE — POSITIONER STRAP ARMBOARD VELCRO TS-30 12